# Patient Record
Sex: MALE | Race: OTHER | HISPANIC OR LATINO | URBAN - METROPOLITAN AREA
[De-identification: names, ages, dates, MRNs, and addresses within clinical notes are randomized per-mention and may not be internally consistent; named-entity substitution may affect disease eponyms.]

---

## 2017-01-10 ENCOUNTER — EMERGENCY (EMERGENCY)
Facility: HOSPITAL | Age: 32
LOS: 0 days | Discharge: HOME | End: 2017-01-10

## 2017-01-13 ENCOUNTER — RECORD ABSTRACTING (OUTPATIENT)
Age: 32
End: 2017-01-13

## 2017-05-18 ENCOUNTER — RECORD ABSTRACTING (OUTPATIENT)
Age: 32
End: 2017-05-18

## 2017-06-26 ENCOUNTER — APPOINTMENT (OUTPATIENT)
Dept: OTOLARYNGOLOGY | Facility: CLINIC | Age: 32
End: 2017-06-26

## 2017-06-27 DIAGNOSIS — K21.9 GASTRO-ESOPHAGEAL REFLUX DISEASE WITHOUT ESOPHAGITIS: ICD-10-CM

## 2017-06-27 DIAGNOSIS — J02.9 ACUTE PHARYNGITIS, UNSPECIFIED: ICD-10-CM

## 2017-06-27 DIAGNOSIS — R07.0 PAIN IN THROAT: ICD-10-CM

## 2017-06-29 ENCOUNTER — APPOINTMENT (OUTPATIENT)
Dept: OTOLARYNGOLOGY | Facility: CLINIC | Age: 32
End: 2017-06-29

## 2017-06-29 VITALS — BODY MASS INDEX: 21.69 KG/M2 | WEIGHT: 135 LBS | HEIGHT: 66 IN

## 2017-07-18 ENCOUNTER — APPOINTMENT (OUTPATIENT)
Dept: INTERNAL MEDICINE | Facility: CLINIC | Age: 32
End: 2017-07-18

## 2017-07-20 ENCOUNTER — APPOINTMENT (OUTPATIENT)
Dept: OTOLARYNGOLOGY | Facility: CLINIC | Age: 32
End: 2017-07-20

## 2017-07-20 VITALS — HEIGHT: 66 IN | WEIGHT: 135 LBS | BODY MASS INDEX: 21.69 KG/M2

## 2017-07-29 ENCOUNTER — OUTPATIENT (OUTPATIENT)
Dept: OUTPATIENT SERVICES | Facility: HOSPITAL | Age: 32
LOS: 1 days | Discharge: HOME | End: 2017-07-29

## 2017-07-29 DIAGNOSIS — R22.1 LOCALIZED SWELLING, MASS AND LUMP, NECK: ICD-10-CM

## 2017-08-14 ENCOUNTER — APPOINTMENT (OUTPATIENT)
Dept: OTOLARYNGOLOGY | Facility: CLINIC | Age: 32
End: 2017-08-14
Payer: COMMERCIAL

## 2017-08-14 VITALS — BODY MASS INDEX: 21.69 KG/M2 | HEIGHT: 66 IN | WEIGHT: 135 LBS

## 2017-08-14 PROCEDURE — 99213 OFFICE O/P EST LOW 20 MIN: CPT | Mod: 25

## 2017-08-14 PROCEDURE — 31575 DIAGNOSTIC LARYNGOSCOPY: CPT

## 2018-01-06 ENCOUNTER — INPATIENT (INPATIENT)
Facility: HOSPITAL | Age: 33
LOS: 0 days | Discharge: HOME | End: 2018-01-07
Attending: EMERGENCY MEDICINE

## 2018-01-10 DIAGNOSIS — R07.9 CHEST PAIN, UNSPECIFIED: ICD-10-CM

## 2018-01-10 DIAGNOSIS — M54.2 CERVICALGIA: ICD-10-CM

## 2018-01-10 DIAGNOSIS — R07.89 OTHER CHEST PAIN: ICD-10-CM

## 2018-01-22 ENCOUNTER — APPOINTMENT (OUTPATIENT)
Dept: OTOLARYNGOLOGY | Facility: CLINIC | Age: 33
End: 2018-01-22

## 2019-03-02 ENCOUNTER — EMERGENCY (EMERGENCY)
Facility: HOSPITAL | Age: 34
LOS: 0 days | Discharge: HOME | End: 2019-03-03
Attending: EMERGENCY MEDICINE | Admitting: EMERGENCY MEDICINE

## 2019-03-02 VITALS
DIASTOLIC BLOOD PRESSURE: 70 MMHG | HEART RATE: 75 BPM | RESPIRATION RATE: 18 BRPM | TEMPERATURE: 98 F | OXYGEN SATURATION: 98 % | SYSTOLIC BLOOD PRESSURE: 126 MMHG

## 2019-03-02 DIAGNOSIS — R19.02 LEFT UPPER QUADRANT ABDOMINAL SWELLING, MASS AND LUMP: ICD-10-CM

## 2019-03-02 DIAGNOSIS — R10.9 UNSPECIFIED ABDOMINAL PAIN: ICD-10-CM

## 2019-03-02 DIAGNOSIS — R10.12 LEFT UPPER QUADRANT PAIN: ICD-10-CM

## 2019-03-02 LAB
ALBUMIN SERPL ELPH-MCNC: 4.3 G/DL — SIGNIFICANT CHANGE UP (ref 3.5–5.2)
ALP SERPL-CCNC: 64 U/L — SIGNIFICANT CHANGE UP (ref 30–115)
ALT FLD-CCNC: 43 U/L — HIGH (ref 0–41)
ANION GAP SERPL CALC-SCNC: 9 MMOL/L — SIGNIFICANT CHANGE UP (ref 7–14)
AST SERPL-CCNC: 47 U/L — HIGH (ref 0–41)
BASOPHILS # BLD AUTO: 0.04 K/UL — SIGNIFICANT CHANGE UP (ref 0–0.2)
BASOPHILS NFR BLD AUTO: 0.5 % — SIGNIFICANT CHANGE UP (ref 0–1)
BILIRUB SERPL-MCNC: 0.6 MG/DL — SIGNIFICANT CHANGE UP (ref 0.2–1.2)
BUN SERPL-MCNC: 16 MG/DL — SIGNIFICANT CHANGE UP (ref 10–20)
CALCIUM SERPL-MCNC: 9 MG/DL — SIGNIFICANT CHANGE UP (ref 8.5–10.1)
CHLORIDE SERPL-SCNC: 105 MMOL/L — SIGNIFICANT CHANGE UP (ref 98–110)
CO2 SERPL-SCNC: 25 MMOL/L — SIGNIFICANT CHANGE UP (ref 17–32)
CREAT SERPL-MCNC: 0.8 MG/DL — SIGNIFICANT CHANGE UP (ref 0.7–1.5)
EOSINOPHIL # BLD AUTO: 0.08 K/UL — SIGNIFICANT CHANGE UP (ref 0–0.7)
EOSINOPHIL NFR BLD AUTO: 1 % — SIGNIFICANT CHANGE UP (ref 0–8)
GLUCOSE SERPL-MCNC: 90 MG/DL — SIGNIFICANT CHANGE UP (ref 70–99)
HCT VFR BLD CALC: 42.9 % — SIGNIFICANT CHANGE UP (ref 42–52)
HGB BLD-MCNC: 14.6 G/DL — SIGNIFICANT CHANGE UP (ref 14–18)
IMM GRANULOCYTES NFR BLD AUTO: 0.4 % — HIGH (ref 0.1–0.3)
LACTATE SERPL-SCNC: 0.8 MMOL/L — SIGNIFICANT CHANGE UP (ref 0.5–2.2)
LIDOCAIN IGE QN: 26 U/L — SIGNIFICANT CHANGE UP (ref 7–60)
LYMPHOCYTES # BLD AUTO: 1.9 K/UL — SIGNIFICANT CHANGE UP (ref 1.2–3.4)
LYMPHOCYTES # BLD AUTO: 23.1 % — SIGNIFICANT CHANGE UP (ref 20.5–51.1)
MCHC RBC-ENTMCNC: 28.6 PG — SIGNIFICANT CHANGE UP (ref 27–31)
MCHC RBC-ENTMCNC: 34 G/DL — SIGNIFICANT CHANGE UP (ref 32–37)
MCV RBC AUTO: 84.1 FL — SIGNIFICANT CHANGE UP (ref 80–94)
MONOCYTES # BLD AUTO: 0.48 K/UL — SIGNIFICANT CHANGE UP (ref 0.1–0.6)
MONOCYTES NFR BLD AUTO: 5.8 % — SIGNIFICANT CHANGE UP (ref 1.7–9.3)
NEUTROPHILS # BLD AUTO: 5.69 K/UL — SIGNIFICANT CHANGE UP (ref 1.4–6.5)
NEUTROPHILS NFR BLD AUTO: 69.2 % — SIGNIFICANT CHANGE UP (ref 42.2–75.2)
NRBC # BLD: 0 /100 WBCS — SIGNIFICANT CHANGE UP (ref 0–0)
PLATELET # BLD AUTO: 222 K/UL — SIGNIFICANT CHANGE UP (ref 130–400)
POTASSIUM SERPL-MCNC: 4.2 MMOL/L — SIGNIFICANT CHANGE UP (ref 3.5–5)
POTASSIUM SERPL-SCNC: 4.2 MMOL/L — SIGNIFICANT CHANGE UP (ref 3.5–5)
PROT SERPL-MCNC: 6.8 G/DL — SIGNIFICANT CHANGE UP (ref 6–8)
RBC # BLD: 5.1 M/UL — SIGNIFICANT CHANGE UP (ref 4.7–6.1)
RBC # FLD: 13.2 % — SIGNIFICANT CHANGE UP (ref 11.5–14.5)
SODIUM SERPL-SCNC: 139 MMOL/L — SIGNIFICANT CHANGE UP (ref 135–146)
WBC # BLD: 8.22 K/UL — SIGNIFICANT CHANGE UP (ref 4.8–10.8)
WBC # FLD AUTO: 8.22 K/UL — SIGNIFICANT CHANGE UP (ref 4.8–10.8)

## 2019-03-03 VITALS
HEART RATE: 69 BPM | SYSTOLIC BLOOD PRESSURE: 111 MMHG | RESPIRATION RATE: 18 BRPM | OXYGEN SATURATION: 97 % | DIASTOLIC BLOOD PRESSURE: 61 MMHG

## 2019-03-03 NOTE — ED PROVIDER NOTE - NSFOLLOWUPCLINICS_GEN_ALL_ED_FT
[facility]  Medicine  [address], [place]   Phone: [phone]  Fax:   Follow Up Time: 1-3 Days

## 2019-03-03 NOTE — ED PROVIDER NOTE - NS ED ROS FT
Review of Systems:  	•	CONSTITUTIONAL - no fever, no diaphoresis, no chills  	•	SKIN - no rash  	•	HEMATOLOGIC - no bleeding, no bruising  	•	EYES - no eye pain, no blurry vision  	•	ENT - no congestion  	•	RESPIRATORY - no shortness of breath, no cough  	•	CARDIAC - no chest pain, no palpitations  	•	GI - +abd pain, no nausea, no vomiting, no diarrhea, no constipation  	•	GENITO-URINARY - no dysuria; no hematuria, no increased urinary frequency  	•	MUSCULOSKELETAL - no joint paint, no swelling, no redness  	•	NEUROLOGIC - no weakness, no headache, no paresthesias, no LOC  	•	PSYCH - no anxiety, no depression  	All other ROS are negative except as documented in HPI.

## 2019-03-03 NOTE — ED PROVIDER NOTE - CLINICAL SUMMARY MEDICAL DECISION MAKING FREE TEXT BOX
patient remained stable in ED, labs/CT results noted, patient improved well, discussed with patient in detail about his clinical condition, and the need for close outpatient follow up, patient verbalized understanding and agreed.

## 2019-03-03 NOTE — ED ADULT NURSE NOTE - NSIMPLEMENTINTERV_GEN_ALL_ED
Implemented All Universal Safety Interventions:  [place] to call system. Call bell, personal items and telephone within reach. Instruct patient to call for assistance. Room bathroom lighting operational. Non-slip footwear when patient is off stretcher. Physically safe environment: no spills, clutter or unnecessary equipment. Stretcher in lowest position, wheels locked, appropriate side rails in place.

## 2019-03-03 NOTE — ED PROVIDER NOTE - PHYSICAL EXAMINATION
VITAL SIGNS: I have reviewed nursing notes and confirm.  CONSTITUTIONAL: Well-developed; well-nourished; in no acute distress.  SKIN: Skin exam is warm and dry, no acute rash.  HEAD: Normocephalic; atraumatic.  EYES: PERRL, EOM intact; conjunctiva and sclera clear.  ENT: No nasal discharge; airway clear.   NECK: Supple; non tender.  CARD: S1, S2 normal; no murmurs, gallops, or rubs. Regular rate and rhythm.  RESP: Clear to auscultation bilaterally. No wheezes, rales or rhonchi.  ABD: Normal bowel sounds; soft; non-distended; +LUQ tenderness. No rebound tenderness or guarding.   EXT: Normal ROM. No edema.  LYMPH: No acute cervical adenopathy.  NEURO: Alert, oriented. Grossly unremarkable. No focal deficits.  PSYCH: Cooperative, appropriate.

## 2019-03-03 NOTE — ED PROVIDER NOTE - ATTENDING CONTRIBUTION TO CARE
patient is c/o abdominal pain, LUQ area, intermittent episodes, worse after eating certain food, denies cp/sob/syncope/trauma. pain is described as gas/crampy, denies particular relieving factors, no rash, no  symptoms.   vitals noted  lungs: CTA, no wheezing  abd: +BS, +LUQ tenderness, ND, soft  A/P: Abdominal pain  labs, CT, symptomatic treatment  reevaluation

## 2019-03-03 NOTE — ED PROVIDER NOTE - OBJECTIVE STATEMENT
34 yo M with no pmhx presenting with constant, achy left upper quadrant pain and swelling x 3 days. States pain initially was intermittent 2 weeks ago but has now become constant. Symptoms are moderate. No alleviating or aggravating factors. No cp, sob, fever, chills, nausea, vomiting, diarrhea, back pain, urinary symptoms, headache, dizziness, paresthesias, or weakness.

## 2019-04-19 ENCOUNTER — APPOINTMENT (OUTPATIENT)
Dept: GASTROENTEROLOGY | Facility: CLINIC | Age: 34
End: 2019-04-19

## 2019-04-19 ENCOUNTER — OUTPATIENT (OUTPATIENT)
Dept: OUTPATIENT SERVICES | Facility: HOSPITAL | Age: 34
LOS: 1 days | Discharge: HOME | End: 2019-04-19

## 2019-04-19 VITALS — HEART RATE: 67 BPM | DIASTOLIC BLOOD PRESSURE: 79 MMHG | HEIGHT: 66 IN | SYSTOLIC BLOOD PRESSURE: 114 MMHG

## 2019-04-19 VITALS — WEIGHT: 158 LBS | HEIGHT: 66 IN | BODY MASS INDEX: 25.39 KG/M2

## 2019-04-19 NOTE — PHYSICAL EXAM
[General Appearance - Well Nourished] : well nourished [General Appearance - In No Acute Distress] : in no acute distress [General Appearance - Alert] : alert [Sclera] : the sclera and conjunctiva were normal [Neck Appearance] : the appearance of the neck was normal [Outer Ear] : the ears and nose were normal in appearance [Extraocular Movements] : extraocular movements were intact [] : no respiratory distress [Exaggerated Use Of Accessory Muscles For Inspiration] : no accessory muscle use [Neck Cervical Mass (___cm)] : no neck mass was observed [Bowel Sounds] : normal bowel sounds [Abdomen Tenderness] : non-tender [Abdomen Soft] : soft [Abnormal Walk] : normal gait

## 2019-04-19 NOTE — END OF VISIT
[FreeTextEntry3] : Pt seen and examined with Dr Von Licea.  Pt has mild lft abnormalities, will send CLD work up.  Pt has LUQ pain responsive to vinegar and he has pyrosis.  We will do egd to rule out acid peptic disease.

## 2019-04-19 NOTE — REASON FOR VISIT
[Post Hospitalization] : a post hospitalization visit [FreeTextEntry1] : Abdominal pain that resolved

## 2019-04-19 NOTE — ASSESSMENT
[FreeTextEntry1] : A 33 y old man previously heathy presented for abdominal pain after hospitalization. Patient was recently hospitalized in march for LUQ pain, intermittent then became constant, crampy like and then resolved. He also complaining of new left sided skin bulging. Also pt reports reflux been going for a while after easting red sauce \par \par No Diarrhea, no vomiting, no blood in the stools, no dysphagia, no other sx\par \par # Reflux/ Heartburn, no alarm sx/ LUQ distention \par CT scan negative except for hepatic steatosis  \par Antireflux diet \par Will schedule EGD\par ASA 1 \par No AC \par \par # Hepatic steatosis/ Mild hepatocellular transaminitis most likely NAFLD \par CLD workup \par Diet and exercise \par Avoid alcohol \par

## 2019-04-19 NOTE — HISTORY OF PRESENT ILLNESS
[de-identified] : A 33 y old man previously heathy presented for abdominal pain after hospitalization. Patient was recently hospitalized in march for LUQ pain, intermittent then became constant, crampy like and then resolved. He also complaining of new left sided skin bulging. Also pt reports reflux been going for a while after easting red sauce \par \par No Diarrhea, no vomiting, no blood in the stools, no dysphagia, no other sx [FreeTextEntry1] : \par

## 2019-04-29 DIAGNOSIS — R10.12 LEFT UPPER QUADRANT PAIN: ICD-10-CM

## 2019-05-03 LAB
ANA SER IF-ACNC: NEGATIVE
CERULOPLASMIN SERPL-MCNC: 19 MG/DL
FERRITIN SERPL-MCNC: 57 NG/ML
HAV IGM SER QL: NONREACTIVE
HBV SURFACE AB SER QL: NONREACTIVE
HBV SURFACE AG SER QL: NONREACTIVE
HCV AB SER QL: NONREACTIVE
HCV S/CO RATIO: 0.06 S/CO
HEPATITIS A IGG ANTIBODY: REACTIVE
IRON SATN MFR SERPL: 41 %
IRON SERPL-MCNC: 130 UG/DL
MITOCHONDRIA AB SER IF-ACNC: NORMAL
SMOOTH MUSCLE AB SER QL IF: NORMAL
TIBC SERPL-MCNC: 317 UG/DL
UIBC SERPL-MCNC: 187 UG/DL

## 2019-05-13 ENCOUNTER — OUTPATIENT (OUTPATIENT)
Dept: OUTPATIENT SERVICES | Facility: HOSPITAL | Age: 34
LOS: 1 days | Discharge: HOME | End: 2019-05-13
Payer: SUBSIDIZED

## 2019-05-13 ENCOUNTER — RESULT REVIEW (OUTPATIENT)
Age: 34
End: 2019-05-13

## 2019-05-13 VITALS
DIASTOLIC BLOOD PRESSURE: 72 MMHG | HEART RATE: 74 BPM | RESPIRATION RATE: 18 BRPM | WEIGHT: 158.07 LBS | SYSTOLIC BLOOD PRESSURE: 113 MMHG | HEIGHT: 66 IN | TEMPERATURE: 98 F

## 2019-05-13 VITALS
SYSTOLIC BLOOD PRESSURE: 112 MMHG | DIASTOLIC BLOOD PRESSURE: 69 MMHG | HEART RATE: 70 BPM | OXYGEN SATURATION: 98 % | RESPIRATION RATE: 18 BRPM

## 2019-05-13 PROCEDURE — 88305 TISSUE EXAM BY PATHOLOGIST: CPT | Mod: 26

## 2019-05-13 PROCEDURE — 88312 SPECIAL STAINS GROUP 1: CPT | Mod: 26

## 2019-05-13 NOTE — H&P PST ADULT - ASSESSMENT
34 yo M with PMH mentioned here for EGD for GERD and Dyspepsia .   Risks and benefits discussed with the patient.   Will proceed with the scheduled case.

## 2019-05-13 NOTE — ASU DISCHARGE PLAN (ADULT/PEDIATRIC) - CALL YOUR DOCTOR IF YOU HAVE ANY OF THE FOLLOWING:
Fever greater than (need to indicate Fahrenheit or Celsius)/Nausea and vomiting that does not stop/Bleeding that does not stop/Pain not relieved by Medications/Inability to tolerate liquids or foods

## 2019-05-15 LAB — SURGICAL PATHOLOGY STUDY: SIGNIFICANT CHANGE UP

## 2019-05-21 DIAGNOSIS — K29.50 UNSPECIFIED CHRONIC GASTRITIS WITHOUT BLEEDING: ICD-10-CM

## 2019-05-21 DIAGNOSIS — B96.81 HELICOBACTER PYLORI [H. PYLORI] AS THE CAUSE OF DISEASES CLASSIFIED ELSEWHERE: ICD-10-CM

## 2019-05-21 DIAGNOSIS — R10.13 EPIGASTRIC PAIN: ICD-10-CM

## 2019-06-04 ENCOUNTER — OUTPATIENT (OUTPATIENT)
Dept: OUTPATIENT SERVICES | Facility: HOSPITAL | Age: 34
LOS: 1 days | Discharge: HOME | End: 2019-06-04

## 2019-06-04 ENCOUNTER — APPOINTMENT (OUTPATIENT)
Dept: INTERNAL MEDICINE | Facility: CLINIC | Age: 34
End: 2019-06-04

## 2019-06-04 VITALS
TEMPERATURE: 96.56 F | HEIGHT: 66 IN | HEART RATE: 77 BPM | DIASTOLIC BLOOD PRESSURE: 81 MMHG | BODY MASS INDEX: 25.88 KG/M2 | SYSTOLIC BLOOD PRESSURE: 122 MMHG | WEIGHT: 161 LBS

## 2019-06-04 DIAGNOSIS — L80 VITILIGO: ICD-10-CM

## 2019-06-04 DIAGNOSIS — B37.89 OTHER SITES OF CANDIDIASIS: ICD-10-CM

## 2019-06-04 DIAGNOSIS — Z87.898 PERSONAL HISTORY OF OTHER SPECIFIED CONDITIONS: ICD-10-CM

## 2019-06-04 DIAGNOSIS — K76.0 FATTY (CHANGE OF) LIVER, NOT ELSEWHERE CLASSIFIED: ICD-10-CM

## 2019-06-04 DIAGNOSIS — J37.0 CHRONIC LARYNGITIS: ICD-10-CM

## 2019-06-04 DIAGNOSIS — A04.8 OTHER SPECIFIED BACTERIAL INTESTINAL INFECTIONS: ICD-10-CM

## 2019-06-04 DIAGNOSIS — D22.9 MELANOCYTIC NEVI, UNSPECIFIED: ICD-10-CM

## 2019-06-04 RX ORDER — AMOXICILLIN AND CLAVULANATE POTASSIUM 875; 125 MG/1; MG/1
875-125 TABLET, COATED ORAL
Qty: 20 | Refills: 0 | Status: DISCONTINUED | COMMUNITY
Start: 2017-06-29 | End: 2019-06-04

## 2019-06-04 RX ORDER — OMEPRAZOLE 40 MG/1
40 CAPSULE, DELAYED RELEASE ORAL
Refills: 0 | Status: DISCONTINUED | COMMUNITY
End: 2019-06-04

## 2019-06-04 RX ORDER — OMEPRAZOLE 40 MG/1
40 CAPSULE, DELAYED RELEASE ORAL
Qty: 30 | Refills: 3 | Status: DISCONTINUED | COMMUNITY
Start: 2017-08-14 | End: 2019-06-04

## 2019-06-04 RX ORDER — LORATADINE 10 MG
10 TABLET,DISINTEGRATING ORAL DAILY
Qty: 30 | Refills: 3 | Status: DISCONTINUED | COMMUNITY
Start: 2017-06-29 | End: 2019-06-04

## 2019-06-04 NOTE — REVIEW OF SYSTEMS
[Redness] : redness [Sore Throat] : sore throat [Abdominal Pain] : abdominal pain [Negative] : Constitutional [Pain] : no pain [Chest Pain] : no chest pain [Hearing Loss] : no hearing loss [Shortness Of Breath] : no shortness of breath [Wheezing] : no wheezing [Cough] : no cough [Nausea] : no nausea [Vomiting] : no vomiting [Constipation] : no constipation [Melena] : no melena [Dysuria] : no dysuria [Headache] : no headache [Back Pain] : no back pain [Suicidal] : not suicidal [Insomnia] : no insomnia [Anxiety] : no anxiety [Depression] : no depression

## 2019-06-04 NOTE — PHYSICAL EXAM
[EOMI] : extraocular movements intact [No Acute Distress] : no acute distress [Normal Outer Ear/Nose] : the outer ears and nose were normal in appearance [Normal Oropharynx] : the oropharynx was normal [No JVD] : no jugular venous distention [Supple] : supple [Clear to Auscultation] : lungs were clear to auscultation bilaterally [Regular Rhythm] : with a regular rhythm [Normal Rate] : normal rate  [No Accessory Muscle Use] : no accessory muscle use [No Edema] : there was no peripheral edema [Normal S1, S2] : normal S1 and S2 [Non Tender] : non-tender [No HSM] : no HSM [Soft] : abdomen soft [No CVA Tenderness] : no CVA  tenderness [No Spinal Tenderness] : no spinal tenderness [Normal Gait] : normal gait [de-identified] : left hand hypopigmented irregular macular lesions, acanthosis nigricans on the right hip, multilpe nevi on the trunk and back [de-identified] : red eyes

## 2019-06-04 NOTE — HISTORY OF PRESENT ILLNESS
[de-identified] : a 34 yo man with GERD and hx of chronic laryngitis off treatment, is here to establish care\par \par he complains of sore throat and foreign body sensation in his throat. he followed with ENT, s/p scope which showed inflammation. referred to GI, s/p EGD 5/13/19: patchy granularity + erythema of the mucosa was noted in the stomach. H pylori positive\par \par he also complains of eruption of multiple nevi on his trunk and back that all appeared over the last 2 years, he also has left hand depigmented macular lesions, sometimes itching.

## 2019-06-04 NOTE — ASSESSMENT
[FreeTextEntry1] : a 34 yo man with GERD and hx of chronic laryngitis off treatment, is here to establish care\par \par *H.pylori gastritis\par -triple therapy\par -stool ag after 2 weeks off PPI\par -GI follow up\par \par *hepatic steatosis\par CLD workup negative\par LFTs mildly elevated\par repeat LFTs\par check hba1c and lipid profile\par dietary referral\par diet and exercise\par \par *Vitiligo and multiple Nevi eruption\par derm referral\par \par *HCM\par refused dtap\par hep B vaccine given 1st dose 6/4/2019\par \par

## 2019-06-06 LAB
25(OH)D3 SERPL-MCNC: 15 NG/ML
ALBUMIN SERPL ELPH-MCNC: 4.8 G/DL
ALP BLD-CCNC: 75 U/L
ALT SERPL-CCNC: 37 U/L
ANION GAP SERPL CALC-SCNC: 11 MMOL/L
AST SERPL-CCNC: 23 U/L
BILIRUB DIRECT SERPL-MCNC: <0.2 MG/DL
BILIRUB INDIRECT SERPL-MCNC: >0.5 MG/DL
BILIRUB SERPL-MCNC: 0.7 MG/DL
BUN SERPL-MCNC: 14 MG/DL
CALCIUM SERPL-MCNC: 9.3 MG/DL
CHLORIDE SERPL-SCNC: 102 MMOL/L
CHOLEST SERPL-MCNC: 216 MG/DL
CHOLEST/HDLC SERPL: 4.6 RATIO
CO2 SERPL-SCNC: 29 MMOL/L
CREAT SERPL-MCNC: 0.9 MG/DL
ESTIMATED AVERAGE GLUCOSE: 103 MG/DL
GLUCOSE SERPL-MCNC: 96 MG/DL
HBA1C MFR BLD HPLC: 5.2 %
HDLC SERPL-MCNC: 47 MG/DL
LDLC SERPL CALC-MCNC: 172 MG/DL
POTASSIUM SERPL-SCNC: 5 MMOL/L
PROT SERPL-MCNC: 7.4 G/DL
SODIUM SERPL-SCNC: 142 MMOL/L
TRIGL SERPL-MCNC: 108 MG/DL

## 2019-06-07 LAB — TSH SERPL-ACNC: 1.65 UIU/ML

## 2019-06-10 LAB — H PYLORI AG STL QL: DETECTED

## 2019-07-09 ENCOUNTER — APPOINTMENT (OUTPATIENT)
Dept: INTERNAL MEDICINE | Facility: CLINIC | Age: 34
End: 2019-07-09

## 2019-07-09 ENCOUNTER — OUTPATIENT (OUTPATIENT)
Dept: OUTPATIENT SERVICES | Facility: HOSPITAL | Age: 34
LOS: 1 days | Discharge: HOME | End: 2019-07-09

## 2019-07-09 VITALS
TEMPERATURE: 97.2 F | WEIGHT: 153 LBS | DIASTOLIC BLOOD PRESSURE: 78 MMHG | HEIGHT: 66 IN | HEART RATE: 65 BPM | SYSTOLIC BLOOD PRESSURE: 118 MMHG | BODY MASS INDEX: 24.59 KG/M2

## 2019-07-09 DIAGNOSIS — Z23 ENCOUNTER FOR IMMUNIZATION: ICD-10-CM

## 2019-07-09 NOTE — ASSESSMENT
[FreeTextEntry1] : a 33 yo man with GERD and hx of chronic laryngitis off treatment, is here to establish care\par \par *H.pylori gastritis\par -triple therapy finished 2 weeks ago\par -stool ag to be done\par -GI follow up next week\par -c/w PPI post stool test\par \par *hepatic steatosis\par CLD workup negative\par LFTs normal June 2019\par LDL high but ASCVD score 10 year risk is only 5 --> diet and exercise, dietician follow up scheduled in 2 weeks\par diet and exercise\par \par *Vitiligo and multiple Nevi eruption\par derm referral scheduled in oct\par \par *Vitamin D def\par -start replacement\par \par *HCM\par refused dtap\par hep B vaccine given 1st dose 6/4/2019, second dose 7/9/2019.\par

## 2019-07-09 NOTE — HISTORY OF PRESENT ILLNESS
[FreeTextEntry1] : follow up and hep B vaccine [de-identified] : a 33 yo man with GERD and hx of chronic laryngitis off treatment,  H.pylori and gastritis is here for follow up\par he finished triple therapy 2 weeks ago (amoxicillin, clarithromycin and PPI), now off PPI\par he is still complaining of epigastric pain

## 2019-07-09 NOTE — REVIEW OF SYSTEMS
[Abdominal Pain] : abdominal pain [Heartburn] : heartburn [Negative] : Respiratory [Nausea] : no nausea [Constipation] : no constipation [Vomiting] : no vomiting [Dysuria] : no dysuria [Joint Pain] : no joint pain [de-identified] : vitiligo

## 2019-07-12 LAB — H PYLORI AG STL QL: NOT DETECTED

## 2019-07-19 ENCOUNTER — OUTPATIENT (OUTPATIENT)
Dept: OUTPATIENT SERVICES | Facility: HOSPITAL | Age: 34
LOS: 1 days | Discharge: HOME | End: 2019-07-19

## 2019-07-19 ENCOUNTER — APPOINTMENT (OUTPATIENT)
Dept: GASTROENTEROLOGY | Facility: CLINIC | Age: 34
End: 2019-07-19
Payer: COMMERCIAL

## 2019-07-19 PROCEDURE — 99214 OFFICE O/P EST MOD 30 MIN: CPT

## 2019-07-19 NOTE — ASSESSMENT
[FreeTextEntry1] : 35 yo M with H pylori gastritis SP eradication\par 1)H pylori- treated\par 2)Average risk CRC\par \par rec:\par Continue protonix for GERD\par Follow up with General medicine\par Return at 49 year of age for CRC screening colonoscopy

## 2019-07-19 NOTE — HISTORY OF PRESENT ILLNESS
[de-identified] : A 33 y old man previously heathy presented for abdominal pain after hospitalization. Patient was recently hospitalized in march for LUQ pain, intermittent then became constant, crampy like and then resolved. He also complaining of new left sided skin bulging. Also pt reports reflux been going for a while after easting red sauce \par \par No Diarrhea, no vomiting, no blood in the stools, no dysphagia, no other sx. \par \par \par Interval Hx:\par H pylori treated and stool Ag was negative afterwards.\par Still cpmplaining of a painful buldge on the LUQ.\par No other symptoms

## 2019-07-19 NOTE — PHYSICAL EXAM
[General Appearance - Alert] : alert [General Appearance - In No Acute Distress] : in no acute distress [Sclera] : the sclera and conjunctiva were normal [PERRL With Normal Accommodation] : pupils were equal in size, round, and reactive to light [Extraocular Movements] : extraocular movements were intact [Outer Ear] : the ears and nose were normal in appearance [Oropharynx] : the oropharynx was normal [Neck Appearance] : the appearance of the neck was normal [] : no respiratory distress [Abdominal Aorta] : the abdominal aorta was normal [No CVA Tenderness] : no ~M costovertebral angle tenderness [Abnormal Walk] : normal gait

## 2019-07-23 DIAGNOSIS — K76.0 FATTY (CHANGE OF) LIVER, NOT ELSEWHERE CLASSIFIED: ICD-10-CM

## 2019-07-23 DIAGNOSIS — A04.8 OTHER SPECIFIED BACTERIAL INTESTINAL INFECTIONS: ICD-10-CM

## 2019-07-23 DIAGNOSIS — K21.9 GASTRO-ESOPHAGEAL REFLUX DISEASE WITHOUT ESOPHAGITIS: ICD-10-CM

## 2019-08-05 ENCOUNTER — OTHER (OUTPATIENT)
Age: 34
End: 2019-08-05

## 2019-08-05 ENCOUNTER — APPOINTMENT (OUTPATIENT)
Dept: NUTRITION | Facility: CLINIC | Age: 34
End: 2019-08-05

## 2019-10-07 ENCOUNTER — EMERGENCY (EMERGENCY)
Facility: HOSPITAL | Age: 34
LOS: 0 days | Discharge: HOME | End: 2019-10-07
Attending: EMERGENCY MEDICINE | Admitting: EMERGENCY MEDICINE
Payer: SUBSIDIZED

## 2019-10-07 VITALS
RESPIRATION RATE: 18 BRPM | SYSTOLIC BLOOD PRESSURE: 112 MMHG | HEART RATE: 71 BPM | OXYGEN SATURATION: 100 % | DIASTOLIC BLOOD PRESSURE: 71 MMHG

## 2019-10-07 VITALS
RESPIRATION RATE: 18 BRPM | HEART RATE: 60 BPM | DIASTOLIC BLOOD PRESSURE: 68 MMHG | TEMPERATURE: 98 F | SYSTOLIC BLOOD PRESSURE: 109 MMHG | OXYGEN SATURATION: 100 %

## 2019-10-07 DIAGNOSIS — R10.9 UNSPECIFIED ABDOMINAL PAIN: ICD-10-CM

## 2019-10-07 DIAGNOSIS — R22.2 LOCALIZED SWELLING, MASS AND LUMP, TRUNK: ICD-10-CM

## 2019-10-07 PROCEDURE — 99283 EMERGENCY DEPT VISIT LOW MDM: CPT

## 2019-10-07 NOTE — ED PROVIDER NOTE - NSFOLLOWUPINSTRUCTIONS_ED_ALL_ED_FT
PLEASE FOLLOW UP WITH [facility] ON     Abdominal Pain    Many things can cause abdominal pain. Many times, abdominal pain is not caused by a disease and will improve without treatment. Your health care provider will do a physical exam to determine if there is a dangerous cause of your pain; blood tests and imaging may help determine the cause of your pain. However, in many cases, no cause may be found and you may need further testing as an outpatient. Monitor your abdominal pain for any changes.     SEEK IMMEDIATE MEDICAL CARE IF YOU HAVE ANY OF THE FOLLOWING SYMPTOMS: worsening abdominal pain, uncontrollable vomiting, profuse diarrhea, inability to have bowel movements or pass gas, black or bloody stools, fever accompanying chest pain or back pain, or fainting. These symptoms may represent a serious problem that is an emergency. Do not wait to see if the symptoms will go away. Get medical help right away. Call 911 and do not drive yourself to the hospital.

## 2019-10-07 NOTE — ED PROVIDER NOTE - PATIENT PORTAL LINK FT
You can access the FollowMyHealth Patient Portal offered by [facility] by registering at the following website: [email or web address]. By joining [facility]’s FollowMyHealth portal, you will also be able to view your health information using other applications (apps) compatible with our system.

## 2019-10-07 NOTE — ED PROVIDER NOTE - PROGRESS NOTE DETAILS
Discussed with GI Fellow. Recommends outpatient follow up. Reached GI clinic. Attempted to schedule earlier appt for pt. Clinic will call back once earlier availability is present.

## 2019-10-07 NOTE — ED PROVIDER NOTE - PHYSICAL EXAMINATION
CONSTITUTIONAL: Well-developed; well-nourished; in no acute distress.   SKIN: warm, dry  HEAD: Normocephalic; atraumatic.  EYES: PERRL, EOMI, no conjunctival erythema  ENT: No nasal discharge; airway clear.  NECK: Supple; non tender.  CARD: S1, S2 normal; no murmurs, gallops, or rubs. Regular rate and rhythm.   RESP: No wheezes, rales or rhonchi.  ABD: soft ntnd. mild bulge visualized when standing.  EXT: Normal ROM.  No clubbing, cyanosis or edema.   LYMPH: No acute cervical adenopathy.  NEURO: Alert, oriented, grossly unremarkable  PSYCH: Cooperative, appropriate.

## 2019-10-07 NOTE — ED PROVIDER NOTE - ATTENDING CONTRIBUTION TO CARE
35 y/o male h/o PUD now presents with abdominal pain and abdominal wall swelling x approx 7 months. Pt states he began to have upper abdominal pain in march 2019, pain located in upper abdomen, denies radiation, denies modifying factors, also reports swelling of abdominal wall, betterlying down, worse upright, was seen at onset of sx at [facility], had normal labs, CT, followed up by GI, found to have H Pylorio and treated with triple therapy, pt states pain has not completely resolved and swelling persists, denies fever, n/v/d, anorexia, cough, respiratory sx, change in bowel habits or urinary sx, penile d/c or other associated complaints at present.     Old chart reviewed.  I have reviewed and agree with the nursing note, except as documented in my note.    VSS, awake, alert, non-toxic appearing, lying comfortably in stretcher, in NAD, ears clear, no scleral icterus, oropharynx clear, mmm, no JVD or bruit, no jaundice, skin rash or lesions, chest CTAB, non-labored breathing, no w/r/r, +S1/S2, RRR, no m/r/g, abdomen soft, NT, ND, +BS, no scars, hernias or distention, no pulsatile masses or bruits appreciated, no CVA tenderness, no peripheral edema or deformities, alert, clear speech and steady gait.    IMP: pt with normal exam, subjective findings of abdominal wall swelling, no evidence of pathology on prior CT, d/w pt, rec outpt f/u pt amenable to plan, GI appt scheduled for this week for pt. The patient was given detailed return precautions and advised to return to the emergency department if any new symptoms developed, symptoms worsened or for any concerns. The patient was offered the opportunity to ask questions and verbalized that they understand the diagnosis and discharge instructions. 33 y/o male h/o PUD now presents with abdominal pain and abdominal wall swelling x approx 7 months. Pt states he began to have upper abdominal pain in march 2019, pain located in upper abdomen, denies radiation, denies modifying factors, also reports swelling of abdominal wall, better lying down, worse upright, was seen at onset of sx at [facility], had normal labs, CT, followed up by GI, found to have H Pylori and treated with triple therapy, pt states pain has not completely resolved and swelling persists, denies fever, n/v/d, anorexia, cough, respiratory sx, change in bowel habits or urinary sx, penile d/c or other associated complaints at present.     Old chart reviewed.  I have reviewed and agree with the nursing note, except as documented in my note.    VSS, awake, alert, non-toxic appearing, lying comfortably in stretcher, in NAD, ears clear, no scleral icterus, oropharynx clear, mmm, no JVD or bruit, no jaundice, skin rash or lesions, chest CTAB, non-labored breathing, no w/r/r, +S1/S2, RRR, no m/r/g, abdomen soft, NT, ND, +BS, no scars, hernias or distention, no pulsatile masses or bruits appreciated, no CVA tenderness, no peripheral edema or deformities, alert, clear speech and steady gait.    IMP: pt with normal exam, subjective findings of abdominal wall swelling, no evidence of pathology on prior CT, d/w pt, rec outpt f/u pt amenable to plan, GI appt scheduled for this week for pt. The patient was given detailed return precautions and advised to return to the emergency department if any new symptoms developed, symptoms worsened or for any concerns. The patient was offered the opportunity to ask questions and verbalized that they understand the diagnosis and discharge instructions.

## 2019-10-07 NOTE — ED ADULT NURSE NOTE - NSIMPLEMENTINTERV_GEN_ALL_ED
Implemented All Universal Safety Interventions:  [place] to call system. Call bell, personal items and telephone within reach. Instruct patient to call for assistance. Room bathroom lighting operational. Non-slip footwear when patient is off stretcher. Physically safe environment: no spills, clutter or unnecessary equipment. Stretcher in lowest position, wheels locked, appropriate side rails in place.

## 2019-10-07 NOTE — ED PROVIDER NOTE - OBJECTIVE STATEMENT
34y M w/ no sig PMH presents with a swelling on his JOSE abd. States was recently treated for H. pylori infection 3 months ago with confirmation of infection resolution. Was given EGD and abx. States since the test noticed a bulge in his JOSE abd that is worse with standing and better when laying down. Does not have any other symptoms. Tolerating PO without n/v and passing flatus, BM. Denies fever, chills, abd pain, diarrhea, or numbness/tingling.

## 2019-10-07 NOTE — ED PROVIDER NOTE - CARE PROVIDER_API CALL
Joni Lora)  Gastroenterology; Internal Medicine  [address] [place] [address]  Phone: [phone]  Fax: [phone]  Follow Up Time: 7-10 Days

## 2019-10-11 ENCOUNTER — LABORATORY RESULT (OUTPATIENT)
Age: 34
End: 2019-10-11

## 2019-10-11 ENCOUNTER — OUTPATIENT (OUTPATIENT)
Dept: OUTPATIENT SERVICES | Facility: HOSPITAL | Age: 34
LOS: 1 days | Discharge: HOME | End: 2019-10-11

## 2019-10-11 ENCOUNTER — APPOINTMENT (OUTPATIENT)
Dept: GASTROENTEROLOGY | Facility: CLINIC | Age: 34
End: 2019-10-11
Payer: COMMERCIAL

## 2019-10-11 PROCEDURE — 99214 OFFICE O/P EST MOD 30 MIN: CPT

## 2019-10-11 RX ORDER — AMOXICILLIN 500 MG/1
500 CAPSULE ORAL TWICE DAILY
Qty: 56 | Refills: 0 | Status: COMPLETED | COMMUNITY
Start: 2019-06-04 | End: 2019-10-11

## 2019-10-11 RX ORDER — CLARITHROMYCIN 500 MG/1
500 TABLET, FILM COATED ORAL
Qty: 28 | Refills: 0 | Status: COMPLETED | COMMUNITY
Start: 2019-06-04 | End: 2019-10-11

## 2019-10-11 NOTE — HISTORY OF PRESENT ILLNESS
[de-identified] : \par  [FreeTextEntry1] : A 33 y old man previously heathy seen on 7/2019 for abd pain s/p egd showing gastritis + HP s/p eradication and documentation of cure. He comes again for recurrence of abd pain. The pain is epigastric, moderate, continuous,  not at night, since 7/2019, not radiating, not related to food or bms, worse when lying on his belly. \par \par \par egd \par no colonoscopy \par FH non significant \par has 1 bm per day, normal, no blood, no wt loss \par

## 2019-10-11 NOTE — ASSESSMENT
[FreeTextEntry1] : A 33 y old man previously heathy seen on 7/2019 for abd pain s/p egd showing gastritis + HP s/p eradication and documentation of cure. He comes again for recurrence of abd pain. The pain is epigastric, moderate, continuous,  not at night, since 7/2019, not radiating, not related to food or bms, worse when lying on his belly. \par \par 1- recurrent abd pain :Most likely musculoskeletal\par The patient points to the left side of his ribs as the location of his pain and it is worse with changing position and sleeping on that area\par egd 5/2019 dr chin gastritis + HP\par s/p triple therapy, HP stool ag negative afterwards \par off PPI \par \par heating pad q12h x 7 days \par if not better with heating pad, patient will try  omeprazole daily\par \par 2- CRc screening \par at 50yo unless indicated otherwise \par \par 3- Hx of hepatic steatosis \par high ldl \par Negative: yue, ama, asma, hbs ag, hbs ab, hcv, ceruloplasmin, iron studies negative\par check hbc ab, if negative needs hbv vaccine\par healthy diet, weight loss \par check RAY fibrosure \par

## 2019-10-11 NOTE — PHYSICAL EXAM
[General Appearance - Alert] : alert [General Appearance - In No Acute Distress] : in no acute distress [Sclera] : the sclera and conjunctiva were normal [PERRL With Normal Accommodation] : pupils were equal in size, round, and reactive to light [Extraocular Movements] : extraocular movements were intact [Outer Ear] : the ears and nose were normal in appearance [Neck Appearance] : the appearance of the neck was normal [] : no respiratory distress [Abdominal Aorta] : the abdominal aorta was normal [Bowel Sounds] : normal bowel sounds [Abdomen Soft] : soft [Abdomen Tenderness] : non-tender [Cervical Lymph Nodes Enlarged Posterior Bilaterally] : posterior cervical [No CVA Tenderness] : no ~M costovertebral angle tenderness [Abnormal Walk] : normal gait [Cranial Nerves] : cranial nerves 2-12 were intact [Oriented To Time, Place, And Person] : oriented to person, place, and time

## 2019-10-11 NOTE — REVIEW OF SYSTEMS
[As Noted in HPI] : as noted in HPI [Abdominal Pain] : abdominal pain [Fever] : no fever [Eye Pain] : no eye pain [Earache] : no earache [Chest Pain] : no chest pain [Shortness Of Breath] : no shortness of breath [Vomiting] : no vomiting [Constipation] : no constipation [Dysuria] : no dysuria [Arthralgias] : no arthralgias [Skin Lesions] : no skin lesions [Confused] : no confusion

## 2019-10-14 LAB — HBV CORE IGG+IGM SER QL: NONREACTIVE

## 2019-10-22 ENCOUNTER — LABORATORY RESULT (OUTPATIENT)
Age: 34
End: 2019-10-22

## 2019-10-22 ENCOUNTER — APPOINTMENT (OUTPATIENT)
Dept: DERMATOLOGY | Facility: CLINIC | Age: 34
End: 2019-10-22
Payer: COMMERCIAL

## 2019-10-22 ENCOUNTER — OUTPATIENT (OUTPATIENT)
Dept: OUTPATIENT SERVICES | Facility: HOSPITAL | Age: 34
LOS: 1 days | Discharge: HOME | End: 2019-10-22

## 2019-10-22 PROCEDURE — 99202 OFFICE O/P NEW SF 15 MIN: CPT

## 2019-10-22 NOTE — ASSESSMENT
[FreeTextEntry1] : Will  get few bloods to r/o atopy and secondary syphillis.\par May need biopsy if persistent.\par Topical treatment for acne

## 2019-10-22 NOTE — HISTORY OF PRESENT ILLNESS
[de-identified] : Rashes, itchy for over a year.\par Lesions come and go, unresponsive to triamcinolone 0.1 cream or clobetasol oint.\par Recently treated for H pylori.\par \par Lesions start small and enlarge.\par Not many now

## 2019-10-22 NOTE — PHYSICAL EXAM
[FreeTextEntry3] : Scattererd 2-4mm scaly slightly erythematous macules, dorsal hands and arms, few on trunk.\par Right abdomen, two larger similar lesions, about 1cm.\par \par Anterior neck area small follicular acneiform papules.

## 2019-10-29 LAB
RPR SER-TITR: NORMAL
TOTAL IGE SMQN RAST: 1181 KU/L

## 2019-11-19 ENCOUNTER — OUTPATIENT (OUTPATIENT)
Dept: OUTPATIENT SERVICES | Facility: HOSPITAL | Age: 34
LOS: 1 days | Discharge: HOME | End: 2019-11-19

## 2019-11-19 ENCOUNTER — APPOINTMENT (OUTPATIENT)
Dept: DERMATOLOGY | Facility: CLINIC | Age: 34
End: 2019-11-19
Payer: COMMERCIAL

## 2019-11-19 ENCOUNTER — LABORATORY RESULT (OUTPATIENT)
Age: 34
End: 2019-11-19

## 2019-11-19 PROCEDURE — 11102 TANGNTL BX SKIN SINGLE LES: CPT

## 2019-11-19 RX ORDER — PANTOPRAZOLE 40 MG/1
40 TABLET, DELAYED RELEASE ORAL TWICE DAILY
Qty: 1 | Refills: 1 | Status: DISCONTINUED | COMMUNITY
Start: 2019-06-04 | End: 2019-11-19

## 2019-11-19 RX ORDER — CLINDAMYCIN PHOSPHATE 1 G/10ML
1 GEL TOPICAL TWICE DAILY
Qty: 1 | Refills: 5 | Status: DISCONTINUED | COMMUNITY
Start: 2019-10-22 | End: 2019-11-19

## 2019-11-19 RX ORDER — CHROMIUM 200 MCG
1000 TABLET ORAL DAILY
Qty: 90 | Refills: 1 | Status: DISCONTINUED | COMMUNITY
Start: 2019-07-09 | End: 2019-11-19

## 2019-11-19 RX ORDER — PANTOPRAZOLE 40 MG/1
40 TABLET, DELAYED RELEASE ORAL DAILY
Qty: 1 | Refills: 0 | Status: DISCONTINUED | COMMUNITY
Start: 2019-07-09 | End: 2019-11-19

## 2019-11-19 RX ORDER — OMEPRAZOLE 40 MG/1
40 CAPSULE, DELAYED RELEASE ORAL DAILY
Qty: 30 | Refills: 2 | Status: DISCONTINUED | COMMUNITY
Start: 2019-10-11 | End: 2019-11-19

## 2019-11-19 NOTE — ASSESSMENT
[FreeTextEntry1] : Patient is 35 yo male with chronic itchy rash, not responsive to topical steroids, and elevated IgE.\par \par #Rash, likely allergic\par -Biopsy today--wound care instructions given--well-tolerated\par -prednisone trial\par -asked patient to bring in the pills or name of the pills that the other doctor gave him\par \par RTC 3 weeks biopsy results

## 2019-11-19 NOTE — PHYSICAL EXAM
[Alert] : alert [Oriented x 3] : ~L oriented x 3 [Well Nourished] : well nourished [FreeTextEntry3] : Diffuse circular patches of xerosis, all over back, mildly on neck, chest, arms, increased on the abdomen under the belt.

## 2019-11-19 NOTE — HISTORY OF PRESENT ILLNESS
[FreeTextEntry1] : Follow up [de-identified] : Patient is a 34 year old Male with vitiligo, chronic itchy rash presenting for f/u. Last seen in this clinic 10/22/2019.\par Reports that the rash has not changed. Wants to know lab results. Syphilis was negative. IgE positive at 1181. \par -In interim, 3 weeks ago, went to another doctor who gave him an injection for the rash--he is not sure what the medication was. ?antibiotic. Says that it did help, lesions became smaller and drier. \par -Also got pills, not sure what kind, that was prescribed TID \par -No new detergents or soaps\par -Wants a biopsy because the other doctor said needs a biopsy. \par

## 2019-11-26 ENCOUNTER — APPOINTMENT (OUTPATIENT)
Dept: INTERNAL MEDICINE | Facility: CLINIC | Age: 34
End: 2019-11-26
Payer: COMMERCIAL

## 2019-11-26 ENCOUNTER — OUTPATIENT (OUTPATIENT)
Dept: OUTPATIENT SERVICES | Facility: HOSPITAL | Age: 34
LOS: 1 days | Discharge: HOME | End: 2019-11-26

## 2019-11-26 VITALS
SYSTOLIC BLOOD PRESSURE: 128 MMHG | TEMPERATURE: 98.1 F | BODY MASS INDEX: 25.88 KG/M2 | WEIGHT: 161 LBS | HEIGHT: 66 IN | DIASTOLIC BLOOD PRESSURE: 79 MMHG | HEART RATE: 73 BPM

## 2019-11-26 DIAGNOSIS — Z86.19 PERSONAL HISTORY OF OTHER INFECTIOUS AND PARASITIC DISEASES: ICD-10-CM

## 2019-11-26 PROCEDURE — 99213 OFFICE O/P EST LOW 20 MIN: CPT | Mod: GC

## 2019-11-26 NOTE — ASSESSMENT
[FreeTextEntry1] : *Epigastric pain\par -CT 2019 negative\par -r/o gastritis\par -PPI trial for 6 weeks\par \par *H pylori eradicated\par \par *Hyperlipidemia\par -repeat LDL, he is following healthy lifestyle, dietician referral done\par \par *Eczema\par -continue with prednisone\par -dermatology follow up\par \par *Hepatic steatosis\par -hep B vaccine next month\par -repeat LFts next month\par \par *HCM\par flu vaccine given\par follow up in 6 weeks.

## 2019-11-26 NOTE — HISTORY OF PRESENT ILLNESS
[FreeTextEntry1] : follow up  [de-identified] : a 35 yo man with GERD, hx of chronic laryngitis, H.pylori, eradicated and gastritis, nummular eczema is here for follow up\par \par he is still complaining of epigastric pain, per GI PPI trial\par his skin lesions are better after he got one IM injection with a chinese doctor and he is currently on prednisone feeling better by 50%

## 2019-11-26 NOTE — REVIEW OF SYSTEMS
[Abdominal Pain] : abdominal pain [Heartburn] : heartburn [Skin Rash] : skin rash [Negative] : Heme/Lymph [Nausea] : no nausea

## 2019-11-26 NOTE — PHYSICAL EXAM
[No Acute Distress] : no acute distress [Well Nourished] : well nourished [Well-Appearing] : well-appearing [Well Developed] : well developed [PERRL] : pupils equal round and reactive to light [Normal Sclera/Conjunctiva] : normal sclera/conjunctiva [EOMI] : extraocular movements intact [Normal Oropharynx] : the oropharynx was normal [Normal Outer Ear/Nose] : the outer ears and nose were normal in appearance [No JVD] : no jugular venous distention [No Lymphadenopathy] : no lymphadenopathy [Thyroid Normal, No Nodules] : the thyroid was normal and there were no nodules present [Supple] : supple [No Respiratory Distress] : no respiratory distress  [No Accessory Muscle Use] : no accessory muscle use [Clear to Auscultation] : lungs were clear to auscultation bilaterally [Normal Rate] : normal rate  [Normal S1, S2] : normal S1 and S2 [No Murmur] : no murmur heard [Regular Rhythm] : with a regular rhythm [No Carotid Bruits] : no carotid bruits [No Abdominal Bruit] : a ~M bruit was not heard ~T in the abdomen [No Varicosities] : no varicosities [No Edema] : there was no peripheral edema [Pedal Pulses Present] : the pedal pulses are present [No Palpable Aorta] : no palpable aorta [Soft] : abdomen soft [No Extremity Clubbing/Cyanosis] : no extremity clubbing/cyanosis [Non-distended] : non-distended [No HSM] : no HSM [No Masses] : no abdominal mass palpated [Normal Bowel Sounds] : normal bowel sounds [No Spinal Tenderness] : no spinal tenderness [No CVA Tenderness] : no CVA  tenderness [No Joint Swelling] : no joint swelling [Grossly Normal Strength/Tone] : grossly normal strength/tone [Coordination Grossly Intact] : coordination grossly intact [No Focal Deficits] : no focal deficits [Normal Gait] : normal gait [Deep Tendon Reflexes (DTR)] : deep tendon reflexes were 2+ and symmetric [Normal Affect] : the affect was normal [Normal Insight/Judgement] : insight and judgment were intact [de-identified] : LUQ tenderness

## 2019-12-10 ENCOUNTER — OUTPATIENT (OUTPATIENT)
Dept: OUTPATIENT SERVICES | Facility: HOSPITAL | Age: 34
LOS: 1 days | Discharge: HOME | End: 2019-12-10

## 2019-12-10 ENCOUNTER — APPOINTMENT (OUTPATIENT)
Dept: DERMATOLOGY | Facility: CLINIC | Age: 34
End: 2019-12-10
Payer: COMMERCIAL

## 2019-12-10 PROCEDURE — 99212 OFFICE O/P EST SF 10 MIN: CPT

## 2019-12-10 NOTE — ASSESSMENT
[FreeTextEntry1] : 35 y/o M with PMH of vitiligo, chronic itchy rash, acne vulgaris last seen in derm clinic Nov 19, 2019 presents for follow up on biopsy results.\par \par #Chronic itchy rash\par - biopsy reveals signs consistent with eczema (parakeratosis, spongiosis, perivascular lymphocytic infiltrate)\par - pt reports overall improvement since last seen although has new lesions on left flank \par - will give referral to allergist for appointment after PO steroids are finished\par - continue PO steroids (has 9 pills left), patient educated to take them as prescribed\par - RTC in 2 months

## 2019-12-10 NOTE — PHYSICAL EXAM
[Abdomen] : Abdomen [Back] : Back [FreeTextEntry3] : Well circumscribed erythematous papules on right flank area, some arranged in a ring like pattern

## 2019-12-10 NOTE — END OF VISIT
[] : Resident [FreeTextEntry3] : patient interviewed and examined.\par Eczema poorly responsive to usual treatment.\par Improved with prednisone.\par Would possibly benefit from allergy testing because of past recurrences after systemic steroids.\par Continue TAC 0.1 cream\par

## 2019-12-10 NOTE — HISTORY OF PRESENT ILLNESS
[de-identified] : 35 y/o M with PMH of vitiligo, chronic itchy rash, acne vulgaris last seen in derm clinic Nov 19, 2019 presents for follow up on biopsy results. He states the previous rash has disappeared but a new lesion has cropped up. Denies any new soaps or lotion. Still taking PO prednisone.

## 2019-12-11 DIAGNOSIS — E55.9 VITAMIN D DEFICIENCY, UNSPECIFIED: ICD-10-CM

## 2019-12-11 DIAGNOSIS — R10.13 EPIGASTRIC PAIN: ICD-10-CM

## 2019-12-11 DIAGNOSIS — E78.5 HYPERLIPIDEMIA, UNSPECIFIED: ICD-10-CM

## 2019-12-11 LAB
25(OH)D3 SERPL-MCNC: 17 NG/ML
CHOLEST SERPL-MCNC: 209 MG/DL
CHOLEST/HDLC SERPL: 4.4 RATIO
HDLC SERPL-MCNC: 47 MG/DL
LDLC SERPL CALC-MCNC: 143 MG/DL
TRIGL SERPL-MCNC: 181 MG/DL

## 2019-12-20 ENCOUNTER — APPOINTMENT (OUTPATIENT)
Dept: GASTROENTEROLOGY | Facility: CLINIC | Age: 34
End: 2019-12-20
Payer: COMMERCIAL

## 2019-12-20 ENCOUNTER — OUTPATIENT (OUTPATIENT)
Dept: OUTPATIENT SERVICES | Facility: HOSPITAL | Age: 34
LOS: 1 days | Discharge: HOME | End: 2019-12-20

## 2019-12-20 PROCEDURE — 99214 OFFICE O/P EST MOD 30 MIN: CPT

## 2019-12-20 NOTE — ASSESSMENT
[FreeTextEntry1] : A 33 y old man previously heathy seen on 7/2019 for abd pain s/p egd showing gastritis + HP s/p eradication and documentation of cure. He comes again for recurrence of abd pain. The pain is epigastric, moderate, continuous,  not at night, since 7/2019, not radiating, not related to food or bms, worse when lying on his belly. \par \par 1- recurrent abd pain :Most likely musculoskeletal\par The patient points to the left side of his ribs as the location of his pain and it is worse with changing position and sleeping on that area\par egd 5/2019 dr chin gastritis + HP\par s/p triple therapy, HP stool ag negative afterwards \par off PPI \par 3/3/2019 CT abd with ci hep steatosis, colon diverticulosis \par \par heating pad q12h x 7 days did not help \par if not better with heating pad, patient can try  omeprazole daily\par \par 2- CRc screening \par at 49yo unless indicated otherwise \par \par 3- Hx of hepatic steatosis \par high ldl \par Negative: yue, ama, asma, hbs ag, hbs ab, hcv, ceruloplasmin, iron studies negative\par hbc ab neg\par needs hbv vaccine\par healthy diet, weight loss \par RAY fibrosure F0 2019\par

## 2019-12-20 NOTE — REVIEW OF SYSTEMS
[As Noted in HPI] : as noted in HPI [Abdominal Pain] : abdominal pain [Fever] : no fever [Earache] : no earache [Eye Pain] : no eye pain [Chest Pain] : no chest pain [Shortness Of Breath] : no shortness of breath [Vomiting] : no vomiting [Skin Lesions] : no skin lesions [Constipation] : no constipation [Arthralgias] : no arthralgias [Dysuria] : no dysuria [Confused] : no confusion

## 2019-12-20 NOTE — HISTORY OF PRESENT ILLNESS
[de-identified] : \par  [FreeTextEntry1] : A 33 y old man previously heathy seen on 7/2019 for abd pain s/p egd showing gastritis + HP s/p eradication and documentation of cure. He comes again for recurrence of abd pain. The pain is epigastric, moderate, continuous,  not at night, since 7/2019, not radiating, not related to food or bms, worse when lying on his belly. \par \par egd 5/2019 gastritis \par no colonoscopy \par FH non significant \par has 1 bm per day, normal, no blood, no wt loss \par

## 2019-12-20 NOTE — PHYSICAL EXAM
[General Appearance - Alert] : alert [General Appearance - In No Acute Distress] : in no acute distress [PERRL With Normal Accommodation] : pupils were equal in size, round, and reactive to light [Sclera] : the sclera and conjunctiva were normal [Extraocular Movements] : extraocular movements were intact [Outer Ear] : the ears and nose were normal in appearance [Neck Appearance] : the appearance of the neck was normal [Abdominal Aorta] : the abdominal aorta was normal [Bowel Sounds] : normal bowel sounds [] : no respiratory distress [Cervical Lymph Nodes Enlarged Posterior Bilaterally] : posterior cervical [Abdomen Soft] : soft [Abdomen Tenderness] : non-tender [Cranial Nerves] : cranial nerves 2-12 were intact [Abnormal Walk] : normal gait [No CVA Tenderness] : no ~M costovertebral angle tenderness [Oriented To Time, Place, And Person] : oriented to person, place, and time

## 2019-12-23 DIAGNOSIS — R10.13 EPIGASTRIC PAIN: ICD-10-CM

## 2019-12-23 DIAGNOSIS — K76.0 FATTY (CHANGE OF) LIVER, NOT ELSEWHERE CLASSIFIED: ICD-10-CM

## 2019-12-23 DIAGNOSIS — K21.9 GASTRO-ESOPHAGEAL REFLUX DISEASE WITHOUT ESOPHAGITIS: ICD-10-CM

## 2020-01-08 ENCOUNTER — OUTPATIENT (OUTPATIENT)
Dept: OUTPATIENT SERVICES | Facility: HOSPITAL | Age: 35
LOS: 1 days | Discharge: HOME | End: 2020-01-08
Payer: SUBSIDIZED

## 2020-01-08 PROCEDURE — 92133 CPTRZD OPH DX IMG PST SGM ON: CPT | Mod: 26

## 2020-01-08 PROCEDURE — 92014 COMPRE OPH EXAM EST PT 1/>: CPT

## 2020-01-08 PROCEDURE — 76514 ECHO EXAM OF EYE THICKNESS: CPT | Mod: 26

## 2020-01-18 DIAGNOSIS — H02.88A MEIBOMIAN GLAND DYSFUNCTION RIGHT EYE, UPPER AND LOWER EYELIDS: ICD-10-CM

## 2020-01-18 DIAGNOSIS — H11.002 UNSPECIFIED PTERYGIUM OF LEFT EYE: ICD-10-CM

## 2020-01-18 DIAGNOSIS — H11.153 PINGUECULA, BILATERAL: ICD-10-CM

## 2020-01-18 DIAGNOSIS — H18.419 ARCUS SENILIS, UNSPECIFIED EYE: ICD-10-CM

## 2020-01-18 DIAGNOSIS — H02.88B MEIBOMIAN GLAND DYSFUNCTION LEFT EYE, UPPER AND LOWER EYELIDS: ICD-10-CM

## 2020-01-21 ENCOUNTER — APPOINTMENT (OUTPATIENT)
Dept: INTERNAL MEDICINE | Facility: CLINIC | Age: 35
End: 2020-01-21
Payer: COMMERCIAL

## 2020-01-21 ENCOUNTER — OUTPATIENT (OUTPATIENT)
Dept: OUTPATIENT SERVICES | Facility: HOSPITAL | Age: 35
LOS: 1 days | Discharge: HOME | End: 2020-01-21

## 2020-01-21 VITALS
WEIGHT: 162 LBS | HEIGHT: 66 IN | SYSTOLIC BLOOD PRESSURE: 110 MMHG | DIASTOLIC BLOOD PRESSURE: 73 MMHG | HEART RATE: 70 BPM | BODY MASS INDEX: 26.03 KG/M2

## 2020-01-21 DIAGNOSIS — E78.5 HYPERLIPIDEMIA, UNSPECIFIED: ICD-10-CM

## 2020-01-21 DIAGNOSIS — Z00.00 ENCOUNTER FOR GENERAL ADULT MEDICAL EXAMINATION WITHOUT ABNORMAL FINDINGS: ICD-10-CM

## 2020-01-21 DIAGNOSIS — K21.9 GASTRO-ESOPHAGEAL REFLUX DISEASE WITHOUT ESOPHAGITIS: ICD-10-CM

## 2020-01-21 PROCEDURE — 99213 OFFICE O/P EST LOW 20 MIN: CPT | Mod: GC

## 2020-02-05 ENCOUNTER — EMERGENCY (EMERGENCY)
Facility: HOSPITAL | Age: 35
LOS: 0 days | Discharge: HOME | End: 2020-02-05
Attending: EMERGENCY MEDICINE | Admitting: EMERGENCY MEDICINE
Payer: SUBSIDIZED

## 2020-02-05 VITALS
OXYGEN SATURATION: 100 % | TEMPERATURE: 98 F | SYSTOLIC BLOOD PRESSURE: 132 MMHG | DIASTOLIC BLOOD PRESSURE: 84 MMHG | WEIGHT: 164.91 LBS | RESPIRATION RATE: 18 BRPM | HEART RATE: 70 BPM

## 2020-02-05 DIAGNOSIS — R42 DIZZINESS AND GIDDINESS: ICD-10-CM

## 2020-02-05 DIAGNOSIS — R55 SYNCOPE AND COLLAPSE: ICD-10-CM

## 2020-02-05 PROCEDURE — 71046 X-RAY EXAM CHEST 2 VIEWS: CPT | Mod: 26

## 2020-02-05 PROCEDURE — 99284 EMERGENCY DEPT VISIT MOD MDM: CPT

## 2020-02-05 PROCEDURE — 99053 MED SERV 10PM-8AM 24 HR FAC: CPT

## 2020-02-05 PROCEDURE — 93010 ELECTROCARDIOGRAM REPORT: CPT

## 2020-02-05 NOTE — ED PROVIDER NOTE - CLINICAL SUMMARY MEDICAL DECISION MAKING FREE TEXT BOX
EKG, FSG and CXR normal -- patient has had no recurrent symptoms. No murmur, no signs of arrhythmia. Discussed need fo close follow up, good hydration and return for any new, recurrent or worsening sx.

## 2020-02-05 NOTE — ED ADULT NURSE NOTE - NSINTERVENTIONOPT_GEN_ALL_ED
Stretcher Alarms/Enhanced Supervision/Move Toilet (commode/urinal) to patient using "arms reach"/Chair Alarms/Hourly Rounding

## 2020-02-05 NOTE — ED ADULT NURSE NOTE - NSIMPLEMENTINTERV_GEN_ALL_ED
Implemented All Fall with Harm Risk Interventions:  [place] to call system. Call bell, personal items and telephone within reach. Instruct patient to call for assistance. Room bathroom lighting operational. Non-slip footwear when patient is off stretcher. Physically safe environment: no spills, clutter or unnecessary equipment. Stretcher in lowest position, wheels locked, appropriate side rails in place. Provide visual cue, wrist band, yellow gown, etc. Monitor gait and stability. Monitor for mental status changes and reorient to person, place, and time. Review medications for side effects contributing to fall risk. Reinforce activity limits and safety measures with patient and family. Provide visual clues: red socks.

## 2020-02-05 NOTE — ED PROVIDER NOTE - NS ED ROS FT
Review of Systems    Constitutional: (-) fever   Eyes/ENT: (-) vision changes  Cardiovascular: (-) chest pain, (-) syncope (-) palpitations  Respiratory: (-) cough, (-) shortness of breath  Gastrointestinal: (-) vomiting, (-) diarrhea (-)black/bloody stools (-) abdominal pain  Genitourinary:  (-) dysuria   Musculoskeletal: (-) neck pain, (-) back pain, (-) leg pain/swelling  Integumentary: (-) rash, (-) edema  Neurological: (-) headache, (-) confusion  Hematologic: (-) easy bruising   Allergic/Immunologic: (-) pruritus

## 2020-02-05 NOTE — ED PROVIDER NOTE - PHYSICAL EXAMINATION
PHYSICAL EXAM:    GENERAL: Alert, appears stated age, well appearing, non-toxic  SKIN: Warm, pink and dry. MMM.   EYE: Normal lids/conjunctiva, PERRL, EOMI  ENT: Normal hearing, patent oropharynx   NECK: +supple. No meningismus, or JVD  Pulm: Bilateral BS, normal resp effort, no wheezes, stridor, or retractions  CV: RRR, no M/R/G, 2+and = radial pulses  Abd: soft, non-tender, non-distended  Mskel: no erythema, cyanosis, edema. no calf tenderness  Neuro: AAOx3, no sensory/motor deficits, CN 2-12 intact. No speech slurring, pronator drift, facial asymmetry. normal finger-to-nose b/l. 5/5 strength throughout. normal gait. negative romberg.

## 2020-02-05 NOTE — ED ADULT NURSE NOTE - CHPI ED NUR SYMPTOMS NEG
no change in level of consciousness/no loss of consciousness/no blurred vision/no fever/no numbness/no vomiting/no nausea/no weakness/no confusion

## 2020-02-05 NOTE — ED PROVIDER NOTE - OBJECTIVE STATEMENT
33 y/o M with PMH HLD- diet controlled, presents with throat tightness sensation followed by room-spinning sensation/lightheadedness while laying in bed around midnight 3 hrs PTA. no current symptoms. no palliating/provoking factors. no cp/sob/pain anywhere.     no fhx SCD/arrhythmias/CAD. no LOC/head injury/seizure-like activity/black or bloody stools/drug or alcohol use/exertional component.     +hx prior 3 mos ago which he never sought eval for.

## 2020-02-05 NOTE — ED PROVIDER NOTE - NSFOLLOWUPCLINICS_GEN_ALL_ED_FT
A Family Medicine Doctor  Family Medicine  .  [place]   Phone:   Fax:   Follow Up Time: 1-3 Days    [facility]  Neurology  [address] [place] [address], [address]  [place] [address]  Phone: [phone]  Fax:   Follow Up Time: 1-3 Days A Family Medicine Doctor  Family Medicine  .  [place]   Phone:   Fax:   Follow Up Time: 1-3 Days    [facility]  Neurology  [address] [facility], [facility] [address]  [place] [address]  Phone: [phone]  Fax:   Follow Up Time: 1-3 Days    [facility]  Cardiology  [address] [place] [address]  Phone: [phone]  Fax:   Follow Up Time: 1-3 Days    [facility]  Cardiology  [address] [place] [address]  Phone: [phone]  Fax:   Follow Up Time: 1-3 Days

## 2020-02-05 NOTE — ED PROVIDER NOTE - ATTENDING CONTRIBUTION TO CARE
33 yo M, history of HLD, here for assessment of episode of lightheadedness. Patient in USOH, no recent nausea, vomiting, URI, fever, chills. Patient woke up feeling a tight sensation in his throat. Denied CP, dyspnea at this time. Reports he sat up quickly from sleep and felt immediately flushed, then sweaty, clammy and lightheaded. He stood up to go to the bathroom and sx worsened, had near syncope, causing him to sit back down. After sitting sx improved and have not recurred.    No CP, nausea, vomiting, dizziness, weakness, diaphoresis at this time.    VS normal, patient looks well, has clear lungs, RRR, soft, NT, ND abdomen. Awake, alert, oriented. CN II-XII intact, 5/5 strength at upper and lower extremities, no pronator drift, intact finger to nose, steady gait and tandem gait, clear speech    Unclear etiology of throat closing sensation, has no trismus, drooling, stridor, wheezing, evidence of angioedema or pharyngitis.     Likely had reactive vasovagal episode due to abrupt standing. Will get EKG, FSG, CXR and reassess

## 2020-02-05 NOTE — ED PROVIDER NOTE - PATIENT PORTAL LINK FT
You can access the FollowMyHealth Patient Portal offered by [facility] by registering at the following website: [email or web address]. By joining [facility]’s FollowMyHealth portal, you will also be able to view your health information using other applications (apps) compatible with our system.

## 2020-02-05 NOTE — ED PROVIDER NOTE - NS ED MD DISPO DISCHARGE
Patient:   JELENA MCINTOSH            MRN: [id number]            FIN: [id number]              Age:   32 years     Sex:  MALE     :  86   Associated Diagnoses:   None   Author:   MILDRED HARMON     Subjective    Date Of Service:   more alert   occasional agitation   had a pos leak test test   mild secretions   on sedatives         Health Status   Allergies:    Allergies (1) Active Reaction  NKA None Documented    Current medications: Medications (20) Active  Scheduled: (15)  ceFAZolin  2,000 mg 15 mL, Slow IV Push, Q8H  Chlorhexidine gluconate 0.12% ORAL RINSE 15 mL repack  15 mL, Oral Mucosa, Q12H  DexaMETHasone 4 mg tab  8 mg 2 tab, Oral, Q8H  diazePAM  10 mg, Oral, Q12H  DiazePAM 5 mg tab  20 mg 4 tab, Oral, Q6H  DiazePAM 5 mg tab  10 mg 2 tab, Oral, Q6H  Famotidine 8 mg/mL oral susp syringe  40 mg 5 mL, Oral, Q Bedtime  Heparin 5,000 unit/1 mL inj  5,000 unit 1 mL, Subcutaneous, Q8H  methaDONE  10 mg, Oral, Once (scheduled)  methaDONE  15 mg, Oral, Once (scheduled)  methaDONE  10 mg, Oral, Once (scheduled)  MethaDONE 10 mg tab  20 mg 2 tab, Oral, Q12H  OLANZapine 10 mg tab  10 mg 1 tab, Oral, Q12H  Polyvinyl-povidone tears 1.4%-0.6% tears PF ophth soln 0.4 mL  2 drop, Each Eye, Q4H  Senna-docusate sodium 8.6-50 mg tab  2 tab, Oral, BID  Continuous: (3)  fentaNYL 2,500 mcg [25 mcg/hr] + premixed in Sodium Chloride 0.9% 250 mL  250 mL, IV, 2.5 mL/hr  midazolam 100 mg [1 mg/hr] + premixed in Sodium Chloride 0.9% 100 mL  100 mL, IV, 1 mL/hr  propofol 1,000 mg [1 mcg/kg/min] + premixed Solution 100 mL  100 mL, IV, 0.58 mL/hr  PRN: (2)  FentaNYL 100 mcg/2 mL inj SDV  25 mcg 0.5 mL, IV Push, Q15 Minutes  Polyvinyl-povidone tears 1.4%-0.6% tears PF ophth soln 0.4 mL  2 drop, Each Eye, Q2H       Objective   Intake and Output   I & O between:  2020 07:28 TO 2020 07:28  Med Dosing Weight:  96.7  kg   2020  24 Hour Intake:   706.02  ( 7.30 mL/kg )  24 Hour Output:   2450.00            24 Hour Urine/Stool Output:   0.0  24 Hour Balance:   -1743.98           24 Hour Urine Output:   2450.00  ( 1.06 mL/kg/hr )     VS/Measurements   Vitals between:   03-FEB-2020 07:28:17   TO   04-FEB-2020 07:28:17                   LAST RESULT MINIMUM MAXIMUM  Temperature 38 38 38.6  Heart Rate 85 52 139  Respiratory Rate 19 10 28  NISBP           131 129 189  NIDBP           83 76 107  NIMBP           97 94 127  SpO2                    100 97 100  FiO2                    0.4 0.4 0.4      Lines and Tubes:   Lines, Tubes, and Drains   PIVs   Wrist Left inserted 1 day ago.  Hand Left inserted 1 day ago.  Gastric Tubes   Nasogastric Left Nare inserted 2 days ago.   .    General:  No acute distress, Not alert and oriented.    Eye:  Pupils are equal, round and reactive to light.    Neck:  No carotid bruit, No jugular venous distention.    Respiratory:  Lungs are clear to auscultation, Respirations are non-labored, Breath sounds are equal.   Cardiovascular:  Normal rate, Regular rhythm, Good pulses equal in all extremities.   Gastrointestinal:  Soft, Non-tender, Normal bowel sounds.    Integumentary:  Warm, Dry.    Neurologic:  Alert, Not oriented.    follows simple commands      Results Review   General results   Interpretation:   Result title:  XR CHEST 1V  Result status:  Final  Verified by:  FRANNIE PEDRAZA on 02/03/2020 1:37  IMPRESSION:Satisfactory aeration, improved findings, lines in position.  Follow-up.             Labs between:  03-FEB-2020 07:28 to 04-FEB-2020 07:28  CBC:                 WBC  HgB  Hct  Plt  MCV  RDW   04-FEB-2020 9.0  (L) 11.5  (L) 34.2  Platelets clumped but appear adequate on smear.  92.9  12.6  DIFF:                 Seg  Neutroph//ABS  Lymph//ABS  Mono//ABS  EOS/ABS  04-FEB-2020 NOT APPLICABLE  75 // 6.7 16 // 1.4 8 // 0.8 0 // (L) 0.0  BMP:                 Na  Cl  BUN  Glu   04-FEB-2020 137  104  13  (H) 115                              K  CO2  Cr  Ca                              4.1  24   (L) 0.61  8.6   POC GLU:                 Latest Result  Latest Date  Minimum  Min Date  Maximum  Max Date                             86 04-FEB-2020 86 04-FEB-2020 (H) 125  03-FEB-2020  Blood Gas:            Ph  PCO2  PO2  BiCarb  BaseExcess   Arterial:  04-FEB-2020 7.38  48  (H) 148  28  2                  (Inserted Image. Unable to display)       Impression and Plan   Impression:   Acute hypoxemic resp. failure. Intubated/sedated. /18/40% with P+5.  Self extubated, complicated by subglottic edema with resultant striodr/upper airway obstruction  MSSA pneumonia, lastes CXR clear. No fever. WBC < 12K   Substance abuse with high tolerance to sedation   Mild elevated of TG on propofol, latest < 300   Mild protein-didi malnutrition witj Albumin < 3   Mild elevation in LFTs but remains < 100   Recommendations:   Vent support with cpap for now.   Extubate potentially today   Nebs   Decadron to IV post extubation at 6 mg q8 hrs (from 8 q8 hrs)  Cefazolin   Pepcid and Heparin   Fentanyl and Versed drips ongoing - moderate to heavy sedation until upper airway edema subside  Vitamins. TF   IV hydration   Labs/CXR reviewed   Full code   ACP d/w the MICU team   Remains critically ill   CC30   Home

## 2020-02-05 NOTE — ED ADULT NURSE NOTE - OBJECTIVE STATEMENT
The patient is a 34y Male complaining of dizziness. pt came in to the ED accompanied by wife ambulatory with complains of dizines and near syncopal episode, he denies alcohol/illicit drug use, no chest pain, no sob, denies nausea or vomiting, no drh, denies any med/surg hx. pt is aaox4 and ambulatory

## 2020-02-06 NOTE — HISTORY OF PRESENT ILLNESS
[FreeTextEntry1] : scheduled f/u [de-identified] : Pt is a 33 yo M with Hx of GERD, H. pylori eradication, nummular eczema and hepatic steatosis presenting for a scheduled appointment. Pt is asymptomatic besides his chronic intermittent eczema managed by his dermatologist (last visit Dec 2019), which he has a f/u appointment in end of this month. He denies any of symptoms of GERD and his GI doctor said told him at the last visit (Dec 2019) no more f/u are necessary unless symptoms relapse. Pt states he was first told about his elevated lipid level in June 2019. Pt states he started to do more exercise and cut down fatty food since then. His latest lab (dec 2019) was Triglyceride 181, Total cholesterol 209 and . Pt was due for his last HepB vaccine and administered today. He is up to date with his flu shot (Nov 19 2019).

## 2020-02-06 NOTE — PHYSICAL EXAM
[Normal] : soft, non-tender, non-distended, no masses palpated, no HSM and normal bowel sounds [de-identified] : rash on his neck  [de-identified] : rash on his neck and jaw

## 2020-02-06 NOTE — REVIEW OF SYSTEMS
[Fever] : no fever [Discharge] : no discharge [Night Sweats] : no night sweats [Vision Problems] : no vision problems [Nasal Discharge] : no nasal discharge [Earache] : no earache [Orthopena] : no orthopnea [Dysuria] : no dysuria [Chest Pain] : no chest pain [Hematuria] : no hematuria [Headache] : no headache [Memory Loss] : no memory loss [Mole Changes] : no mole changes [Easy Bleeding] : no easy bleeding [Suicidal] : not suicidal

## 2020-02-06 NOTE — ASSESSMENT
[FreeTextEntry1] : Pt is a 35 yo M with Hx of GERD, H. pylori eradication, nummular eczema and hepatic steatosis presenting for a scheduled appointment.\par \par *Hyperlipidemia\par -latest lab (Dec 2019) showed improved LDL but worsened triglyceride levels\par -Pt educated on continued exercise and dietary modification especially decrease intake of high starch food\par \par *GERD / H pylori \par -asymptomatic \par -finished final GI f/u\par -Pantoprazole DCed by GI \par \par *Eczema\par -latest f/u was Dec 2019\par -biopsy was inconclusive per Pt. Next f/u and referral to allergy clinic has been scheduled for this month.\par -Prednisone has been D/C ed by dermatologist per Pt\par \par *Hepatic steatosis\par -last hep B vaccine administered today \par -LFts ordered \par \par *HCM\par -blood work: CBC, CMP (including LFT) lipid panel, A1C\par -follow up in 6 month \par

## 2020-02-11 ENCOUNTER — OUTPATIENT (OUTPATIENT)
Dept: OUTPATIENT SERVICES | Facility: HOSPITAL | Age: 35
LOS: 1 days | Discharge: HOME | End: 2020-02-11

## 2020-02-11 ENCOUNTER — APPOINTMENT (OUTPATIENT)
Dept: DERMATOLOGY | Facility: CLINIC | Age: 35
End: 2020-02-11
Payer: COMMERCIAL

## 2020-02-11 DIAGNOSIS — L80 VITILIGO: ICD-10-CM

## 2020-02-11 DIAGNOSIS — L30.0 NUMMULAR DERMATITIS: ICD-10-CM

## 2020-02-11 PROCEDURE — 99212 OFFICE O/P EST SF 10 MIN: CPT | Mod: GC

## 2020-02-11 RX ORDER — PREDNISONE 10 MG/1
10 TABLET ORAL
Qty: 21 | Refills: 0 | Status: DISCONTINUED | COMMUNITY
Start: 2019-11-19 | End: 2020-02-11

## 2020-02-11 NOTE — ASSESSMENT
[FreeTextEntry1] : 33 y/o M with PMH of vitiligo, chronic itchy rash, acne vulgaris last seen in derm clinic Nov 19, 2019 presents for follow up on biopsy results. \par \par #nummular eczema \par - biopsy reveals signs consistent with eczema (parakeratosis, spongiosis, perivascular lymphocytic infiltrate)\par - pt reports continued pruritus with new lesions \par - f/u with allergist (2 visits pending for remaining allergy testing)  \par - can try sarna for relief since cannot use any PO medications while allergy testing \par - completed course of PO steroids\par - RTC after allergy testing completed \par

## 2020-02-11 NOTE — PHYSICAL EXAM
[Alert] : alert [Oriented x 3] : ~L oriented x 3 [Well Nourished] : well nourished [Conjunctiva Non-injected] : conjunctiva non-injected [No Edema] : no edema [Neck] : Neck [Chest] : Chest [Abdomen] : Abdomen [FreeTextEntry3] : +nummular eczema

## 2020-02-11 NOTE — END OF VISIT
[] : Resident [FreeTextEntry3] : \par Unable to prescribe antihistamines, needs to complete allergy testing

## 2020-02-11 NOTE — HISTORY OF PRESENT ILLNESS
[de-identified] : 33 y/o M with PMH of vitiligo, chronic itchy rash, acne vulgaris last seen in derm clinic 12/10/19 presents for follow up for nummular eczema. \par \par Completed course of PO prednisone back in December. Patient was referred for allergy testing last visit and said he went to 1/3 visits so far - next visit is this week, so far negative. Says eczema is still itchy - new rashes around waist and bilateral neck by his beard.

## 2020-05-08 ENCOUNTER — APPOINTMENT (OUTPATIENT)
Dept: GASTROENTEROLOGY | Facility: CLINIC | Age: 35
End: 2020-05-08

## 2020-05-08 ENCOUNTER — EMERGENCY (EMERGENCY)
Facility: HOSPITAL | Age: 35
LOS: 0 days | Discharge: HOME | End: 2020-05-08
Attending: EMERGENCY MEDICINE | Admitting: EMERGENCY MEDICINE
Payer: SUBSIDIZED

## 2020-05-08 VITALS
OXYGEN SATURATION: 98 % | TEMPERATURE: 98 F | HEART RATE: 79 BPM | DIASTOLIC BLOOD PRESSURE: 67 MMHG | RESPIRATION RATE: 18 BRPM | SYSTOLIC BLOOD PRESSURE: 113 MMHG

## 2020-05-08 VITALS
DIASTOLIC BLOOD PRESSURE: 67 MMHG | TEMPERATURE: 99 F | HEART RATE: 76 BPM | SYSTOLIC BLOOD PRESSURE: 123 MMHG | OXYGEN SATURATION: 98 % | RESPIRATION RATE: 18 BRPM

## 2020-05-08 DIAGNOSIS — R10.13 EPIGASTRIC PAIN: ICD-10-CM

## 2020-05-08 DIAGNOSIS — R10.9 UNSPECIFIED ABDOMINAL PAIN: ICD-10-CM

## 2020-05-08 LAB
ALBUMIN SERPL ELPH-MCNC: 4.6 G/DL — SIGNIFICANT CHANGE UP (ref 3.5–5.2)
ALP SERPL-CCNC: 72 U/L — SIGNIFICANT CHANGE UP (ref 30–115)
ALT FLD-CCNC: 22 U/L — SIGNIFICANT CHANGE UP (ref 0–41)
ANION GAP SERPL CALC-SCNC: 13 MMOL/L — SIGNIFICANT CHANGE UP (ref 7–14)
APPEARANCE UR: CLEAR — SIGNIFICANT CHANGE UP
AST SERPL-CCNC: 20 U/L — SIGNIFICANT CHANGE UP (ref 0–41)
BASOPHILS # BLD AUTO: 0.03 K/UL — SIGNIFICANT CHANGE UP (ref 0–0.2)
BASOPHILS NFR BLD AUTO: 0.5 % — SIGNIFICANT CHANGE UP (ref 0–1)
BILIRUB SERPL-MCNC: 0.5 MG/DL — SIGNIFICANT CHANGE UP (ref 0.2–1.2)
BILIRUB UR-MCNC: NEGATIVE — SIGNIFICANT CHANGE UP
BUN SERPL-MCNC: 11 MG/DL — SIGNIFICANT CHANGE UP (ref 10–20)
CALCIUM SERPL-MCNC: 8.9 MG/DL — SIGNIFICANT CHANGE UP (ref 8.5–10.1)
CHLORIDE SERPL-SCNC: 103 MMOL/L — SIGNIFICANT CHANGE UP (ref 98–110)
CO2 SERPL-SCNC: 24 MMOL/L — SIGNIFICANT CHANGE UP (ref 17–32)
COLOR SPEC: YELLOW — SIGNIFICANT CHANGE UP
CREAT SERPL-MCNC: 0.8 MG/DL — SIGNIFICANT CHANGE UP (ref 0.7–1.5)
DIFF PNL FLD: NEGATIVE — SIGNIFICANT CHANGE UP
EOSINOPHIL # BLD AUTO: 0.05 K/UL — SIGNIFICANT CHANGE UP (ref 0–0.7)
EOSINOPHIL NFR BLD AUTO: 0.8 % — SIGNIFICANT CHANGE UP (ref 0–8)
GLUCOSE SERPL-MCNC: 97 MG/DL — SIGNIFICANT CHANGE UP (ref 70–99)
GLUCOSE UR QL: NEGATIVE — SIGNIFICANT CHANGE UP
HCT VFR BLD CALC: 45.7 % — SIGNIFICANT CHANGE UP (ref 42–52)
HGB BLD-MCNC: 15.8 G/DL — SIGNIFICANT CHANGE UP (ref 14–18)
IMM GRANULOCYTES NFR BLD AUTO: 0.2 % — SIGNIFICANT CHANGE UP (ref 0.1–0.3)
KETONES UR-MCNC: NEGATIVE — SIGNIFICANT CHANGE UP
LEUKOCYTE ESTERASE UR-ACNC: NEGATIVE — SIGNIFICANT CHANGE UP
LIDOCAIN IGE QN: 33 U/L — SIGNIFICANT CHANGE UP (ref 7–60)
LYMPHOCYTES # BLD AUTO: 1.78 K/UL — SIGNIFICANT CHANGE UP (ref 1.2–3.4)
LYMPHOCYTES # BLD AUTO: 27.1 % — SIGNIFICANT CHANGE UP (ref 20.5–51.1)
MCHC RBC-ENTMCNC: 29.8 PG — SIGNIFICANT CHANGE UP (ref 27–31)
MCHC RBC-ENTMCNC: 34.6 G/DL — SIGNIFICANT CHANGE UP (ref 32–37)
MCV RBC AUTO: 86.1 FL — SIGNIFICANT CHANGE UP (ref 80–94)
MONOCYTES # BLD AUTO: 0.41 K/UL — SIGNIFICANT CHANGE UP (ref 0.1–0.6)
MONOCYTES NFR BLD AUTO: 6.2 % — SIGNIFICANT CHANGE UP (ref 1.7–9.3)
NEUTROPHILS # BLD AUTO: 4.3 K/UL — SIGNIFICANT CHANGE UP (ref 1.4–6.5)
NEUTROPHILS NFR BLD AUTO: 65.2 % — SIGNIFICANT CHANGE UP (ref 42.2–75.2)
NITRITE UR-MCNC: NEGATIVE — SIGNIFICANT CHANGE UP
NRBC # BLD: 0 /100 WBCS — SIGNIFICANT CHANGE UP (ref 0–0)
PH UR: 6 — SIGNIFICANT CHANGE UP (ref 5–8)
PLATELET # BLD AUTO: 219 K/UL — SIGNIFICANT CHANGE UP (ref 130–400)
POTASSIUM SERPL-MCNC: 4.5 MMOL/L — SIGNIFICANT CHANGE UP (ref 3.5–5)
POTASSIUM SERPL-SCNC: 4.5 MMOL/L — SIGNIFICANT CHANGE UP (ref 3.5–5)
PROT SERPL-MCNC: 7.4 G/DL — SIGNIFICANT CHANGE UP (ref 6–8)
PROT UR-MCNC: NEGATIVE — SIGNIFICANT CHANGE UP
RBC # BLD: 5.31 M/UL — SIGNIFICANT CHANGE UP (ref 4.7–6.1)
RBC # FLD: 13.2 % — SIGNIFICANT CHANGE UP (ref 11.5–14.5)
SODIUM SERPL-SCNC: 140 MMOL/L — SIGNIFICANT CHANGE UP (ref 135–146)
SP GR SPEC: 1.03 — SIGNIFICANT CHANGE UP (ref 1.01–1.02)
UROBILINOGEN FLD QL: SIGNIFICANT CHANGE UP
WBC # BLD: 6.58 K/UL — SIGNIFICANT CHANGE UP (ref 4.8–10.8)
WBC # FLD AUTO: 6.58 K/UL — SIGNIFICANT CHANGE UP (ref 4.8–10.8)

## 2020-05-08 PROCEDURE — 74177 CT ABD & PELVIS W/CONTRAST: CPT | Mod: 26

## 2020-05-08 PROCEDURE — 99285 EMERGENCY DEPT VISIT HI MDM: CPT

## 2020-05-08 RX ORDER — SODIUM CHLORIDE 9 MG/ML
1000 INJECTION INTRAMUSCULAR; INTRAVENOUS; SUBCUTANEOUS ONCE
Refills: 0 | Status: COMPLETED | OUTPATIENT
Start: 2020-05-08 | End: 2020-05-08

## 2020-05-08 RX ORDER — FAMOTIDINE 10 MG/ML
20 INJECTION INTRAVENOUS ONCE
Refills: 0 | Status: COMPLETED | OUTPATIENT
Start: 2020-05-08 | End: 2020-05-08

## 2020-05-08 RX ADMIN — SODIUM CHLORIDE 1000 MILLILITER(S): 9 INJECTION INTRAMUSCULAR; INTRAVENOUS; SUBCUTANEOUS at 14:45

## 2020-05-08 RX ADMIN — FAMOTIDINE 104 MILLIGRAM(S): 10 INJECTION INTRAVENOUS at 14:45

## 2020-05-08 NOTE — ED PROVIDER NOTE - PHYSICAL EXAMINATION
CONST: NAD  EYES: Sclera and conjunctiva clear.   ENT: No nasal discharge. Oropharynx normal appearing, no erythema or exudates. No abscess or swelling. Uvula midline.   NECK: Non-tender, no meningeal signs. normal ROM. supple   CARD: S1 S2; No jvd  RESP: Equal BS B/L, No wheezes, rhonchi or rales. No distress  GI: Epigastric tenderness. no cva tenderness. normal BS  MS: Normal ROM in all extremities. pulses 2 +. no calf tenderness or swelling  SKIN: Warm, dry, no acute rashes. Good turgor  NEURO: A&Ox4, No focal deficits. Strength 5/5 with no sensory deficits.

## 2020-05-08 NOTE — ED PROVIDER NOTE - NS ED ROS FT
Constitutional: (-) fever  Eyes/ENT: (-) blurry vision, (-) epistaxis  Cardiovascular: (-) chest pain, (-) syncope  Respiratory: (-) cough, (-) shortness of breath  Gastrointestinal: (+) abd pain, (-) vomiting, (-) diarrhea  : (-) dysuria, (-) hematuria  Musculoskeletal: (-) neck pain, (-) back pain, (-) joint pain  Integumentary: (-) rash, (-) edema  Neurological: (-) headache, (-) altered mental status  Allergic/Immunologic: (-) pruritus

## 2020-05-08 NOTE — ED PROVIDER NOTE - PATIENT PORTAL LINK FT
You can access the FollowMyHealth Patient Portal offered by [facility] by registering at the following website: [email or web address]. By joining [facility]’s FollowMyHealth portal, you will also be able to view your health information using other applications (apps) compatible with our system.

## 2020-05-08 NOTE — ED PROVIDER NOTE - OBJECTIVE STATEMENT
34y M no ppmh presents for eval of abd pain. Pt has 3mo of intermittent cramping abd pain, no aggravating or relieving factors. Associated decreased appitites. Denies fever, ha, cp, sob, weakness, numbness

## 2020-06-08 ENCOUNTER — APPOINTMENT (OUTPATIENT)
Dept: CARDIOLOGY | Facility: CLINIC | Age: 35
End: 2020-06-08

## 2020-06-23 ENCOUNTER — APPOINTMENT (OUTPATIENT)
Dept: INTERNAL MEDICINE | Facility: CLINIC | Age: 35
End: 2020-06-23
Payer: COMMERCIAL

## 2020-06-23 ENCOUNTER — OUTPATIENT (OUTPATIENT)
Dept: OUTPATIENT SERVICES | Facility: HOSPITAL | Age: 35
LOS: 1 days | Discharge: HOME | End: 2020-06-23

## 2020-06-23 VITALS
SYSTOLIC BLOOD PRESSURE: 121 MMHG | DIASTOLIC BLOOD PRESSURE: 84 MMHG | BODY MASS INDEX: 26.2 KG/M2 | HEIGHT: 66 IN | TEMPERATURE: 97.8 F | HEART RATE: 72 BPM | WEIGHT: 163 LBS

## 2020-06-23 PROCEDURE — 99213 OFFICE O/P EST LOW 20 MIN: CPT | Mod: GC

## 2020-06-30 DIAGNOSIS — K21.9 GASTRO-ESOPHAGEAL REFLUX DISEASE WITHOUT ESOPHAGITIS: ICD-10-CM

## 2020-07-19 NOTE — REVIEW OF SYSTEMS
[Abdominal Pain] : abdominal pain [Heartburn] : heartburn [Negative] : Heme/Lymph [Night Sweats] : no night sweats [Chest Pain] : no chest pain [Fever] : no fever [Palpitations] : no palpitations [Shortness Of Breath] : no shortness of breath [Nausea] : no nausea [FreeTextEntry7] : Patient complains of epigastric pain and left mid-umbilical "swelling"  [Vomiting] : no vomiting

## 2020-07-19 NOTE — HISTORY OF PRESENT ILLNESS
[FreeTextEntry1] : Pt is a 33 yo M with Hx of GERD, H. pylori eradication, nummular eczema and hepatic steatosis presenting for a 6 month follow up.  [de-identified] : Pt is a 33 yo M with Hx of GERD, H. pylori eradication, nummular eczema and hepatic steatosis presenting for a scheduled appointment. Patient endorses a "swelling sensation" in his mid-left umbilicus which is exacerbated by sleeping on the left side as well as eating,  w/o alleviating factors.  Patient does endorse epigastric pain that is associated with PO intake but endorses no weight loss.  Patient has followed up with allergy in regards to his eczema and will follow up with dermatology.

## 2020-07-19 NOTE — PHYSICAL EXAM
[No Acute Distress] : no acute distress [Well Nourished] : well nourished [Well Developed] : well developed [No Respiratory Distress] : no respiratory distress  [No Accessory Muscle Use] : no accessory muscle use [Clear to Auscultation] : lungs were clear to auscultation bilaterally [Normal Rate] : normal rate  [Regular Rhythm] : with a regular rhythm [Normal S1, S2] : normal S1 and S2 [Non Tender] : non-tender [Soft] : abdomen soft [Non-distended] : non-distended [No HSM] : no HSM [Normal Bowel Sounds] : normal bowel sounds [Coordination Grossly Intact] : coordination grossly intact [No Focal Deficits] : no focal deficits [de-identified] : Hyperkeratosis on the knuckles of hands b/l, neck and chest.

## 2020-07-19 NOTE — ASSESSMENT
[FreeTextEntry1] : Pt is a 33 yo M with Hx of GERD, H. pylori eradication, nummular eczema and hepatic steatosis presenting for a scheduled appointment.\par \par #GERD recurrence\par Patient to be placed on Protonix 40mg PO daily\par Simethicone 80mg PO qid added to regimen \par f/u in 2 months\par if no resolution of symptoms-->Referral for GI. \par \par \par *Eczema\par -latest f/u was Dec 2019\par -biopsy conssitent with eczema as per dermatology 5/2020\par Patient has undergone allergy w/u and will follow up with dermatology. \par \par *Hepatic steatosis\par -last hep B vaccine administered last visit\par -Patient to undergo blood work to eval for LFTs. \par \par *HCM\par -blood work: CBC, CMP (including LFT) lipid panel, A1C\par -follow up in 6 month

## 2020-07-24 ENCOUNTER — OUTPATIENT (OUTPATIENT)
Dept: OUTPATIENT SERVICES | Facility: HOSPITAL | Age: 35
LOS: 1 days | Discharge: HOME | End: 2020-07-24

## 2020-07-24 ENCOUNTER — APPOINTMENT (OUTPATIENT)
Dept: GASTROENTEROLOGY | Facility: CLINIC | Age: 35
End: 2020-07-24
Payer: COMMERCIAL

## 2020-07-24 VITALS
SYSTOLIC BLOOD PRESSURE: 118 MMHG | WEIGHT: 160 LBS | HEART RATE: 52 BPM | DIASTOLIC BLOOD PRESSURE: 79 MMHG | BODY MASS INDEX: 25.71 KG/M2 | HEIGHT: 66 IN | TEMPERATURE: 96.1 F

## 2020-07-24 DIAGNOSIS — Z12.11 ENCOUNTER FOR SCREENING FOR MALIGNANT NEOPLASM OF COLON: ICD-10-CM

## 2020-07-24 PROCEDURE — 99214 OFFICE O/P EST MOD 30 MIN: CPT

## 2020-07-24 RX ORDER — PANTOPRAZOLE 40 MG/1
40 TABLET, DELAYED RELEASE ORAL DAILY
Qty: 60 | Refills: 0 | Status: DISCONTINUED | COMMUNITY
Start: 2019-11-26 | End: 2020-07-24

## 2020-07-24 RX ORDER — SIMETHICONE 80 MG/1
80 TABLET, CHEWABLE ORAL
Qty: 120 | Refills: 1 | Status: DISCONTINUED | COMMUNITY
Start: 2020-06-23 | End: 2020-07-24

## 2020-07-24 NOTE — PHYSICAL EXAM
[General Appearance - Alert] : alert [General Appearance - Well Developed] : well developed [General Appearance - Well Nourished] : well nourished [General Appearance - In No Acute Distress] : in no acute distress [General Appearance - Well-Appearing] : healthy appearing [Outer Ear] : the ears and nose were normal in appearance [Neck Appearance] : the appearance of the neck was normal [Respiration, Rhythm And Depth] : normal respiratory rhythm and effort [Exaggerated Use Of Accessory Muscles For Inspiration] : no accessory muscle use [Heart Sounds] : normal S1 and S2 [Heart Rate And Rhythm] : heart rate was normal and rhythm regular [Murmurs] : no murmurs [Auscultation Breath Sounds / Voice Sounds] : lungs were clear to auscultation bilaterally [Abdomen Soft] : soft [Heart Sounds Pericardial Friction Rub] : no pericardial rub [Abdomen Tenderness] : non-tender [] : no hepato-splenomegaly [Abdomen Mass (___ Cm)] : no abdominal mass palpated

## 2020-07-24 NOTE — REVIEW OF SYSTEMS
[As Noted in HPI] : as noted in HPI [Chills] : no chills [Fever] : no fever [Cough] : no cough [Shortness Of Breath] : no shortness of breath [Wheezing] : no wheezing [Constipation] : no constipation [Vomiting] : no vomiting [Diarrhea] : no diarrhea

## 2020-07-24 NOTE — HISTORY OF PRESENT ILLNESS
[Nausea] : denies nausea [Constipation] : denies constipation [Diarrhea] : denies diarrhea [Abdominal Pain] : abdominal pain worsened [Abdominal Swelling] : abdominal swelling worsened [de-identified] : The patient is a 35 year-old male with a PMH of GERD (on pantoprazole), s/p EGD last year for left upper quadrant abdominal pain w/ gastritis and H.pylori s/p triple therapy w/ eradication, presenting with unresolved left upper quadrant abdominal pain. He reports the pain has been crampy, ranging from 4/10 to 6/10 in severity, nonradiating, worse on exertion (lifting ~25 lb objects) and after eating, when he sometimes feels like his "intestines are pushing out." He denies any change in stool, including constipation, diarrhea, or black or bloody stools. He denies other complaints at this time.

## 2020-07-31 ENCOUNTER — OUTPATIENT (OUTPATIENT)
Dept: OUTPATIENT SERVICES | Facility: HOSPITAL | Age: 35
LOS: 1 days | Discharge: HOME | End: 2020-07-31

## 2020-07-31 ENCOUNTER — LABORATORY RESULT (OUTPATIENT)
Age: 35
End: 2020-07-31

## 2020-07-31 DIAGNOSIS — Z11.59 ENCOUNTER FOR SCREENING FOR OTHER VIRAL DISEASES: ICD-10-CM

## 2020-08-03 ENCOUNTER — RESULT REVIEW (OUTPATIENT)
Age: 35
End: 2020-08-03

## 2020-08-03 ENCOUNTER — OUTPATIENT (OUTPATIENT)
Dept: OUTPATIENT SERVICES | Facility: HOSPITAL | Age: 35
LOS: 1 days | Discharge: HOME | End: 2020-08-03
Payer: SUBSIDIZED

## 2020-08-03 ENCOUNTER — TRANSCRIPTION ENCOUNTER (OUTPATIENT)
Age: 35
End: 2020-08-03

## 2020-08-03 VITALS
RESPIRATION RATE: 18 BRPM | OXYGEN SATURATION: 98 % | SYSTOLIC BLOOD PRESSURE: 120 MMHG | DIASTOLIC BLOOD PRESSURE: 74 MMHG | HEART RATE: 87 BPM

## 2020-08-03 VITALS
SYSTOLIC BLOOD PRESSURE: 114 MMHG | HEIGHT: 66 IN | DIASTOLIC BLOOD PRESSURE: 66 MMHG | TEMPERATURE: 98 F | HEART RATE: 85 BPM | WEIGHT: 160.06 LBS | RESPIRATION RATE: 17 BRPM

## 2020-08-03 PROCEDURE — 88305 TISSUE EXAM BY PATHOLOGIST: CPT | Mod: 26

## 2020-08-03 PROCEDURE — 45380 COLONOSCOPY AND BIOPSY: CPT

## 2020-08-03 RX ORDER — SODIUM CHLORIDE 9 MG/ML
1000 INJECTION, SOLUTION INTRAVENOUS
Refills: 0 | Status: DISCONTINUED | OUTPATIENT
Start: 2020-08-03 | End: 2020-08-18

## 2020-08-03 NOTE — CHART NOTE - NSCHARTNOTEFT_GEN_A_CORE
PACU ANESTHESIA ADMISSION NOTE      Procedure:   Post op diagnosis:      ____  Intubated  TV:______       Rate: ______      FiO2: ______    __x__  Patent Airway    ____  Full return of protective reflexes    ____  Full recovery from anesthesia / back to baseline     Vitals:   T:  98         R:   12               BP:      93/54             Sat:    100%               P:  78      Mental Status:  __x__ Awake   _____ Alert   _____ Drowsy   _____ Sedated    Nausea/Vomiting:  __x__ NO  ______Yes,   See Post - Op Orders          Pain Scale (0-10):  _____    Treatment: ____ None    ___x_ See Post - Op/PCA Orders    Post - Operative Fluids:   ____ Oral   ___x_ See Post - Op Orders    Plan: Discharge:   __x__Home       _____Floor     _____Critical Care    _____  Other:_________________    Comments: Uneventful intraoperative course. No anesthesia issues or complications noted.  Patient stable upon arrival to PACU. Report given to RN. Discharge when criteria met.

## 2020-08-03 NOTE — ASU DISCHARGE PLAN (ADULT/PEDIATRIC) - CALL YOUR DOCTOR IF YOU HAVE ANY OF THE FOLLOWING:
Bleeding that does not stop/Pain not relieved by Medications/Nausea and vomiting that does not stop/Inability to tolerate liquids or foods/Fever greater than (need to indicate Fahrenheit or Celsius)

## 2020-08-05 LAB — SURGICAL PATHOLOGY STUDY: SIGNIFICANT CHANGE UP

## 2020-08-06 DIAGNOSIS — D12.4 BENIGN NEOPLASM OF DESCENDING COLON: ICD-10-CM

## 2020-08-06 DIAGNOSIS — K57.30 DIVERTICULOSIS OF LARGE INTESTINE WITHOUT PERFORATION OR ABSCESS WITHOUT BLEEDING: ICD-10-CM

## 2020-08-06 DIAGNOSIS — Z12.11 ENCOUNTER FOR SCREENING FOR MALIGNANT NEOPLASM OF COLON: ICD-10-CM

## 2020-08-21 ENCOUNTER — APPOINTMENT (OUTPATIENT)
Dept: GASTROENTEROLOGY | Facility: CLINIC | Age: 35
End: 2020-08-21
Payer: COMMERCIAL

## 2020-08-21 ENCOUNTER — OUTPATIENT (OUTPATIENT)
Dept: OUTPATIENT SERVICES | Facility: HOSPITAL | Age: 35
LOS: 1 days | Discharge: HOME | End: 2020-08-21

## 2020-08-21 VITALS
BODY MASS INDEX: 25.71 KG/M2 | HEART RATE: 69 BPM | RESPIRATION RATE: 18 BRPM | WEIGHT: 160 LBS | DIASTOLIC BLOOD PRESSURE: 70 MMHG | HEIGHT: 66 IN | SYSTOLIC BLOOD PRESSURE: 107 MMHG | TEMPERATURE: 98.2 F

## 2020-08-21 DIAGNOSIS — M79.10 MYALGIA, UNSPECIFIED SITE: ICD-10-CM

## 2020-08-21 PROCEDURE — 99214 OFFICE O/P EST MOD 30 MIN: CPT

## 2020-08-21 NOTE — ASSESSMENT
[FreeTextEntry1] : The patient is a 35 year-old male with a PMH of GERD , H. pylori s/p eradication came for follow up visit of abdominal pain and swelling.\par \par #LUQ and epigastric pain- likely musculoskeletal\par - Patient states there is swelling on LUQ but was not seen on physical exam\par - Pain worse with position changes\par - Patient tried heat pack BID with no improvement. \par - Patient had EGD, colonoscopy and CT scan which did not identify an etiology that can be explained the pain related to GI system. \par - Patient States a doctor suggested to have an injection at the site to help with the pain. He can not recall which doctor\par - Pain management referral\par \par #GERD:\par - Improved on life style and dietary modification\par - PPI qd\par - H. pylori eradicated

## 2020-08-21 NOTE — PHYSICAL EXAM
[General Appearance - Alert] : alert [General Appearance - In No Acute Distress] : in no acute distress [Neck Appearance] : the appearance of the neck was normal [Jugular Venous Distention Increased] : there was no jugular-venous distention [Neck Cervical Mass (___cm)] : no neck mass was observed [Thyroid Diffuse Enlargement] : the thyroid was not enlarged [Thyroid Nodule] : there were no palpable thyroid nodules [Auscultation Breath Sounds / Voice Sounds] : lungs were clear to auscultation bilaterally [Heart Sounds Gallop] : no gallops [Heart Rate And Rhythm] : heart rate was normal and rhythm regular [Heart Sounds] : normal S1 and S2 [Heart Sounds Pericardial Friction Rub] : no pericardial rub [Murmurs] : no murmurs [Bowel Sounds] : normal bowel sounds [] : no hepato-splenomegaly [Abdomen Tenderness] : non-tender [Abdomen Soft] : soft [Abdomen Mass (___ Cm)] : no abdominal mass palpated [Nail Clubbing] : no clubbing  or cyanosis of the fingernails [Musculoskeletal - Swelling] : no joint swelling seen [Abnormal Walk] : normal gait [Motor Tone] : muscle strength and tone were normal

## 2020-08-21 NOTE — HISTORY OF PRESENT ILLNESS
[de-identified] : The patient is a 35 year-old male with a PMH of GERD , H. pylori s/p eradication came for follow up visit. Patient was seen on July 2020 for LUQ abdominal pain and underwent colonoscopy which showed Right side diverticula and a polyp.  Patient still complains of LUQ and epigastric swelling and abdominal pain, severity 4/5, worsening with bending forward.  He also complains of fatigue. He also states occasional heart burn related to spicy food, improved after having heathy food as per patient. Responded to PPI.\par \par EGD: 5/13/19: Gastritis , h. pylori +  eradication confirmed on  July 2019\par Colonoscopy 8/3/20: Scattered right side diverticulosis,  5mm descending colon tubular adenoma\par \par CT scan: 5/2020: hepatic steatosis otherwise unremarkable

## 2020-08-25 ENCOUNTER — APPOINTMENT (OUTPATIENT)
Dept: INTERNAL MEDICINE | Facility: CLINIC | Age: 35
End: 2020-08-25
Payer: COMMERCIAL

## 2020-08-25 ENCOUNTER — OUTPATIENT (OUTPATIENT)
Dept: OUTPATIENT SERVICES | Facility: HOSPITAL | Age: 35
LOS: 1 days | Discharge: HOME | End: 2020-08-25

## 2020-08-25 VITALS
WEIGHT: 158 LBS | BODY MASS INDEX: 25.39 KG/M2 | TEMPERATURE: 98 F | DIASTOLIC BLOOD PRESSURE: 83 MMHG | HEART RATE: 61 BPM | HEIGHT: 66 IN | SYSTOLIC BLOOD PRESSURE: 117 MMHG

## 2020-08-25 PROCEDURE — 99213 OFFICE O/P EST LOW 20 MIN: CPT | Mod: GC

## 2020-08-25 RX ORDER — MULTIVIT-MIN/FOLIC/VIT K/LYCOP 400-300MCG
25 MCG TABLET ORAL DAILY
Qty: 30 | Refills: 6 | Status: DISCONTINUED | COMMUNITY
Start: 2019-11-26 | End: 2020-08-25

## 2020-08-25 RX ORDER — PANTOPRAZOLE 40 MG/1
40 TABLET, DELAYED RELEASE ORAL DAILY
Qty: 30 | Refills: 1 | Status: DISCONTINUED | COMMUNITY
Start: 2020-08-21 | End: 2020-08-25

## 2020-08-25 RX ORDER — POLYETHYLENE GLYCOL 3350 AND ELECTROLYTES WITH LEMON FLAVOR 236; 22.74; 6.74; 5.86; 2.97 G/4L; G/4L; G/4L; G/4L; G/4L
236 POWDER, FOR SOLUTION ORAL
Qty: 1 | Refills: 0 | Status: DISCONTINUED | COMMUNITY
Start: 2020-07-24 | End: 2020-08-25

## 2020-08-25 RX ORDER — CAMPHOR AND MENTHOL 5; 5 MG/ML; MG/ML
0.5-0.5 LOTION TOPICAL TWICE DAILY
Qty: 1 | Refills: 0 | Status: DISCONTINUED | COMMUNITY
Start: 2020-02-11 | End: 2020-08-25

## 2020-08-26 DIAGNOSIS — R10.13 EPIGASTRIC PAIN: ICD-10-CM

## 2020-08-26 DIAGNOSIS — Z00.00 ENCOUNTER FOR GENERAL ADULT MEDICAL EXAMINATION WITHOUT ABNORMAL FINDINGS: ICD-10-CM

## 2020-08-26 DIAGNOSIS — K21.9 GASTRO-ESOPHAGEAL REFLUX DISEASE WITHOUT ESOPHAGITIS: ICD-10-CM

## 2020-08-26 DIAGNOSIS — E78.5 HYPERLIPIDEMIA, UNSPECIFIED: ICD-10-CM

## 2020-09-01 ENCOUNTER — OUTPATIENT (OUTPATIENT)
Dept: OUTPATIENT SERVICES | Facility: HOSPITAL | Age: 35
LOS: 1 days | Discharge: HOME | End: 2020-09-01

## 2020-09-01 ENCOUNTER — APPOINTMENT (OUTPATIENT)
Dept: DERMATOLOGY | Facility: CLINIC | Age: 35
End: 2020-09-01
Payer: COMMERCIAL

## 2020-09-01 VITALS
HEART RATE: 80 BPM | HEIGHT: 66 IN | BODY MASS INDEX: 26.2 KG/M2 | DIASTOLIC BLOOD PRESSURE: 81 MMHG | SYSTOLIC BLOOD PRESSURE: 129 MMHG | WEIGHT: 163 LBS

## 2020-09-01 LAB
ALBUMIN SERPL ELPH-MCNC: 4.7 G/DL
ALP BLD-CCNC: 66 U/L
ALT SERPL-CCNC: 39 U/L
ANION GAP SERPL CALC-SCNC: 13 MMOL/L
AST SERPL-CCNC: 26 U/L
BILIRUB SERPL-MCNC: 0.5 MG/DL
BUN SERPL-MCNC: 11 MG/DL
CALCIUM SERPL-MCNC: 9.7 MG/DL
CHLORIDE SERPL-SCNC: 102 MMOL/L
CO2 SERPL-SCNC: 26 MMOL/L
CREAT SERPL-MCNC: 0.9 MG/DL
GLUCOSE SERPL-MCNC: 83 MG/DL
POTASSIUM SERPL-SCNC: 5 MMOL/L
PROT SERPL-MCNC: 7.1 G/DL
SODIUM SERPL-SCNC: 141 MMOL/L

## 2020-09-01 PROCEDURE — 99212 OFFICE O/P EST SF 10 MIN: CPT | Mod: GC

## 2020-09-01 NOTE — ASSESSMENT
[FreeTextEntry1] : 33 y/o M with PMH of vitiligo, chronic itchy rash, acne vulgaris last seen in derm clinic Nov 19, 2019 presents for follow up on biopsy results. \par \par #nummular eczema \par - biopsy reveals signs consistent with eczema (parakeratosis, spongiosis, perivascular lymphocytic infiltrate)\par - pt reports continued pruritus with new lesions \par - f/u with allergist completed and reports negative results\par - completed course of PO steroids\par - Start on benzoyl peroxide + erythromycin cream\par - RTC in 3 months\par \par #Tinea unguium/pedis\par - + toe nail discoloration and thickening\par - start on ketoconazole topical \par - will start on oral antifingal once CMP results are back\par - RTC in 3 months/prn

## 2020-09-01 NOTE — REVIEW OF SYSTEMS
[Itching] : itching [Nail Changes] : nail changes [Skin Rash] : skin rash [Negative] : Musculoskeletal [Fever] : no fever [Chills] : no chills [Night Sweats] : no night sweats [Hair Changes] : no hair changes [Fatigue] : no fatigue [de-identified] : +pruritis, +nummular eczema

## 2020-09-01 NOTE — END OF VISIT
[FreeTextEntry3] : Topical treatment for tinea pedis.\par If LFT are normal, will prescribe oral terbinafine for nails.\par \par Topical treatment for acne.\par \par His itching is undiagnosed as to etiology, but his rashes are 95% improved from last year, no additional treatment at present

## 2020-09-01 NOTE — PHYSICAL EXAM
[Alert] : alert [Oriented x 3] : ~L oriented x 3 [Well Nourished] : well nourished [Conjunctiva Non-injected] : conjunctiva non-injected [No Edema] : no edema [Neck] : Neck [Chest] : Chest [Abdomen] : Abdomen [FreeTextEntry3] : +nummular eczema \par + Tinea unguium \par + pustular acne vulgaris

## 2020-09-01 NOTE — HISTORY OF PRESENT ILLNESS
[FreeTextEntry1] : Here for a follow up visit  [de-identified] : 33 y/o M with PMH of vitiligo, chronic itchy rash, acne vulgaris last seen in derm clinic 02/11/2020 presents for follow up for nummular eczema and acne vulgaris\par \par  Patient was referred for allergy testing which has been completed and reports negative findings. Says eczema is still itchy - new rashes around waist and bilateral neck by his beard. Also reports of yellow discoloration and thickening of toe nails. Reports trying multiple medications including antihistamines which resulted in minimal improvement

## 2020-09-08 DIAGNOSIS — L70.0 ACNE VULGARIS: ICD-10-CM

## 2020-09-08 DIAGNOSIS — L30.0 NUMMULAR DERMATITIS: ICD-10-CM

## 2020-09-08 DIAGNOSIS — B35.1 TINEA UNGUIUM: ICD-10-CM

## 2020-09-15 NOTE — ASSESSMENT
[FreeTextEntry1] : 35 year old M PMH  GERD, H. pylori eradication, nummular eczema, tubular adenoma and hepatic steatosis presenting for a followup appt\par \par #HCM\par -routine labs with LFT's\par -A1c\par -not due for cancer screening this visit\par \par #Eczema\par -eczema testing, following with derm \par -Patient has undergone allergy w/u \par - hydrocortisone 0.1% BID PRN\par \par #Hepatic steatosis\par -following with GI\par -repeat routine labs\par \par #GERD recurrence + chronic abd pain\par -s/p h/pylori eradication\par -not on protonix any longer\par -f/u pain mgmt (pt already has appt)\par -trial of duloxetine 30mg QHS\par \par RTC: follow up in 6 month. \par

## 2020-09-15 NOTE — REVIEW OF SYSTEMS
[Itching] : itching [Hoarseness] : hoarseness [Abdominal Pain] : abdominal pain [Heartburn] : heartburn [Skin Rash] : skin rash [Headache] : headache [Fever] : no fever [Chills] : no chills [Chest Pain] : no chest pain [Palpitations] : no palpitations [Shortness Of Breath] : no shortness of breath [Wheezing] : no wheezing [Dysuria] : no dysuria [Melena] : no melena [Insomnia] : no insomnia [Joint Pain] : no joint pain

## 2020-09-15 NOTE — PHYSICAL EXAM
[No Acute Distress] : no acute distress [EOMI] : extraocular movements intact [Normal TMs] : both tympanic membranes were normal [No JVD] : no jugular venous distention [Supple] : supple [No Respiratory Distress] : no respiratory distress  [No Accessory Muscle Use] : no accessory muscle use [Regular Rhythm] : with a regular rhythm [Normal S1, S2] : normal S1 and S2 [No Edema] : there was no peripheral edema [Soft] : abdomen soft [Non Tender] : non-tender [Non-distended] : non-distended [No HSM] : no HSM [No Joint Swelling] : no joint swelling [Normal Gait] : normal gait [Normal Affect] : the affect was normal [de-identified] : waxy dry lesion on face, near chin
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
## 2020-10-21 ENCOUNTER — APPOINTMENT (OUTPATIENT)
Dept: PAIN MANAGEMENT | Facility: CLINIC | Age: 35
End: 2020-10-21

## 2021-02-16 ENCOUNTER — APPOINTMENT (OUTPATIENT)
Dept: DERMATOLOGY | Facility: CLINIC | Age: 36
End: 2021-02-16
Payer: COMMERCIAL

## 2021-02-16 ENCOUNTER — OUTPATIENT (OUTPATIENT)
Dept: OUTPATIENT SERVICES | Facility: HOSPITAL | Age: 36
LOS: 1 days | Discharge: HOME | End: 2021-02-16

## 2021-02-16 DIAGNOSIS — L80 VITILIGO: ICD-10-CM

## 2021-02-16 DIAGNOSIS — L30.0 NUMMULAR DERMATITIS: ICD-10-CM

## 2021-02-16 DIAGNOSIS — D22.9 MELANOCYTIC NEVI, UNSPECIFIED: ICD-10-CM

## 2021-02-16 DIAGNOSIS — L70.0 ACNE VULGARIS: ICD-10-CM

## 2021-02-16 DIAGNOSIS — B35.1 TINEA UNGUIUM: ICD-10-CM

## 2021-02-16 PROCEDURE — 99214 OFFICE O/P EST MOD 30 MIN: CPT | Mod: GC

## 2021-02-16 NOTE — HISTORY OF PRESENT ILLNESS
[FreeTextEntry1] : follow up  [de-identified] : 36 y/o M with PMH of vitiligo, chronic itchy rash, acne vulgaris last seen in derm clinic 02/11/2020 presents for follow up for nummular eczema and acne vulgaris\par \par Patient was referred for allergy testing which has been completed and reports negative findings. Says eczema is still itchy -\par he reports same rash that comes and goes w/o any trigger. still itchy\par is using triamcinolone cream all over his body with minimal improvement\par no other new skin lesions\par

## 2021-02-16 NOTE — ASSESSMENT
[FreeTextEntry1] : 36 y/o M with PMH of chronic itchy rash, nummular eczema last seen in derm clinic sep 2020 presents for follow up\par \par #nummular eczema \par - biopsy reveals signs consistent with eczema (parakeratosis, spongiosis, perivascular lymphocytic infiltrate)\par - pt reports continued pruritus with some new skin lesions on his back \par - f/u with allergist completed and reports negative results\par - using triamcinolone cream on his skin lesions \par - will prescribe loratidine for 3 months and will assess the response after that\par - will renew his benzoyl peroxide / erythromycin gel \par - RTC in 3 months\par \par

## 2021-02-16 NOTE — END OF VISIT
[] : Resident [FreeTextEntry3] : Occasional acne lesion, patient more concerned with post inflammatory hyperpigmentation, which is minor.\par Episodes of itch and scattered areas of eczema.\par Worst at waist line.\par continue topical steroid,and try antihistamine.\par May need patch tests

## 2021-03-19 ENCOUNTER — APPOINTMENT (OUTPATIENT)
Dept: GASTROENTEROLOGY | Facility: CLINIC | Age: 36
End: 2021-03-19
Payer: COMMERCIAL

## 2021-03-19 ENCOUNTER — OUTPATIENT (OUTPATIENT)
Dept: OUTPATIENT SERVICES | Facility: HOSPITAL | Age: 36
LOS: 1 days | Discharge: HOME | End: 2021-03-19

## 2021-03-19 VITALS
BODY MASS INDEX: 26.52 KG/M2 | HEIGHT: 66 IN | TEMPERATURE: 98.4 F | DIASTOLIC BLOOD PRESSURE: 69 MMHG | WEIGHT: 165 LBS | SYSTOLIC BLOOD PRESSURE: 102 MMHG | OXYGEN SATURATION: 98 % | HEART RATE: 61 BPM

## 2021-03-19 PROCEDURE — 99214 OFFICE O/P EST MOD 30 MIN: CPT | Mod: GC

## 2021-03-19 NOTE — PHYSICAL EXAM
[Soft] : abdomen soft [Non Tender] : non-tender [Non-distended] : non-distended [No Masses] : no abdominal mass palpated [No HSM] : no HSM [Normal Bowel Sounds] : normal bowel sounds [No Hernias] : no hernias [Normal] : soft, non-tender, non-distended, no masses palpated, no HSM and normal bowel sounds [General Appearance - Alert] : alert [Bowel Sounds] : normal bowel sounds [Abdomen Soft] : soft [Abdomen Tenderness] : non-tender [] : no hepato-splenomegaly [Abdomen Mass (___ Cm)] : no abdominal mass palpated [Abdomen Hernia] : no hernia was discovered [FreeTextEntry1] : Anal skin tag

## 2021-03-19 NOTE — END OF VISIT
[] : Resident [FreeTextEntry3] : pt with reported LUQ bulging that he feels is worsening - EGD, colon, CT all unrevealing. will refer to surgery for review of CT and exam. pt also reports anal bulge, will refer to Dr. Sprague for anoscopy and eval

## 2021-03-19 NOTE — HISTORY OF PRESENT ILLNESS
[Heartburn] : denies heartburn [Nausea] : denies nausea [Vomiting] : denies vomiting [Diarrhea] : denies diarrhea [Constipation] : denies constipation [Abdominal Pain] : stable abdominal pain [Abdominal Swelling] : abdominal swelling stable [GERD] : gastroesophageal reflux disease [de-identified] : The patient is a 35 year-old male with a PMH of GERD , H. pylori s/p eradication came for follow up visit. Patient was seen on July 2020 for LUQ abdominal pain and underwent colonoscopy which showed Right side diverticula and a polyp. Patient still complains of LUQ and epigastric swelling and abdominal pain worsening with bending forward. He also complains of fatigue. He also states occasional heart burn related to spicy food, improved after having heathy food as per patient. Responded to PPI.\par \par EGD: 5/13/19: Gastritis , h. pylori + eradication confirmed on July 2019\par Colonoscopy 8/3/20: Scattered right side diverticulosis, 5mm descending colon tubular adenoma\par CT scan: 5/2020: hepatic steatosis otherwise unremarkable. \par CT scan 2019, diverticulosis [de-identified] : Bulging sensation at the anus

## 2021-03-19 NOTE — ASSESSMENT
[FreeTextEntry1] : The patient is a 35 year-old male with a PMH of GERD , H. pylori s/p eradication came for follow up visit of abdominal pain and swelling.\par \par #LUQ and epigastric pain- likely musculoskeletal\par - Patient states there is swelling on LUQ but was not seen on physical exam\par - Pain worse with position changes\par - Patient had EGD, colonoscopy and CT scan x2 which did not identify an etiology that can be explained the pain related to GI system. \par - Assurance, Refer to general surgery Dr samaniego\par \par #Feeling of a bulging mass in anus while defecation\par -No evidence of anemia\par -No alarming symptoms, like weight loss, melena, BRBPR\par -Likely skin tag / hemorrhoids\par -refer to colorectal surgery Dr Sprague for anoscopy\par \par #GERD\par - Improved on life style and dietary modification\par \par \par . \par

## 2021-03-19 NOTE — HISTORY OF PRESENT ILLNESS
[Heartburn] : denies heartburn [Nausea] : denies nausea [Vomiting] : denies vomiting [Diarrhea] : denies diarrhea [Constipation] : denies constipation [Abdominal Pain] : stable abdominal pain [Abdominal Swelling] : abdominal swelling stable [GERD] : gastroesophageal reflux disease [de-identified] : The patient is a 35 year-old male with a PMH of GERD , H. pylori s/p eradication came for follow up visit. Patient was seen on July 2020 for LUQ abdominal pain and underwent colonoscopy which showed Right side diverticula and a polyp. Patient still complains of LUQ and epigastric swelling and abdominal pain worsening with bending forward. He also complains of fatigue. He also states occasional heart burn related to spicy food, improved after having heathy food as per patient. Responded to PPI.\par \par EGD: 5/13/19: Gastritis , h. pylori + eradication confirmed on July 2019\par Colonoscopy 8/3/20: Scattered right side diverticulosis, 5mm descending colon tubular adenoma\par CT scan: 5/2020: hepatic steatosis otherwise unremarkable. \par CT scan 2019, diverticulosis [de-identified] : Bulging sensation at the anus

## 2021-04-09 ENCOUNTER — APPOINTMENT (OUTPATIENT)
Dept: SURGERY | Facility: CLINIC | Age: 36
End: 2021-04-09
Payer: SUBSIDIZED

## 2021-04-09 VITALS
DIASTOLIC BLOOD PRESSURE: 78 MMHG | HEART RATE: 68 BPM | WEIGHT: 167 LBS | HEIGHT: 66 IN | SYSTOLIC BLOOD PRESSURE: 104 MMHG | BODY MASS INDEX: 26.84 KG/M2 | TEMPERATURE: 97.2 F

## 2021-04-09 PROCEDURE — 99203 OFFICE O/P NEW LOW 30 MIN: CPT

## 2021-04-09 PROCEDURE — 99072 ADDL SUPL MATRL&STAF TM PHE: CPT

## 2021-04-13 NOTE — ASSESSMENT
[FreeTextEntry1] : Odell has been suffering for over a 1-2 years. \par He has left upper quadrant pain . Related to movement \par not related to food.  more than 1-2 year. \par He has egd / colonoscopy \par 2 ct scan . \par He does not need pain medications. \par He has seen gi and not going to colorectal surgery\par \par impression : abodminal pain \par cause?\par \par Will get some labs including hpylor , crp , amylase\par nashfibro\par \par We explained in great detail the pathophysiology of the disease process. We celio diagrams and discussed the workup for diagnosis and management.\par The various options were explained to the patient. The Risk , benefit and alternatives were discussed. We discussed recovery and possible complications.\par \par We counselled and reassured him about pain and its management. \par \par We discussed the importance of close follow up. \par We informed that he needs to follow up in after investigations .\par We also informed that he can call us if anything changes or has any questions.

## 2021-04-13 NOTE — HISTORY OF PRESENT ILLNESS
[de-identified] : Odell has been suffering for over a 1-2 years. \par He has left upper quadrant pain . Related to movement \par not related to food.  more than 1-2 year. \par He has egd / colonoscopy \par 2 ct scan . \par He does not need pain medications. \par He has seen gi and not going to colorectal surgery

## 2021-04-13 NOTE — PHYSICAL EXAM
[JVD] : no jugular venous distention  [Respiratory Effort] : normal respiratory effort [Purpura] : no purpura  [Alert] : alert [Calm] : calm [de-identified] : Normal  [de-identified] : Normal  [de-identified] : Normal  [de-identified] : Normal

## 2021-04-27 ENCOUNTER — APPOINTMENT (OUTPATIENT)
Dept: DERMATOLOGY | Facility: CLINIC | Age: 36
End: 2021-04-27

## 2021-04-30 LAB
ALBUMIN SERPL ELPH-MCNC: 5 G/DL
ALP BLD-CCNC: 76 U/L
ALT SERPL-CCNC: 70 U/L
AMYLASE/CREAT SERPL: 92 U/L
AST SERPL W P-5'-P-CCNC: 39 IU/L
AST SERPL-CCNC: 35 U/L
BILIRUB DIRECT SERPL-MCNC: <0.2 MG/DL
BILIRUB INDIRECT SERPL-MCNC: >0.4 MG/DL
BILIRUB SERPL-MCNC: 0.6 MG/DL
CHOLEST SERPL-MCNC: 192 MG/DL
COMMENT:: NORMAL
CRP SERPL-MCNC: <3 MG/L
FIBROSIS STAGE SERPL QL: NORMAL
FIBROSURE ALPHA 2 MACROGLOBULINS: 114 MG/DL
FIBROSURE ALT (SGPT): 70 IU/L
FIBROSURE APOLIPOPROTEIN A1: 122 MG/DL
FIBROSURE GGT: 37 IU/L
FIBROSURE HAPTOGLOBIN: 136 MG/DL
FIBROSURE SCORING: NORMAL
FIBROSURE TOTAL BILIRUBIN: 0.5 MG/DL
GLUCOSE SERPL-MCNC: 85 MG/DL
H PYLORI AG STL QL: NOT DETECTED
INTERPRETATIONS:: NORMAL
LIVER FIBR SCORE SERPL CALC.FIBROSURE: 0.09
NASH SCORING: NORMAL
NECROINFLAMMATORY ACT GRADE SERPL QL: NORMAL
NECROINFLAMMATORY ACT SCORE SERPL: 0.5 (ref 0.25–?)
PROT SERPL-MCNC: 8.1 G/DL
SERVICE CMNT-IMP: NORMAL
STEATOSIS GRADE: NORMAL
STEATOSIS GRADING: NORMAL
STEATOSIS SCORE: 0.61
TRIGL SERPL-MCNC: 132 MG/DL

## 2021-05-03 ENCOUNTER — APPOINTMENT (OUTPATIENT)
Dept: SURGERY | Facility: CLINIC | Age: 36
End: 2021-05-03
Payer: SUBSIDIZED

## 2021-05-03 VITALS
TEMPERATURE: 97.2 F | WEIGHT: 164 LBS | HEART RATE: 71 BPM | BODY MASS INDEX: 26.36 KG/M2 | SYSTOLIC BLOOD PRESSURE: 110 MMHG | DIASTOLIC BLOOD PRESSURE: 75 MMHG | HEIGHT: 66 IN

## 2021-05-03 DIAGNOSIS — K64.4 RESIDUAL HEMORRHOIDAL SKIN TAGS: ICD-10-CM

## 2021-05-03 PROCEDURE — 46600 DIAGNOSTIC ANOSCOPY SPX: CPT

## 2021-05-03 PROCEDURE — 99213 OFFICE O/P EST LOW 20 MIN: CPT | Mod: 25

## 2021-05-03 PROCEDURE — 99072 ADDL SUPL MATRL&STAF TM PHE: CPT

## 2021-05-03 NOTE — PHYSICAL EXAM
[Respiratory Effort] : normal respiratory effort [Alert] : alert [Calm] : calm [JVD] : no jugular venous distention  [Purpura] : no purpura  [de-identified] : Normal  [de-identified] : Normal  [de-identified] : Normal  [de-identified] : Normal

## 2021-05-03 NOTE — ASSESSMENT
[FreeTextEntry1] : Odell has been suffering for over a 1-2 years. \par He has left upper quadrant pain . Related to movement \par not related to food.  more than 1-2 year. \par He has egd / colonoscopy \par 2 ct scan . \par He does not need pain medications. \par He has seen gi and not going to colorectal surgery\par 5/3: his blood work is normal , His RAY score is 0.5. ALT is 70s\par He still has pain . \par He wants another CT scan. \par \par impression : abodminal pain \par cause?\par \par will get ct \par and he needs to see his gi \par \par We explained in great detail the pathophysiology of the disease process. We celio diagrams and discussed the workup for diagnosis and management.\par The various options were explained to the patient. The Risk , benefit and alternatives were discussed. We discussed recovery and possible complications.\par \par We counselled and reassured him about pain and its management. \par \par We discussed the importance of close follow up. \par We informed that he needs to follow up in after investigations .\par We also informed that he can call us if anything changes or has any questions.

## 2021-05-03 NOTE — HISTORY OF PRESENT ILLNESS
[de-identified] : Odell has been suffering for over a 1-2 years. \par He has left upper quadrant pain . Related to movement \par not related to food.  more than 1-2 year. \par He has egd / colonoscopy \par 2 ct scan . \par He does not need pain medications. \par He has seen gi and not going to colorectal surgery\par 5/3: his blood work is normal , His RAY score is 0.5. ALT is 70s\par He still has pain . \par He wants another CT scan. \par

## 2021-05-06 PROBLEM — K64.4 RESIDUAL HEMORRHOIDAL SKIN TAGS: Status: ACTIVE | Noted: 2021-05-06

## 2021-05-06 NOTE — PROCEDURE
[FreeTextEntry1] : LINUS and anoscopy show normal sphincter tone, normal internal hemorrhoids and no masses.\par

## 2021-05-06 NOTE — PHYSICAL EXAM
[Abdomen Masses] : No abdominal masses [Abdomen Tenderness] : ~T No ~M abdominal tenderness [No HSM] : no hepatosplenomegaly [Excoriation] : no perianal excoriation [Fistula] : no fistulas [Wart] : no warts [Ulcer ___ cm] : no ulcers [Pilonidal Cyst] : no pilonidal cysts [Tender, Swollen] : nontender, non-swollen [Thrombosed] : that was not thrombosed [Skin Tags] : residual hemorrhoidal skin tags were noted [Normal] : was normal [None] : there was no rectal mass  [Respiratory Effort] : normal respiratory effort [Normal Rate and Rhythm] : normal rate and rhythm [de-identified] : external examination shows a small skin tag in the anterior location [de-identified] : awake, alert and in no acute distress

## 2021-05-06 NOTE — HISTORY OF PRESENT ILLNESS
[FreeTextEntry1] : Patient is a 35M who presents for evaluation of hemorrhoids.  The patient states he had perianal pain and swelling in 12/2020.  This resolved but now he feels a lump in the area. He has no other symptoms. Patient denies fevers, chills, nausea, vomiting, abdominal pain, constipation, diarrhea, blood in his stool or unexpected weight loss.  Patient denies a family history of colon cancer rectal cancer or inflammatory bowel disease. His last colonoscopy was in 8/2020 that showed a TA.

## 2021-05-18 NOTE — ED PROVIDER NOTE - CLINICAL SUMMARY MEDICAL DECISION MAKING FREE TEXT BOX
I have fully discussed the medical management and delivery of care with the patient. I have discussed any available labs, imaging and treatment options with the patient. Patient confirms understanding and has been given detailed return precautions. Patient instructed to return to the ED should symptoms persist or worsen. Patient has demonstrated capacity and has verbalized understanding. Patient is well appearing upon discharge.
Presenting Symptoms

## 2021-05-19 ENCOUNTER — APPOINTMENT (OUTPATIENT)
Dept: INTERNAL MEDICINE | Facility: CLINIC | Age: 36
End: 2021-05-19
Payer: COMMERCIAL

## 2021-05-19 ENCOUNTER — OUTPATIENT (OUTPATIENT)
Dept: OUTPATIENT SERVICES | Facility: HOSPITAL | Age: 36
LOS: 1 days | Discharge: HOME | End: 2021-05-19

## 2021-05-19 VITALS
OXYGEN SATURATION: 100 % | HEIGHT: 66 IN | TEMPERATURE: 97.5 F | WEIGHT: 164 LBS | BODY MASS INDEX: 26.36 KG/M2 | DIASTOLIC BLOOD PRESSURE: 71 MMHG | SYSTOLIC BLOOD PRESSURE: 107 MMHG | HEART RATE: 60 BPM

## 2021-05-19 DIAGNOSIS — Z86.16 PERSONAL HISTORY OF COVID-19: ICD-10-CM

## 2021-05-19 DIAGNOSIS — K76.0 FATTY (CHANGE OF) LIVER, NOT ELSEWHERE CLASSIFIED: ICD-10-CM

## 2021-05-19 DIAGNOSIS — Z00.00 ENCOUNTER FOR GENERAL ADULT MEDICAL EXAMINATION WITHOUT ABNORMAL FINDINGS: ICD-10-CM

## 2021-05-19 DIAGNOSIS — K21.9 GASTRO-ESOPHAGEAL REFLUX DISEASE WITHOUT ESOPHAGITIS: ICD-10-CM

## 2021-05-19 DIAGNOSIS — L30.0 NUMMULAR DERMATITIS: ICD-10-CM

## 2021-05-19 DIAGNOSIS — R10.9 UNSPECIFIED ABDOMINAL PAIN: ICD-10-CM

## 2021-05-19 PROCEDURE — 99214 OFFICE O/P EST MOD 30 MIN: CPT | Mod: GC

## 2021-05-19 RX ORDER — TERBINAFINE HYDROCHLORIDE 250 MG/1
250 TABLET ORAL DAILY
Qty: 30 | Refills: 2 | Status: COMPLETED | COMMUNITY
Start: 2020-09-01 | End: 2021-05-19

## 2021-05-19 RX ORDER — LORATADINE 10 MG/1
10 TABLET ORAL
Qty: 30 | Refills: 1 | Status: COMPLETED | COMMUNITY
Start: 2021-02-16 | End: 2021-05-19

## 2021-05-19 RX ORDER — HYDROCORTISONE BUTYRATE 1 MG/G
0.1 CREAM TOPICAL TWICE DAILY
Qty: 1 | Refills: 0 | Status: COMPLETED | COMMUNITY
Start: 2020-08-25 | End: 2021-05-19

## 2021-05-19 RX ORDER — DULOXETINE HYDROCHLORIDE 30 MG/1
30 CAPSULE, DELAYED RELEASE PELLETS ORAL
Qty: 30 | Refills: 0 | Status: COMPLETED | COMMUNITY
Start: 2020-08-25 | End: 2021-05-19

## 2021-05-19 RX ORDER — KETOCONAZOLE 20 MG/G
2 CREAM TOPICAL TWICE DAILY
Qty: 1 | Refills: 0 | Status: COMPLETED | COMMUNITY
Start: 2020-09-01 | End: 2021-05-19

## 2021-05-26 ENCOUNTER — RESULT REVIEW (OUTPATIENT)
Age: 36
End: 2021-05-26

## 2021-05-26 ENCOUNTER — OUTPATIENT (OUTPATIENT)
Dept: OUTPATIENT SERVICES | Facility: HOSPITAL | Age: 36
LOS: 1 days | Discharge: HOME | End: 2021-05-26
Payer: SUBSIDIZED

## 2021-05-26 DIAGNOSIS — R10.9 UNSPECIFIED ABDOMINAL PAIN: ICD-10-CM

## 2021-05-26 PROCEDURE — 74177 CT ABD & PELVIS W/CONTRAST: CPT | Mod: 26

## 2021-05-31 NOTE — HISTORY OF PRESENT ILLNESS
[FreeTextEntry1] : Follow up on abdominal pain [de-identified] : 35 year-old male with PMHx of nummular eczema, GERD, H. pylori gastritis s/p eradication, chronic LUQ pain, epigastric swelling, asymptomatic COVID-19 infection, presents for follow up visit.\par \par He still complains of constant chronic LUQ pressure-liked pain with occasional burning/electric sensation, 4/10 at this moment but can become 8/10, worse after eating or movement, associated with self-reported swelling. The swelling has been getting worse. He sometimes tried taking ibuprofen and it helped. Patient was seen by Dr. Mazariegos for evaluation of chronic LUQ pain, scheduled to have another CT with PO and IV contrast on 5/29/21. \par \par Patient had asymptomatic COVID-19 infection in April, he did not need hospitalization. He self-isolated himself and recovered. \par \par Denies fever/chills, cough, chest pain, shortness of breath, n/v/d/c, or dysuria. \par \par

## 2021-05-31 NOTE — REVIEW OF SYSTEMS
[Abdominal Pain] : abdominal pain [Heartburn] : heartburn [Muscle Pain] : muscle pain [Fever] : no fever [Chills] : no chills [Fatigue] : no fatigue [Pain] : no pain [Sore Throat] : no sore throat [Hoarseness] : no hoarseness [Chest Pain] : no chest pain [Palpitations] : no palpitations [Lower Ext Edema] : no lower extremity edema [Orthopena] : no orthopnea [Shortness Of Breath] : no shortness of breath [Wheezing] : no wheezing [Cough] : no cough [Dyspnea on Exertion] : not dyspnea on exertion [Nausea] : no nausea [Constipation] : no constipation [Diarrhea] : no diarrhea [Vomiting] : no vomiting [Melena] : no melena [Dysuria] : no dysuria [Hematuria] : no hematuria [Frequency] : no frequency [Joint Pain] : no joint pain [Joint Stiffness] : no joint stiffness [Muscle Weakness] : no muscle weakness [Back Pain] : no back pain [Joint Swelling] : no joint swelling [Itching] : no itching [Skin Rash] : no skin rash [Headache] : no headache [Dizziness] : no dizziness

## 2021-05-31 NOTE — ASSESSMENT
[FreeTextEntry1] : 35 year-old male PMH of nummular eczema, GERD, H. pylori gastritis s/p eradication, chronic LUQ pain, epigastric swelling, asymptomatic COVID-19 infection, presents for follow up visit.\par \par # Chronic LUQ pain\par - Unclear etiology, but most likely musculoskeletal or neurological, will rule out gastritis first\par - CT scan in 2019 showed diverticulosis; CT in 2020 showed hepatic steatosis\par - EGD 5/13/19: H pylori gastritis; eradicated confirmed in July 2019\par - Colonoscopy 8/3/2020: scattered right sided diverticulosis, 5mm descending colon tubular adenoma\par - Tried duloxetine and it did not help\par - Can dry gabapentin 100mg q8hrs\par - Follow up repeat CT scan result\par - If trial of protonix/simethacome does not help, will refer to pain management for possible regional pain syndrome\par \par # Asymptomatic COVID-19 infection\par - Self-resolved; did not receive monoclonal antibodies\par - No shortness of breath, fatigue\par \par # Eczema - much improved\par - Follow up by Derm\par - Currently not using any topical cream\par \par # Hepatic steatosis\par - RAY fiborsure 4/30/21: + RAY; -fibrosis\par - Following with GI\par - Repeat CBC, CMP, TSH, lipid panel, A1C, and vitamin D\par \par # GERD recurrence + chronic abd pain\par - S/p h/pylori eradication; repeat HP stool antigen negative 4/30/21; \par - Try Protonix 40mg q24hrs and simethacome 80mg q6hrs\par \par # Hemorrhoid\par - Evaluated by colorectal surgeon; s/p anoscopy, found to have skin tag\par - Follow up with GI\par \par \par HCM:\par - Encourage patient getting the COVID vaccine\par - RTC: follow up in 3 months or PRN

## 2021-05-31 NOTE — PHYSICAL EXAM
[No Acute Distress] : no acute distress [Well Nourished] : well nourished [Well Developed] : well developed [Well-Appearing] : well-appearing [Normal Sclera/Conjunctiva] : normal sclera/conjunctiva [Normal Outer Ear/Nose] : the outer ears and nose were normal in appearance [No JVD] : no jugular venous distention [No Lymphadenopathy] : no lymphadenopathy [Supple] : supple [Thyroid Normal, No Nodules] : the thyroid was normal and there were no nodules present [No Respiratory Distress] : no respiratory distress  [No Accessory Muscle Use] : no accessory muscle use [Clear to Auscultation] : lungs were clear to auscultation bilaterally [Normal Rate] : normal rate  [Regular Rhythm] : with a regular rhythm [Normal S1, S2] : normal S1 and S2 [No Murmur] : no murmur heard [No Edema] : there was no peripheral edema [Soft] : abdomen soft [Non-distended] : non-distended [No Masses] : no abdominal mass palpated [No HSM] : no HSM [Normal Bowel Sounds] : normal bowel sounds [No CVA Tenderness] : no CVA  tenderness [No Spinal Tenderness] : no spinal tenderness [No Joint Swelling] : no joint swelling [Grossly Normal Strength/Tone] : grossly normal strength/tone [No Rash] : no rash [No Focal Deficits] : no focal deficits [Normal Gait] : normal gait [Normal Affect] : the affect was normal [Alert and Oriented x3] : oriented to person, place, and time [Normal Mood] : the mood was normal [Normal Insight/Judgement] : insight and judgment were intact [de-identified] : Tenderness on palpation at LUQ near costochondral area

## 2021-06-11 ENCOUNTER — EMERGENCY (EMERGENCY)
Facility: HOSPITAL | Age: 36
LOS: 0 days | Discharge: HOME | End: 2021-06-12
Attending: EMERGENCY MEDICINE | Admitting: EMERGENCY MEDICINE
Payer: SUBSIDIZED

## 2021-06-11 VITALS
HEIGHT: 66 IN | DIASTOLIC BLOOD PRESSURE: 83 MMHG | TEMPERATURE: 99 F | OXYGEN SATURATION: 100 % | WEIGHT: 164.02 LBS | HEART RATE: 69 BPM | SYSTOLIC BLOOD PRESSURE: 123 MMHG | RESPIRATION RATE: 18 BRPM

## 2021-06-11 DIAGNOSIS — J02.9 ACUTE PHARYNGITIS, UNSPECIFIED: ICD-10-CM

## 2021-06-11 PROCEDURE — 99053 MED SERV 10PM-8AM 24 HR FAC: CPT

## 2021-06-11 PROCEDURE — 99285 EMERGENCY DEPT VISIT HI MDM: CPT

## 2021-06-12 VITALS
RESPIRATION RATE: 18 BRPM | OXYGEN SATURATION: 100 % | DIASTOLIC BLOOD PRESSURE: 81 MMHG | HEART RATE: 98 BPM | SYSTOLIC BLOOD PRESSURE: 128 MMHG

## 2021-06-12 LAB
ALBUMIN SERPL ELPH-MCNC: 4.5 G/DL — SIGNIFICANT CHANGE UP (ref 3.5–5.2)
ALP SERPL-CCNC: 78 U/L — SIGNIFICANT CHANGE UP (ref 30–115)
ALT FLD-CCNC: 46 U/L — HIGH (ref 0–41)
ANION GAP SERPL CALC-SCNC: 9 MMOL/L — SIGNIFICANT CHANGE UP (ref 7–14)
AST SERPL-CCNC: 26 U/L — SIGNIFICANT CHANGE UP (ref 0–41)
BASOPHILS # BLD AUTO: 0.04 K/UL — SIGNIFICANT CHANGE UP (ref 0–0.2)
BASOPHILS NFR BLD AUTO: 0.6 % — SIGNIFICANT CHANGE UP (ref 0–1)
BILIRUB SERPL-MCNC: 0.4 MG/DL — SIGNIFICANT CHANGE UP (ref 0.2–1.2)
BUN SERPL-MCNC: 16 MG/DL — SIGNIFICANT CHANGE UP (ref 10–20)
CALCIUM SERPL-MCNC: 9.4 MG/DL — SIGNIFICANT CHANGE UP (ref 8.5–10.1)
CHLORIDE SERPL-SCNC: 103 MMOL/L — SIGNIFICANT CHANGE UP (ref 98–110)
CO2 SERPL-SCNC: 26 MMOL/L — SIGNIFICANT CHANGE UP (ref 17–32)
CREAT SERPL-MCNC: 1 MG/DL — SIGNIFICANT CHANGE UP (ref 0.7–1.5)
EOSINOPHIL # BLD AUTO: 0.18 K/UL — SIGNIFICANT CHANGE UP (ref 0–0.7)
EOSINOPHIL NFR BLD AUTO: 2.6 % — SIGNIFICANT CHANGE UP (ref 0–8)
GLUCOSE SERPL-MCNC: 96 MG/DL — SIGNIFICANT CHANGE UP (ref 70–99)
HCT VFR BLD CALC: 43.7 % — SIGNIFICANT CHANGE UP (ref 42–52)
HGB BLD-MCNC: 14.4 G/DL — SIGNIFICANT CHANGE UP (ref 14–18)
IMM GRANULOCYTES NFR BLD AUTO: 0.3 % — SIGNIFICANT CHANGE UP (ref 0.1–0.3)
LYMPHOCYTES # BLD AUTO: 2.57 K/UL — SIGNIFICANT CHANGE UP (ref 1.2–3.4)
LYMPHOCYTES # BLD AUTO: 36.8 % — SIGNIFICANT CHANGE UP (ref 20.5–51.1)
MCHC RBC-ENTMCNC: 28.3 PG — SIGNIFICANT CHANGE UP (ref 27–31)
MCHC RBC-ENTMCNC: 33 G/DL — SIGNIFICANT CHANGE UP (ref 32–37)
MCV RBC AUTO: 85.9 FL — SIGNIFICANT CHANGE UP (ref 80–94)
MONOCYTES # BLD AUTO: 0.49 K/UL — SIGNIFICANT CHANGE UP (ref 0.1–0.6)
MONOCYTES NFR BLD AUTO: 7 % — SIGNIFICANT CHANGE UP (ref 1.7–9.3)
NEUTROPHILS # BLD AUTO: 3.68 K/UL — SIGNIFICANT CHANGE UP (ref 1.4–6.5)
NEUTROPHILS NFR BLD AUTO: 52.7 % — SIGNIFICANT CHANGE UP (ref 42.2–75.2)
NRBC # BLD: 0 /100 WBCS — SIGNIFICANT CHANGE UP (ref 0–0)
PLATELET # BLD AUTO: 216 K/UL — SIGNIFICANT CHANGE UP (ref 130–400)
POTASSIUM SERPL-MCNC: 4.2 MMOL/L — SIGNIFICANT CHANGE UP (ref 3.5–5)
POTASSIUM SERPL-SCNC: 4.2 MMOL/L — SIGNIFICANT CHANGE UP (ref 3.5–5)
PROT SERPL-MCNC: 6.9 G/DL — SIGNIFICANT CHANGE UP (ref 6–8)
RBC # BLD: 5.09 M/UL — SIGNIFICANT CHANGE UP (ref 4.7–6.1)
RBC # FLD: 13.2 % — SIGNIFICANT CHANGE UP (ref 11.5–14.5)
SODIUM SERPL-SCNC: 138 MMOL/L — SIGNIFICANT CHANGE UP (ref 135–146)
WBC # BLD: 6.98 K/UL — SIGNIFICANT CHANGE UP (ref 4.8–10.8)
WBC # FLD AUTO: 6.98 K/UL — SIGNIFICANT CHANGE UP (ref 4.8–10.8)

## 2021-06-12 PROCEDURE — 70360 X-RAY EXAM OF NECK: CPT | Mod: 26

## 2021-06-12 PROCEDURE — 70491 CT SOFT TISSUE NECK W/DYE: CPT | Mod: 26,MA

## 2021-06-12 RX ORDER — SODIUM CHLORIDE 9 MG/ML
1000 INJECTION, SOLUTION INTRAVENOUS ONCE
Refills: 0 | Status: COMPLETED | OUTPATIENT
Start: 2021-06-12 | End: 2021-06-12

## 2021-06-12 RX ORDER — DIPHENHYDRAMINE HYDROCHLORIDE AND LIDOCAINE HYDROCHLORIDE AND ALUMINUM HYDROXIDE AND MAGNESIUM HYDRO
30 KIT ONCE
Refills: 0 | Status: COMPLETED | OUTPATIENT
Start: 2021-06-12 | End: 2021-06-12

## 2021-06-12 RX ORDER — DIPHENHYDRAMINE HCL 50 MG
50 CAPSULE ORAL ONCE
Refills: 0 | Status: COMPLETED | OUTPATIENT
Start: 2021-06-12 | End: 2021-06-12

## 2021-06-12 RX ORDER — DEXAMETHASONE 0.5 MG/5ML
10 ELIXIR ORAL ONCE
Refills: 0 | Status: COMPLETED | OUTPATIENT
Start: 2021-06-12 | End: 2021-06-12

## 2021-06-12 RX ADMIN — SODIUM CHLORIDE 1000 MILLILITER(S): 9 INJECTION, SOLUTION INTRAVENOUS at 01:45

## 2021-06-12 RX ADMIN — Medication 10 MILLIGRAM(S): at 01:22

## 2021-06-12 RX ADMIN — DIPHENHYDRAMINE HYDROCHLORIDE AND LIDOCAINE HYDROCHLORIDE AND ALUMINUM HYDROXIDE AND MAGNESIUM HYDRO 30 MILLILITER(S): KIT at 01:35

## 2021-06-12 RX ADMIN — Medication 50 MILLIGRAM(S): at 02:25

## 2021-06-12 NOTE — ED PROVIDER NOTE - PATIENT PORTAL LINK FT
You can access the FollowMyHealth Patient Portal offered by [facility] by registering at the following website: [email or web address]. By joining [facility]’s FollowMyHealth portal, you will also be able to view your health information using other applications (apps) compatible with our system.

## 2021-06-12 NOTE — ED PROVIDER NOTE - PROGRESS NOTE DETAILS
ED Attending LOIS Davis  ent aware of pt and results, as discussed with pt, will come evaluate. ED Attending LOIS Davis  pt in no resp distress, speaking full sentences, saturation 100 on ra, no drooling/ secretions, ent aware of pt and results, as discussed with pt, will come evaluate. ED Attending LOIS Davis  Pt endorsed to Rhonda, please follow up ent, reassess. ED Attending LOIS Davis  Pt endorsed to Eloy , please follow up ent, reassess. AN: sign out received from Dr. Davis  Pt evaluated by me. Presented with FB sensation in throat. labs done, ct ordered and ENT consulted. Pending results and will dispo accordingly.

## 2021-06-12 NOTE — ED PROVIDER NOTE - CLINICAL SUMMARY MEDICAL DECISION MAKING FREE TEXT BOX
Pt presented with FB sensation in throat. REquired labs, meds, imaging and ent consult. No acute findings. Will dispo with outpt f/up.

## 2021-06-12 NOTE — ED PROVIDER NOTE - NS ED ROS FT
Eyes:  No visual changes, eye pain or discharge.  ENMT: +throat pain  Cardiac:  No chest pain, SOB or edema. No chest pain with exertion.  Respiratory:  No cough or respiratory distress. No hemoptysis. No history of asthma or RAD.  GI:  No nausea, vomiting, diarrhea or abdominal pain.  :  No dysuria, frequency or burning.  MS:  No myalgia, muscle weakness, joint pain or back pain.  Neuro:  No headache or weakness.  No LOC.  Skin:  No skin rash.   Endocrine: No history of thyroid disease or diabetes.

## 2021-06-12 NOTE — ED PROVIDER NOTE - ATTENDING CONTRIBUTION TO CARE
34 y/o m w/ no sig pmhx presents with sensation of "extra skin in his throat that makes it feel tight" x ~ 3 days, reports able to drink and eat solids but feels tightness in the middle of his throat, has not chocked on food ro gotten food stuck. worsened today where he swallows and feels the tightness in throat so came to ed. no food impaction, no fish bones. no sore throat. denies fever, chills, n/v, cp, sob, pleuritic chest pain, dental pain, palpitations, diaphoresis, cough, drooling/secretions, trismus, neck swelling, neck stiffness, muffled/change in voice, dysphagia, odonoyphagia, drainage, trauma, weakness, numbness/tingling, sick contacts, recent travel or rash.      on exam: non -toxic appearing pt sitting on stretcher speaking full sentences, no sniffing position, no muffled or hot potato voice, no rash, no facial swelling, mmm, no dental pain, with no surrounding fluctuance/induration, no halitosis, no trismus, no drooling/secretions, no pain to floor of mouth, no elevation to floor of mouth, no oropharygeal edema, uvula midline, no PTA, MMM, no erythema, exudates or petechiae. Uvula midline. No drooling/secretions, no strawberry tongue,  No PTA. No trismus. No malocclusion. No TMJ pain no neck swelling- supple, non-tender, no anterior neck pain, RRR, radial pulses 2/4 b/l, ctabl w/ breath sounds present b/l, no wheezing or crackles, good air exchange, good respiratory effort, no accessory muscle use, no tachypnea, no stridor,  AAOx3. CN II-XII intact, no facial droop or slurring of speech. No focal deficits.

## 2021-06-12 NOTE — ED PROVIDER NOTE - PHYSICAL EXAMINATION
CONSTITUTIONAL: Well-developed; well-nourished; in no acute distress.   SKIN: warm, dry  HEAD: Normocephalic; atraumatic.  EYES: PERRL, EOMI, no conjunctival erythema  ENT: No throat erythema/pus  NECK: Supple; non tender.  CARD: S1, S2 normal; no murmurs, gallops, or rubs. Regular rate and rhythm.   RESP: No wheezes, rales or rhonchi.  ABD: soft ntnd  EXT: Normal ROM.  No clubbing, cyanosis or edema.   LYMPH: No acute cervical adenopathy.  NEURO: Alert, oriented, grossly unremarkable  PSYCH: Cooperative, appropriate.

## 2021-06-12 NOTE — ED PROVIDER NOTE - OBJECTIVE STATEMENT
35 y male, no pmh, pw w foreign body sensation in his throat for three days. Endorse feeling as if there is a skin tag. Symptoms getting worse today about three hours before he came. Denies sob, dysphagia, swelling of tongue/lip. Denies f/c, cp, n/v/d, abd pain dysuria.

## 2021-06-12 NOTE — ED ADULT NURSE NOTE - SUICIDE SCREENING QUESTION 3
No
82yo male domiciled with spouse with PMHX CVA with residual RUE weakness, HTN, HLD, PPM, GERD, unknown psychiatric history, who presents to ED with abdominal pain, recently diagnosed with UTI, on Cipro at home, underwent CT in ED significant stool burden s/p disimpaction in ED, noted to have facial droop and LUE weakness, stroke code called. Patient received Haldol and Ativan in ED d/t agitation.    Staff report patient intermittently engages in some conversation and other times appears to purposefully ignore staff. No behavioral issues expressed by staff.    Patient seen, lying in bed with eyes closed, arousable to verbal stimuli, keeps eyes closed, speaks in barely audible tone, mostly mumbles, alert to self, reports in "streets," states, "I don't want to" went asked other questions, interview limited, able to follow command to give thumbs up, denies si, denies ah/vh. No cogwheeling, rigidity and or stiffness noted right upper extremity.    PLAN:  -DEFER obs status to primary team, if any changes in behavior adjust accordingly  -Delirium Workup: B12, Folate, TSH  -Agitation: Haldol 0.5mg po/iv/im q6h prn, if ineffective s/p 30 minutes may give additional 0.5mg-hold if qtc >500  -Monitor EKG qtc interval

## 2021-06-12 NOTE — ED PROVIDER NOTE - CARE PLAN
Assessment and plan of treatment:	Plan: Labs, ivf, decadron, soft tissue neck x-ray, ct, ent consult, reassess.   Principal Discharge DX:	Sore throat  Assessment and plan of treatment:	Plan: Labs, ivf, decadron, soft tissue neck x-ray, ct, ent consult, reassess.

## 2021-06-25 ENCOUNTER — APPOINTMENT (OUTPATIENT)
Dept: SURGERY | Facility: CLINIC | Age: 36
End: 2021-06-25

## 2021-07-01 ENCOUNTER — APPOINTMENT (OUTPATIENT)
Dept: OTOLARYNGOLOGY | Facility: CLINIC | Age: 36
End: 2021-07-01
Payer: COMMERCIAL

## 2021-07-01 DIAGNOSIS — K21.9 GASTRO-ESOPHAGEAL REFLUX DISEASE W/OUT ESOPHAGITIS: ICD-10-CM

## 2021-07-01 PROCEDURE — 99203 OFFICE O/P NEW LOW 30 MIN: CPT | Mod: 25

## 2021-07-01 PROCEDURE — 99072 ADDL SUPL MATRL&STAF TM PHE: CPT

## 2021-07-01 PROCEDURE — 31575 DIAGNOSTIC LARYNGOSCOPY: CPT

## 2021-07-01 NOTE — HISTORY OF PRESENT ILLNESS
[FreeTextEntry1] : Patient presents today with c/o throat discomfort. Patient feels some throat tightness and closing at times. Dysphagia with liquids at times. He feels when drinking water it gets worse. HE admits h/o this sensation about 5 years ago. Pt is a .

## 2021-07-01 NOTE — PROCEDURE
[Dysphagia] : dysphagia not clearly evaluated by indirect laryngoscopy [Complicated Symptoms] : complicated symptoms requiring more thorough examination than provided by mirror [None] : none [Flexible Endoscope] : examined with the flexible endoscope [True Vocal Cords Erythematous] : bilateral true vocal cord edema [Glottis Arytenoid Cartilages Erythema] : bilateral arytenoid ~M erythema [Arytenoid Erythema ___ /4] : arytenoid erythema [unfilled]U/4 [Normal] : posterior cricoid area had healthy pink mucosa in the interarytenoid area and the esophageal inlet [Interarytenoid Edema] : interarytenoid area edematous

## 2021-07-28 ENCOUNTER — APPOINTMENT (OUTPATIENT)
Dept: SURGERY | Facility: CLINIC | Age: 36
End: 2021-07-28
Payer: SUBSIDIZED

## 2021-07-28 VITALS
BODY MASS INDEX: 26.2 KG/M2 | DIASTOLIC BLOOD PRESSURE: 76 MMHG | HEART RATE: 74 BPM | HEIGHT: 66 IN | SYSTOLIC BLOOD PRESSURE: 100 MMHG | WEIGHT: 163 LBS | TEMPERATURE: 98 F

## 2021-07-28 PROCEDURE — 99214 OFFICE O/P EST MOD 30 MIN: CPT

## 2021-07-28 PROCEDURE — 99072 ADDL SUPL MATRL&STAF TM PHE: CPT

## 2021-07-28 NOTE — ASSESSMENT
[FreeTextEntry1] : Odell has been suffering for over a 1-2 years. \par He has left upper quadrant pain . Related to movement \par not related to food.  more than 1-2 year. \par He has egd / colonoscopy \par 2 ct scan . \par He does not need pain medications. \par He has seen gi and not going to colorectal surgery\par 5/3: his blood work is normal , His RAY score is 0.5. ALT is 70s\par He still has pain . \par He wants another CT scan. \par \par 7/28: He is stillhaving the same symtpoms . He had some throat issue - squeesing - went to ed ct and xray are normal . went to see blaise anand - LPR. \par \par \par impression : abodminal pain \par cause?\par motility issues \par reflux?\par \par will get ugi with small bowel series\par and he needs to see his gi \par \par We explained in great detail the pathophysiology of the disease process. We celio diagrams and discussed the workup for diagnosis and management.\par The various options were explained to the patient. The Risk , benefit and alternatives were discussed. We discussed recovery and possible complications.\par \par We counselled and reassured him about pain and its management. \par \par We discussed the importance of close follow up. \par We informed that he needs to follow up in after investigations .\par We also informed that he can call us if anything changes or has any questions.

## 2021-07-28 NOTE — HISTORY OF PRESENT ILLNESS
[de-identified] : Odell has been suffering for over a 1-2 years. \par He has left upper quadrant pain . Related to movement \par not related to food.  more than 1-2 year. \par He has egd / colonoscopy \par 2 ct scan . \par He does not need pain medications. \par He has seen gi and not going to colorectal surgery\par 5/3: his blood work is normal , His RAY score is 0.5. ALT is 70s\par He still has pain . \par He wants another CT scan. \par \par 7/28: He is stillhaving the same symtpoms . He had some throat issue - squeesing - went to ed ct and xray are normal . went to see blaise anand - LPR. \par \par

## 2021-07-28 NOTE — PHYSICAL EXAM
[JVD] : no jugular venous distention  [Respiratory Effort] : normal respiratory effort [Purpura] : no purpura  [Alert] : alert [Calm] : calm [de-identified] : Normal  [de-identified] : Normal  [de-identified] : Normal  [de-identified] : Normal

## 2021-08-05 ENCOUNTER — OUTPATIENT (OUTPATIENT)
Dept: OUTPATIENT SERVICES | Facility: HOSPITAL | Age: 36
LOS: 1 days | Discharge: HOME | End: 2021-08-05
Payer: SUBSIDIZED

## 2021-08-05 ENCOUNTER — RESULT REVIEW (OUTPATIENT)
Age: 36
End: 2021-08-05

## 2021-08-05 DIAGNOSIS — K21.9 GASTRO-ESOPHAGEAL REFLUX DISEASE WITHOUT ESOPHAGITIS: ICD-10-CM

## 2021-08-05 PROCEDURE — 74246 X-RAY XM UPR GI TRC 2CNTRST: CPT | Mod: 26

## 2021-08-10 ENCOUNTER — RESULT REVIEW (OUTPATIENT)
Age: 36
End: 2021-08-10

## 2021-08-10 ENCOUNTER — OUTPATIENT (OUTPATIENT)
Dept: OUTPATIENT SERVICES | Facility: HOSPITAL | Age: 36
LOS: 1 days | Discharge: HOME | End: 2021-08-10
Payer: SUBSIDIZED

## 2021-08-10 DIAGNOSIS — K21.9 GASTRO-ESOPHAGEAL REFLUX DISEASE WITHOUT ESOPHAGITIS: ICD-10-CM

## 2021-08-10 PROCEDURE — 74250 X-RAY XM SM INT 1CNTRST STD: CPT | Mod: 26

## 2021-08-25 ENCOUNTER — APPOINTMENT (OUTPATIENT)
Dept: SURGERY | Facility: CLINIC | Age: 36
End: 2021-08-25
Payer: COMMERCIAL

## 2021-08-25 VITALS
HEART RATE: 74 BPM | WEIGHT: 163 LBS | DIASTOLIC BLOOD PRESSURE: 78 MMHG | HEIGHT: 66 IN | TEMPERATURE: 96.9 F | SYSTOLIC BLOOD PRESSURE: 120 MMHG | BODY MASS INDEX: 26.2 KG/M2

## 2021-08-25 PROCEDURE — 99072 ADDL SUPL MATRL&STAF TM PHE: CPT

## 2021-08-25 PROCEDURE — 99214 OFFICE O/P EST MOD 30 MIN: CPT

## 2021-08-25 NOTE — HISTORY OF PRESENT ILLNESS
[de-identified] : Odell has been suffering for over a 1-2 years. \par He has left upper quadrant pain . Related to movement \par not related to food.  more than 1-2 year. \par He has egd / colonoscopy \par 2 ct scan . \par He does not need pain medications. \par He has seen gi and not going to colorectal surgery\par He had hpyloi - was treeated by DR. Anderson and now stool antigen is negative. \par 5/3: his blood work is normal , His RAY score is 0.5. ALT is 70s\par He still has pain . \par He wants another CT scan. \par \par 7/28: He is stillhaving the same symtpoms . He had some throat issue - squeesing - went to ed ct and xray are normal . went to see blaise anand - LPR. \par 8/25. UGI - mild esophageal dysmotility \par and small bowel series are normal. \par His abdominal pain is better, still hs bloating. \par He has more throat problem - that causes pain . its feels like burning . especially in the morning when he wakes up . \par It is worse when he on the phone . \par

## 2021-08-25 NOTE — ASSESSMENT
[FreeTextEntry1] : Odell has been suffering for over a 1-2 years. \par He has left upper quadrant pain . Related to movement \par not related to food.  more than 1-2 year. \par He has egd / colonoscopy \par 2 ct scan . \par He does not need pain medications. \par He has seen gi and not going to colorectal surgery\par He had hpyloi - was treeated by DR. Anderson and now stool antigen is negative. \par 5/3: his blood work is normal , His RAY score is 0.5. ALT is 70s\par He still has pain . \par He wants another CT scan. \par \par 7/28: He is stillhaving the same symtpoms . He had some throat issue - squeesing - went to ed ct and xray are normal . went to see blaise anand - LPR. \par 8/25. UGI - mild esophageal dysmotility \par and small bowel series are normal. \par His abdominal pain is better, still hs bloating. \par He has more throat problem - that causes pain . its feels like burning . especially in the morning when he wakes up . \par It is worse when he on the phone . \par \par \par impression : abodminal pain \par cause?\par motility issues \par reflux?\par \par and he needs to see his gi \par will schedule him for pH testing and Manometry \par \par We explained in great detail the pathophysiology of the disease process. We celio diagrams and discussed the workup for diagnosis and management.\par The various options were explained to the patient. The Risk , benefit and alternatives were discussed. We discussed recovery and possible complications.\par \par We counselled and reassured him about pain and its management. \par \par We discussed the importance of close follow up. \par We informed that he needs to follow up in after investigations .\par We also informed that he can call us if anything changes or has any questions.

## 2021-08-25 NOTE — PHYSICAL EXAM
[Respiratory Effort] : normal respiratory effort [Alert] : alert [Calm] : calm [JVD] : no jugular venous distention  [Purpura] : no purpura  [de-identified] : Normal  [de-identified] : Normal  [de-identified] : Normal  [de-identified] : Normal

## 2021-09-09 ENCOUNTER — APPOINTMENT (OUTPATIENT)
Dept: OTOLARYNGOLOGY | Facility: CLINIC | Age: 36
End: 2021-09-09

## 2021-09-17 ENCOUNTER — OUTPATIENT (OUTPATIENT)
Dept: OUTPATIENT SERVICES | Facility: HOSPITAL | Age: 36
LOS: 1 days | Discharge: HOME | End: 2021-09-17

## 2021-09-17 ENCOUNTER — APPOINTMENT (OUTPATIENT)
Dept: GASTROENTEROLOGY | Facility: CLINIC | Age: 36
End: 2021-09-17
Payer: COMMERCIAL

## 2021-09-17 ENCOUNTER — NON-APPOINTMENT (OUTPATIENT)
Age: 36
End: 2021-09-17

## 2021-09-17 DIAGNOSIS — R10.12 LEFT UPPER QUADRANT PAIN: ICD-10-CM

## 2021-09-17 DIAGNOSIS — K62.9 DISEASE OF ANUS AND RECTUM, UNSPECIFIED: ICD-10-CM

## 2021-09-17 DIAGNOSIS — Z87.898 PERSONAL HISTORY OF OTHER SPECIFIED CONDITIONS: ICD-10-CM

## 2021-09-17 DIAGNOSIS — R13.10 DYSPHAGIA, UNSPECIFIED: ICD-10-CM

## 2021-09-17 PROCEDURE — 99214 OFFICE O/P EST MOD 30 MIN: CPT | Mod: GC

## 2021-09-21 ENCOUNTER — NON-APPOINTMENT (OUTPATIENT)
Age: 36
End: 2021-09-21

## 2021-09-21 ENCOUNTER — APPOINTMENT (OUTPATIENT)
Dept: INTERNAL MEDICINE | Facility: CLINIC | Age: 36
End: 2021-09-21
Payer: COMMERCIAL

## 2021-09-21 ENCOUNTER — OUTPATIENT (OUTPATIENT)
Dept: OUTPATIENT SERVICES | Facility: HOSPITAL | Age: 36
LOS: 1 days | Discharge: HOME | End: 2021-09-21

## 2021-09-21 VITALS
BODY MASS INDEX: 25.55 KG/M2 | OXYGEN SATURATION: 98 % | HEIGHT: 66 IN | TEMPERATURE: 97 F | SYSTOLIC BLOOD PRESSURE: 126 MMHG | WEIGHT: 159 LBS | DIASTOLIC BLOOD PRESSURE: 78 MMHG | HEART RATE: 98 BPM

## 2021-09-21 DIAGNOSIS — G43.109 MIGRAINE WITH AURA, NOT INTRACTABLE, W/OUT STATUS MIGRAINOSUS: ICD-10-CM

## 2021-09-21 LAB
25(OH)D3 SERPL-MCNC: 26 NG/ML
ALBUMIN SERPL ELPH-MCNC: 4.6 G/DL
ALP BLD-CCNC: 76 U/L
ALT SERPL-CCNC: 26 U/L
ANION GAP SERPL CALC-SCNC: 9 MMOL/L
AST SERPL-CCNC: 20 U/L
BASOPHILS # BLD AUTO: 0.05 K/UL
BASOPHILS NFR BLD AUTO: 0.7 %
BILIRUB SERPL-MCNC: 0.6 MG/DL
BUN SERPL-MCNC: 12 MG/DL
CALCIUM SERPL-MCNC: 9.7 MG/DL
CHLORIDE SERPL-SCNC: 105 MMOL/L
CHOLEST SERPL-MCNC: 187 MG/DL
CO2 SERPL-SCNC: 29 MMOL/L
CREAT SERPL-MCNC: 1 MG/DL
EOSINOPHIL # BLD AUTO: 0.07 K/UL
EOSINOPHIL NFR BLD AUTO: 1 %
ESTIMATED AVERAGE GLUCOSE: 100 MG/DL
GLUCOSE SERPL-MCNC: 91 MG/DL
HBA1C MFR BLD HPLC: 5.1 %
HCT VFR BLD CALC: 45.2 %
HDLC SERPL-MCNC: 41 MG/DL
HGB BLD-MCNC: 15 G/DL
IMM GRANULOCYTES NFR BLD AUTO: 0.4 %
LDLC SERPL CALC-MCNC: 119 MG/DL
LYMPHOCYTES # BLD AUTO: 1.83 K/UL
LYMPHOCYTES NFR BLD AUTO: 26.5 %
MAN DIFF?: NORMAL
MCHC RBC-ENTMCNC: 28.7 PG
MCHC RBC-ENTMCNC: 33.2 G/DL
MCV RBC AUTO: 86.4 FL
MONOCYTES # BLD AUTO: 0.46 K/UL
MONOCYTES NFR BLD AUTO: 6.7 %
NEUTROPHILS # BLD AUTO: 4.46 K/UL
NEUTROPHILS NFR BLD AUTO: 64.7 %
NONHDLC SERPL-MCNC: 146 MG/DL
PLATELET # BLD AUTO: 220 K/UL
POTASSIUM SERPL-SCNC: 5 MMOL/L
PROT SERPL-MCNC: 7.3 G/DL
RBC # BLD: 5.23 M/UL
RBC # FLD: 13.1 %
SODIUM SERPL-SCNC: 143 MMOL/L
TRIGL SERPL-MCNC: 157 MG/DL
TSH SERPL-ACNC: 2.34 UIU/ML
WBC # FLD AUTO: 6.9 K/UL

## 2021-09-21 PROCEDURE — 99212 OFFICE O/P EST SF 10 MIN: CPT | Mod: GC

## 2021-09-21 NOTE — PHYSICAL EXAM
[No Acute Distress] : no acute distress [No Respiratory Distress] : no respiratory distress  [Normal Rate] : normal rate  [No Edema] : there was no peripheral edema [Soft] : abdomen soft [No HSM] : no HSM [No CVA Tenderness] : no CVA  tenderness [No Joint Swelling] : no joint swelling [Speech Grossly Normal] : speech grossly normal

## 2021-09-21 NOTE — ASSESSMENT
[FreeTextEntry1] : # aly with visual disturbance\par - naproxen prn\par - repsonds well to nsaids\par - gets it 2-3 days per week\par \par # Chronic LUQ pain\par - extensive w/u done\par - planned for manometry\par - etiology still unclear\par - f/u gi\par \par # Asymptomatic COVID-19 infection\par - resolved\par \par # Eczema \par - resolved, endorses L hand rash a week ago but currently resolved using unknown cream\par - encouraged to see derm\par \par # Hepatic steatosis\par - RAY fiborsure 4/30/21: + RAY; -fibrosis\par - Following with GI\par - AST/ALT normal now, weight loss noted.\par \par \par # Hemorrhoid\par - assymptomatic\par \par \par HCM:\par - Encourage patient getting the COVID vaccine, refused. had covid last year and doesn’t want to take vaccibne\par - RTC: follow up in 6mo or prn\par

## 2021-09-23 ENCOUNTER — NON-APPOINTMENT (OUTPATIENT)
Age: 36
End: 2021-09-23

## 2021-09-23 DIAGNOSIS — G43.109 MIGRAINE WITH AURA, NOT INTRACTABLE, WITHOUT STATUS MIGRAINOSUS: ICD-10-CM

## 2021-09-25 ENCOUNTER — EMERGENCY (EMERGENCY)
Facility: HOSPITAL | Age: 36
LOS: 0 days | Discharge: HOME | End: 2021-09-25
Attending: EMERGENCY MEDICINE | Admitting: EMERGENCY MEDICINE
Payer: SUBSIDIZED

## 2021-09-25 VITALS
RESPIRATION RATE: 18 BRPM | SYSTOLIC BLOOD PRESSURE: 123 MMHG | TEMPERATURE: 98 F | HEIGHT: 66 IN | HEART RATE: 73 BPM | DIASTOLIC BLOOD PRESSURE: 76 MMHG | OXYGEN SATURATION: 100 %

## 2021-09-25 DIAGNOSIS — R42 DIZZINESS AND GIDDINESS: ICD-10-CM

## 2021-09-25 DIAGNOSIS — R00.2 PALPITATIONS: ICD-10-CM

## 2021-09-25 DIAGNOSIS — Z87.19 PERSONAL HISTORY OF OTHER DISEASES OF THE DIGESTIVE SYSTEM: ICD-10-CM

## 2021-09-25 DIAGNOSIS — R22.1 LOCALIZED SWELLING, MASS AND LUMP, NECK: ICD-10-CM

## 2021-09-25 DIAGNOSIS — R51.9 HEADACHE, UNSPECIFIED: ICD-10-CM

## 2021-09-25 DIAGNOSIS — E78.5 HYPERLIPIDEMIA, UNSPECIFIED: ICD-10-CM

## 2021-09-25 LAB
ALBUMIN SERPL ELPH-MCNC: 4.3 G/DL — SIGNIFICANT CHANGE UP (ref 3.5–5.2)
ALP SERPL-CCNC: 75 U/L — SIGNIFICANT CHANGE UP (ref 30–115)
ALT FLD-CCNC: 22 U/L — SIGNIFICANT CHANGE UP (ref 0–41)
ANION GAP SERPL CALC-SCNC: 11 MMOL/L — SIGNIFICANT CHANGE UP (ref 7–14)
AST SERPL-CCNC: 22 U/L — SIGNIFICANT CHANGE UP (ref 0–41)
BASOPHILS # BLD AUTO: 0.04 K/UL — SIGNIFICANT CHANGE UP (ref 0–0.2)
BASOPHILS NFR BLD AUTO: 0.6 % — SIGNIFICANT CHANGE UP (ref 0–1)
BILIRUB SERPL-MCNC: 0.5 MG/DL — SIGNIFICANT CHANGE UP (ref 0.2–1.2)
BUN SERPL-MCNC: 14 MG/DL — SIGNIFICANT CHANGE UP (ref 10–20)
CALCIUM SERPL-MCNC: 8.7 MG/DL — SIGNIFICANT CHANGE UP (ref 8.5–10.1)
CHLORIDE SERPL-SCNC: 106 MMOL/L — SIGNIFICANT CHANGE UP (ref 98–110)
CO2 SERPL-SCNC: 22 MMOL/L — SIGNIFICANT CHANGE UP (ref 17–32)
CREAT SERPL-MCNC: 0.8 MG/DL — SIGNIFICANT CHANGE UP (ref 0.7–1.5)
EOSINOPHIL # BLD AUTO: 0.07 K/UL — SIGNIFICANT CHANGE UP (ref 0–0.7)
EOSINOPHIL NFR BLD AUTO: 1 % — SIGNIFICANT CHANGE UP (ref 0–8)
GLUCOSE SERPL-MCNC: 97 MG/DL — SIGNIFICANT CHANGE UP (ref 70–99)
HCT VFR BLD CALC: 41.5 % — LOW (ref 42–52)
HGB BLD-MCNC: 13.8 G/DL — LOW (ref 14–18)
IMM GRANULOCYTES NFR BLD AUTO: 0.3 % — SIGNIFICANT CHANGE UP (ref 0.1–0.3)
LYMPHOCYTES # BLD AUTO: 1.66 K/UL — SIGNIFICANT CHANGE UP (ref 1.2–3.4)
LYMPHOCYTES # BLD AUTO: 24.2 % — SIGNIFICANT CHANGE UP (ref 20.5–51.1)
MCHC RBC-ENTMCNC: 28.4 PG — SIGNIFICANT CHANGE UP (ref 27–31)
MCHC RBC-ENTMCNC: 33.3 G/DL — SIGNIFICANT CHANGE UP (ref 32–37)
MCV RBC AUTO: 85.4 FL — SIGNIFICANT CHANGE UP (ref 80–94)
MONOCYTES # BLD AUTO: 0.44 K/UL — SIGNIFICANT CHANGE UP (ref 0.1–0.6)
MONOCYTES NFR BLD AUTO: 6.4 % — SIGNIFICANT CHANGE UP (ref 1.7–9.3)
NEUTROPHILS # BLD AUTO: 4.62 K/UL — SIGNIFICANT CHANGE UP (ref 1.4–6.5)
NEUTROPHILS NFR BLD AUTO: 67.5 % — SIGNIFICANT CHANGE UP (ref 42.2–75.2)
NRBC # BLD: 0 /100 WBCS — SIGNIFICANT CHANGE UP (ref 0–0)
PLATELET # BLD AUTO: 211 K/UL — SIGNIFICANT CHANGE UP (ref 130–400)
POTASSIUM SERPL-MCNC: 4.3 MMOL/L — SIGNIFICANT CHANGE UP (ref 3.5–5)
POTASSIUM SERPL-SCNC: 4.3 MMOL/L — SIGNIFICANT CHANGE UP (ref 3.5–5)
PROT SERPL-MCNC: 6.9 G/DL — SIGNIFICANT CHANGE UP (ref 6–8)
RBC # BLD: 4.86 M/UL — SIGNIFICANT CHANGE UP (ref 4.7–6.1)
RBC # FLD: 13 % — SIGNIFICANT CHANGE UP (ref 11.5–14.5)
SODIUM SERPL-SCNC: 139 MMOL/L — SIGNIFICANT CHANGE UP (ref 135–146)
TROPONIN T SERPL-MCNC: <0.01 NG/ML — SIGNIFICANT CHANGE UP
WBC # BLD: 6.85 K/UL — SIGNIFICANT CHANGE UP (ref 4.8–10.8)
WBC # FLD AUTO: 6.85 K/UL — SIGNIFICANT CHANGE UP (ref 4.8–10.8)

## 2021-09-25 PROCEDURE — 93010 ELECTROCARDIOGRAM REPORT: CPT

## 2021-09-25 PROCEDURE — 99285 EMERGENCY DEPT VISIT HI MDM: CPT

## 2021-09-25 PROCEDURE — 99053 MED SERV 10PM-8AM 24 HR FAC: CPT

## 2021-09-25 RX ORDER — KETOROLAC TROMETHAMINE 30 MG/ML
15 SYRINGE (ML) INJECTION ONCE
Refills: 0 | Status: DISCONTINUED | OUTPATIENT
Start: 2021-09-25 | End: 2021-09-25

## 2021-09-25 RX ORDER — SODIUM CHLORIDE 9 MG/ML
1000 INJECTION INTRAMUSCULAR; INTRAVENOUS; SUBCUTANEOUS ONCE
Refills: 0 | Status: COMPLETED | OUTPATIENT
Start: 2021-09-25 | End: 2021-09-25

## 2021-09-25 RX ORDER — METOCLOPRAMIDE HCL 10 MG
10 TABLET ORAL ONCE
Refills: 0 | Status: COMPLETED | OUTPATIENT
Start: 2021-09-25 | End: 2021-09-25

## 2021-09-25 RX ADMIN — Medication 15 MILLIGRAM(S): at 06:13

## 2021-09-25 RX ADMIN — Medication 104 MILLIGRAM(S): at 06:12

## 2021-09-25 RX ADMIN — SODIUM CHLORIDE 1000 MILLILITER(S): 9 INJECTION INTRAMUSCULAR; INTRAVENOUS; SUBCUTANEOUS at 04:58

## 2021-09-25 NOTE — ED ADULT TRIAGE NOTE - CHIEF COMPLAINT QUOTE
Pt presents to ED with stated complaints of "I woke up and felt like I was going to faint." Denies any chest pain or headache.

## 2021-09-25 NOTE — ED PROVIDER NOTE - OBJECTIVE STATEMENT
Pt is a 35 y/o male with PMH of h pylori gastritis and HLD presenting for an episode of lightheadedness. Pt says he was sleeping and stood up from bed at 2am (1hr PTA) when he felt lightheaded and palpitations. No LOC, no fall. Pt laid back in bed and felt better. Associated with a feeling of a lump in his throat like his tongue is heavy and headache. Feels like he has gas from his stomach coming up his throat. Follows with a GI Dr, does not remember the name, who he saw last week and last endoscopy 2 yrs ago showed h pylori. Denies fever, chills, cough, chest pain, SOB, abdominal pain, dysuria, diarrhea, new leg swelling or recent travel/surgeries.

## 2021-09-25 NOTE — ED PROVIDER NOTE - PHYSICAL EXAMINATION
CONST: well appearing for age  HEAD:  normocephalic, atraumatic  EYES: PERRLA, conjunctivae without injection, drainage or discharge  ENMT:  tympanic membranes pearly gray with normal landmarks; nasal mucosa moist; mouth moist without ulcerations or lesions; throat moist without erythema, exudate, ulcerations or lesions  NECK:  supple, no masses, no significant lymphadenopathy  CARDIAC: +2 pulses, regular rate and rhythm, normal S1 and S2, no murmurs, rubs or gallops  RESP:  respiratory rate and effort appear normal for age; lungs are clear to auscultation bilaterally; no rales or wheezes  ABDOMEN:  soft, nontender, nondistended, no masses, no organomegaly  LYMPHATICS:  no significant lymphadenopathy  MUSCULOSKELETAL/NEURO: CN II-XII grossly intact, sensation intact throughout, 5/5 strength in all extremities, no dysmetria, normal gait, normal movement, normal tone  SKIN:  normal skin color for age and race, well-perfused; warm and dry

## 2021-09-25 NOTE — ED PROVIDER NOTE - PATIENT PORTAL LINK FT
You can access the FollowMyHealth Patient Portal offered by [facility] by registering at the following website: [email or web address]. By joining [employer]’s FollowMyHealth portal, you will also be able to view your health information using other applications (apps) compatible with our system.

## 2021-09-25 NOTE — ED PROVIDER NOTE - CARE PROVIDERS DIRECT ADDRESSES
,DirectAddress_Unknown,[email or web address]

## 2021-09-25 NOTE — ED PROVIDER NOTE - CARE PROVIDER_API CALL
Mitch Madrigal  GASTROENTEROLOGY  [address] [place] [id number]  Phone: [phone]  Fax: [phone]  Follow Up Time: 1-3 Days    Renee Holland)  Gastroenterology  [address] [place] [address]  Phone: [phone]  Fax: [phone]  Follow Up Time: 1-3 Days    Rufus Dacosta)  Gastroenterology  [address] [place] [address]  Phone: [phone]  Fax: [phone]  Follow Up Time: 1-3 Days

## 2021-09-25 NOTE — ED PROVIDER NOTE - ATTENDING CONTRIBUTION TO CARE
I personally evaluated the patient. I reviewed the Resident’s or Physician Assistant’s note (as assigned above), and agree with the findings and plan except as documented in my note.  Pt with complaints of lightheadedness and associated palpitations with onset about 1 hr pta when he woke up and stood up from lying. Also complaining of tongue heaviness. No CP, no LOC. VS reviewed, pt non-toxic appearing, NAD. Head ncat, MMM, neck supple, normal ROM, normal s1s2 without any murmurs, Lungs CTAB with normal work of breathing. abd +BS, s/nd/nt, extremities wnl, AAO x 3, CN II to XII wnl, normal motor, sensation, cerebellar exams, gait deferred. No acute skin rashes. Plan is ekg, labs, imaging, monitor and reassess.

## 2021-09-25 NOTE — ED PROVIDER NOTE - CLINICAL SUMMARY MEDICAL DECISION MAKING FREE TEXT BOX
Pt presented with lightheadedness. Required ekg,labs, and mes. Symptoms improved. Will dc with outpt f/up.

## 2021-09-25 NOTE — ED PROVIDER NOTE - PROVIDER TOKENS
PROVIDER:[TOKEN:[[id number]],FOLLOWUP:[1-3 Days]],PROVIDER:[TOKEN:[[id number]],FOLLOWUP:[1-3 Days]],PROVIDER:[TOKEN:[[id number]],FOLLOWUP:[1-3 Days]]

## 2021-09-25 NOTE — ED ADULT NURSE NOTE - NSIMPLEMENTINTERV_GEN_ALL_ED
Implemented All Fall Risk Interventions:  [place] to call system. Call bell, personal items and telephone within reach. Instruct patient to call for assistance. Room bathroom lighting operational. Non-slip footwear when patient is off stretcher. Physically safe environment: no spills, clutter or unnecessary equipment. Stretcher in lowest position, wheels locked, appropriate side rails in place. Provide visual cue, wrist band, yellow gown, etc. Monitor gait and stability. Monitor for mental status changes and reorient to person, place, and time. Review medications for side effects contributing to fall risk. Reinforce activity limits and safety measures with patient and family.

## 2021-09-25 NOTE — ED PROVIDER NOTE - NS ED ROS FT
Review of Systems:  CONSTITUTIONAL: + lightheadedness, No fever, No diaphoresis, No weight change  SKIN: No rash  HEMATOLOGIC: No abnormal bleeding or bruising  EYES: No eye pain, No blurred vision  ENT: + lump in throat, No change in hearing, No sore throat, No neck pain, No rhinorrhea, No ear pain  RESPIRATORY: No shortness of breath, No cough  CARDIAC: + palpitations, No chest pain  GI: No abdominal pain, No nausea, No vomiting, No diarrhea, No constipation, No bright red blood per rectum or melena. No flank pain  : No dysuria, frequency, hematuria.   ENDO: No polydypsia, No polyuria, No heat/cold intolerance  MUSCULOSKELETAL: No joint pain, No swelling, No back pain  NEUROLOGIC: + headache, No numbness, No focal weakness, No dizziness  All other systems negative, unless specified in HPI

## 2021-09-27 DIAGNOSIS — R10.9 UNSPECIFIED ABDOMINAL PAIN: ICD-10-CM

## 2021-09-27 DIAGNOSIS — R13.10 DYSPHAGIA, UNSPECIFIED: ICD-10-CM

## 2021-09-27 DIAGNOSIS — R10.12 LEFT UPPER QUADRANT PAIN: ICD-10-CM

## 2021-09-27 DIAGNOSIS — K62.9 DISEASE OF ANUS AND RECTUM, UNSPECIFIED: ICD-10-CM

## 2021-09-27 DIAGNOSIS — R07.89 OTHER CHEST PAIN: ICD-10-CM

## 2021-09-28 ENCOUNTER — NON-APPOINTMENT (OUTPATIENT)
Age: 36
End: 2021-09-28

## 2021-09-28 ENCOUNTER — APPOINTMENT (OUTPATIENT)
Dept: CARDIOLOGY | Facility: CLINIC | Age: 36
End: 2021-09-28
Payer: COMMERCIAL

## 2021-09-28 ENCOUNTER — OUTPATIENT (OUTPATIENT)
Dept: OUTPATIENT SERVICES | Facility: HOSPITAL | Age: 36
LOS: 1 days | Discharge: HOME | End: 2021-09-28
Payer: SUBSIDIZED

## 2021-09-28 VITALS
BODY MASS INDEX: 25.23 KG/M2 | HEIGHT: 66 IN | DIASTOLIC BLOOD PRESSURE: 72 MMHG | SYSTOLIC BLOOD PRESSURE: 117 MMHG | TEMPERATURE: 97.5 F | HEART RATE: 67 BPM | WEIGHT: 157 LBS | OXYGEN SATURATION: 98 %

## 2021-09-28 DIAGNOSIS — Z78.9 OTHER SPECIFIED HEALTH STATUS: ICD-10-CM

## 2021-09-28 PROCEDURE — 99203 OFFICE O/P NEW LOW 30 MIN: CPT

## 2021-09-28 PROCEDURE — 93010 ELECTROCARDIOGRAM REPORT: CPT

## 2021-09-28 NOTE — ASSESSMENT
[FreeTextEntry1] : 36 year-old male with PMHx of nummular eczema, GERD, H. pylori gastritis s/p eradication, chronic LUQ pain, epigastric swelling, asymptomatic COVID-19 infection present to the cardiology clinic as an initial visit for preop clearance for manometry and 24hr PH monitoring.\par \par \par #Chest pain, atypical\par - no significant cardiovascular risk factors \par  - Patient reports experiencing intermittent pressure like sensation in the left chest,  not related to exertion, non-radiating lasting for approx. 20 minutes and relieves on its own. \par - Patient reports he notices he cannot run in the morning like he use to and walks instead with the fear of experiencing chest pain. \par - reports experiencing similar symptoms in 2018 in which he went to the ED and was referred to have a stress test/ekg performed with resulted no evidence of stress induced ischemia\par - EKG 9/28: NSR; Left axis deviation; no ischemic changes \par - RCR index score: class 1 risk (0 points)\par - pending ECHO and stress test results\par - GI workup (monometry/ ph moniotring) cardiac clearance upon echo/stress test results \par \par I was physically present for the key portions of the evaluation and management (E/M) service provided.  I agree with the above history, physical, and plan which I have reviewed and edited where appropriate.  This was reviewed with the medical resident (Dr. Casillas).\par \par \par Number/complexity of problems -  Mod - 1 undiagnosed new problem with uncertain prognosis\par Amount/complexity of data -history, exam, reviewed old notes by medicine and GI, labs reviewed, ekg reviewed\par Risk of complications - Low\par \par \par \par

## 2021-09-28 NOTE — HISTORY OF PRESENT ILLNESS
[FreeTextEntry1] : 36 year-old male with PMHx of nummular eczema, GERD, H. pylori gastritis s/p eradication, chronic LUQ pain, epigastric swelling, asymptomatic COVID-19 infection present to the cardiology clinic as an initial visit for preop clearance for manometry and 24hr PH monitoring. Patient report no hx of CAD/HTN or has never seen a cardiologist in the past. However patient reports he does experience intermittent chest pain, pressure type, not related to exertion, nonradiating lasting for approx. 20 minutes and relieves on its own. Patient reports he notices he cannot run in the morning like he use to and walks instead with the fear of experiencing chest pain. \par \par Patient reports experiencing similar symptoms in 2018 in which he went to the ED and was referred to have a stress test/ekg performed with resulted no evidence of stress induced ischemia.

## 2021-09-28 NOTE — CARDIOLOGY SUMMARY
[de-identified] : NSR: 09/28/2021 [de-identified] :  Baseline EKG: NSR Per technician comments, patient had 4/10 chest pain prior to, and throughout, stress test that was non-limiting. No diagnostic EKG changes. No EKG evidence of stress induced ischemia. Clinically correlate

## 2021-09-28 NOTE — PHYSICAL EXAM
[Well Developed] : well developed [Well Nourished] : well nourished [Normal Conjunctiva] : normal conjunctiva [Normal S1, S2] : normal S1, S2 [No Murmur] : no murmur [Clear Lung Fields] : clear lung fields [Good Air Entry] : good air entry [No Respiratory Distress] : no respiratory distress  [Soft] : abdomen soft [Non Tender] : non-tender [No Edema] : no edema [No Cyanosis] : no cyanosis

## 2021-09-30 ENCOUNTER — EMERGENCY (EMERGENCY)
Facility: HOSPITAL | Age: 36
LOS: 0 days | Discharge: HOME | End: 2021-09-30
Attending: EMERGENCY MEDICINE | Admitting: EMERGENCY MEDICINE
Payer: MEDICAID

## 2021-09-30 VITALS
WEIGHT: 156.97 LBS | OXYGEN SATURATION: 98 % | HEIGHT: 66 IN | RESPIRATION RATE: 20 BRPM | SYSTOLIC BLOOD PRESSURE: 110 MMHG | DIASTOLIC BLOOD PRESSURE: 72 MMHG | HEART RATE: 77 BPM | TEMPERATURE: 98 F

## 2021-09-30 DIAGNOSIS — R93.0 ABNORMAL FINDINGS ON DIAGNOSTIC IMAGING OF SKULL AND HEAD, NOT ELSEWHERE CLASSIFIED: ICD-10-CM

## 2021-09-30 DIAGNOSIS — G44.229 CHRONIC TENSION-TYPE HEADACHE, NOT INTRACTABLE: ICD-10-CM

## 2021-09-30 DIAGNOSIS — R51.9 HEADACHE, UNSPECIFIED: ICD-10-CM

## 2021-09-30 DIAGNOSIS — E78.5 HYPERLIPIDEMIA, UNSPECIFIED: ICD-10-CM

## 2021-09-30 PROCEDURE — 70450 CT HEAD/BRAIN W/O DYE: CPT | Mod: 26,MA

## 2021-09-30 PROCEDURE — 99284 EMERGENCY DEPT VISIT MOD MDM: CPT

## 2021-09-30 RX ORDER — KETOROLAC TROMETHAMINE 30 MG/ML
30 SYRINGE (ML) INJECTION ONCE
Refills: 0 | Status: DISCONTINUED | OUTPATIENT
Start: 2021-09-30 | End: 2021-09-30

## 2021-09-30 RX ORDER — ACETAMINOPHEN 500 MG
650 TABLET ORAL ONCE
Refills: 0 | Status: COMPLETED | OUTPATIENT
Start: 2021-09-30 | End: 2021-09-30

## 2021-09-30 RX ORDER — METOCLOPRAMIDE HCL 10 MG
10 TABLET ORAL ONCE
Refills: 0 | Status: COMPLETED | OUTPATIENT
Start: 2021-09-30 | End: 2021-09-30

## 2021-09-30 RX ADMIN — Medication 650 MILLIGRAM(S): at 10:29

## 2021-09-30 RX ADMIN — Medication 650 MILLIGRAM(S): at 12:39

## 2021-09-30 RX ADMIN — Medication 30 MILLIGRAM(S): at 10:28

## 2021-09-30 RX ADMIN — Medication 30 MILLIGRAM(S): at 12:39

## 2021-09-30 RX ADMIN — Medication 10 MILLIGRAM(S): at 10:27

## 2021-09-30 NOTE — ED PROVIDER NOTE - PATIENT PORTAL LINK FT
You can access the FollowMyHealth Patient Portal offered by [facility] by registering at the following website: [email or web address]. By joining [employer]’s FollowMyHealth portal, you will also be able to view your health information using other applications (apps) compatible with our system.

## 2021-09-30 NOTE — ED ADULT TRIAGE NOTE - NSWEIGHTCALCTOOLDRUG_GEN_A_CORE
Weight (last 2 days) before discharge     Date/Time   Weight    01/19/21 0600   56 2 (123 9)    01/18/21 0532   52 2 (115 08)    01/17/21 0600   52 5 (115 74)            · Acute systolic CHF new diagnosis  Cardiac catheterization negative for occlusive disease  · Holding ACE-inhibitor secondary to kidney injury    Continue hydralazine/isosorbide  · Lasix titrating per cardiology  · Life vest delivered at bedside  used

## 2021-09-30 NOTE — ED PROVIDER NOTE - NS ED ROS FT
Constitutional:  weight loss of approx 5 lbs, no night sweats or fever  Eyes:  No visual changes  ENMT: No neck pain or stiffness  Cardiac:  No chest pain  Respiratory:  No cough or respiratory distress.   GI:  No nausea, vomiting, diarrhea or abdominal pain.  :  No dysuria, frequency or burning.  MS:  No back pain.  Neuro:  see HPI  Skin:  No skin rash  Except as documented in the HPI,  all other systems are negative

## 2021-09-30 NOTE — ED PROVIDER NOTE - NSFOLLOWUPCLINICS_GEN_ALL_ED_FT
[facility]  Medicine  [address], [place]   Phone: [phone]  Fax:   Established Patient  Follow Up Time: 4-6 Days

## 2021-09-30 NOTE — ED PROVIDER NOTE - PHYSICAL EXAMINATION
CONSTITUTIONAL: nontoxic appearing and comfortable, sitting up in a chair  SKIN: Warm dry  HEAD: NCAT, no rash, NT to palpation  EYES: NL inspection, JOSEPH, EOMI  ENT: MMM  NECK: Supple; non tender.  CARD: S1S2, RRR  RESP: CTAB/L  ABD: soft, NT, ND  EXT: no pedal edema  NEURO: CN2-12 intact, no motor or sensory deficit, no romberg, normal gait, no dysmetria  PSYCH: anxious, but cooperative, appropriate.

## 2021-09-30 NOTE — ED PROVIDER NOTE - OBJECTIVE STATEMENT
37yo man h/o HDL, diet controlled, otherwise healthy nonsmoker c/o HA x 1 month. Pt reports a gradually worsening HA that begins at the vertex of his head and moves posteriorly; no neck pain, no trauma, no visual changes, nausea, or vomiting, though he does complain that loud noises and talking on the phone exacerbates the HA. It has been constant since it started, waxes and wanes with tylenol, but does not go away completely. Over the last week it has made sleeping difficult, and pt feels he has lost weight because of it (about 5 pounds). He has been working through the pain.

## 2021-09-30 NOTE — ED ADULT TRIAGE NOTE - CHIEF COMPLAINT QUOTE
c/o headache x 1 month, patient denies trauma, denies visual changes, denies anticoagulation, denies medical Hx

## 2021-09-30 NOTE — ED PROVIDER NOTE - CLINICAL SUMMARY MEDICAL DECISION MAKING FREE TEXT BOX
Pt feeling better, though not totally resolved, after reglan and toradol. Head CT with subarachnoid cysts and low lying cerebellum, likely congenital; doubt etiology for HA. As pt has no red flags for acute dz, and CT with no mass, will d/c to f/u in medical clinic. Return precautions were discussed extensively and pt voiced understand.

## 2021-10-01 ENCOUNTER — APPOINTMENT (OUTPATIENT)
Dept: INTERNAL MEDICINE | Facility: CLINIC | Age: 36
End: 2021-10-01
Payer: COMMERCIAL

## 2021-10-01 ENCOUNTER — OUTPATIENT (OUTPATIENT)
Dept: OUTPATIENT SERVICES | Facility: HOSPITAL | Age: 36
LOS: 1 days | Discharge: HOME | End: 2021-10-01

## 2021-10-01 ENCOUNTER — NON-APPOINTMENT (OUTPATIENT)
Age: 36
End: 2021-10-01

## 2021-10-01 VITALS
BODY MASS INDEX: 28.52 KG/M2 | SYSTOLIC BLOOD PRESSURE: 104 MMHG | HEART RATE: 68 BPM | WEIGHT: 155 LBS | HEIGHT: 62 IN | TEMPERATURE: 97 F | DIASTOLIC BLOOD PRESSURE: 72 MMHG | OXYGEN SATURATION: 97 %

## 2021-10-01 DIAGNOSIS — Z86.79 PERSONAL HISTORY OF OTHER DISEASES OF THE CIRCULATORY SYSTEM: ICD-10-CM

## 2021-10-01 DIAGNOSIS — B35.1 TINEA UNGUIUM: ICD-10-CM

## 2021-10-01 DIAGNOSIS — Z86.16 PERSONAL HISTORY OF COVID-19: ICD-10-CM

## 2021-10-01 PROCEDURE — 99213 OFFICE O/P EST LOW 20 MIN: CPT | Mod: GC

## 2021-10-01 RX ORDER — NAPROXEN 500 MG/1
500 TABLET ORAL
Qty: 60 | Refills: 0 | Status: DISCONTINUED | COMMUNITY
Start: 2021-09-21 | End: 2021-10-01

## 2021-10-01 NOTE — REVIEW OF SYSTEMS
[Chest Pain] : chest pain [Headache] : headache [Dizziness] : dizziness [Fever] : no fever [Chills] : no chills [Orthopena] : no orthopnea [Shortness Of Breath] : no shortness of breath [Abdominal Pain] : no abdominal pain [Nausea] : no nausea [Vomiting] : no vomiting [Dysuria] : no dysuria [Skin Rash] : no skin rash [FreeTextEntry5] : atypical chest pain - being evaluated by cardio

## 2021-10-01 NOTE — PHYSICAL EXAM
[No Acute Distress] : no acute distress [No JVD] : no jugular venous distention [No Respiratory Distress] : no respiratory distress  [Normal Rate] : normal rate  [No Edema] : there was no peripheral edema [Soft] : abdomen soft [Non Tender] : non-tender [No CVA Tenderness] : no CVA  tenderness [No Joint Swelling] : no joint swelling [Coordination Grossly Intact] : coordination grossly intact [No Focal Deficits] : no focal deficits [Normal Gait] : normal gait [de-identified] : Cn 2-12 intact, no cerebellar signs

## 2021-10-01 NOTE — HISTORY OF PRESENT ILLNESS
[FreeTextEntry1] : follow up [de-identified] : 36 year-old male with PMHx of nummular eczema, recurrent GERD, H. pylori gastritis s/p eradication, chronic LUQ pain, epigastric swelling, asymptomatic COVID-19 infection, DLD. \par Patient recently seen by GI for GERD symptoms refractory to PPI. Patient will need pH manometry however patient is pending stress test for cardiology clearance. \par \par Interval hx. \par Patient went to the ED on 9/30 for severe headache. Headache for 1 month duration. Headache is almost constant in nature. This headache is different from any other headache he has had. Headache is not affected by light. Headache is worse with with sneezing. He has been waking up at night because of his headache. No nausea or vomiting. Pain sometimes radiates to his neck. \par Patient is a . He denies any head injuries. \par Pain not resolved with advil or tylenol \par He reports 10lbs on unintentional weight loss over the past 2 months. \par \par in the ED he had a Ct head: small arachnoid cysts, with a left vertex mild mass effect. Needs MRI. \par

## 2021-10-01 NOTE — ASSESSMENT
[FreeTextEntry1] : 36 year-old male with PMHx of nummular eczema, recurrent GERD, H. pylori gastritis s/p eradication, chronic LUQ pain, epigastric swelling, asymptomatic COVID-19 infection, DLD. \par \par # headache with red-flag signs\par - will get MRI brain with contrast \par - neuro eval asap\par \par # GERD\par - refractory to PPI. \par - Patient will need pH manometry however patient is pending stress test for cardiology clearance.\par \par # chest pressure - likely due to GERD\par - however will need stress test/echo as per cardio\par \par # DLD\par - life style modification \par \par # internal hemorroids\par - confirmed by anoscopy. \par - continue to monitor \par \par # nummular eczema\par - stable\par \par f/u in 1 month\par \par \par

## 2021-10-03 ENCOUNTER — EMERGENCY (EMERGENCY)
Facility: HOSPITAL | Age: 36
LOS: 0 days | Discharge: HOME | End: 2021-10-03
Attending: EMERGENCY MEDICINE | Admitting: EMERGENCY MEDICINE
Payer: MEDICAID

## 2021-10-03 VITALS
DIASTOLIC BLOOD PRESSURE: 81 MMHG | OXYGEN SATURATION: 100 % | HEART RATE: 68 BPM | SYSTOLIC BLOOD PRESSURE: 123 MMHG | RESPIRATION RATE: 18 BRPM | TEMPERATURE: 99 F

## 2021-10-03 VITALS
HEART RATE: 72 BPM | RESPIRATION RATE: 18 BRPM | WEIGHT: 154.98 LBS | SYSTOLIC BLOOD PRESSURE: 112 MMHG | DIASTOLIC BLOOD PRESSURE: 74 MMHG | OXYGEN SATURATION: 100 % | TEMPERATURE: 98 F | HEIGHT: 66 IN

## 2021-10-03 DIAGNOSIS — R07.89 OTHER CHEST PAIN: ICD-10-CM

## 2021-10-03 DIAGNOSIS — R51.9 HEADACHE, UNSPECIFIED: ICD-10-CM

## 2021-10-03 DIAGNOSIS — Z87.19 PERSONAL HISTORY OF OTHER DISEASES OF THE DIGESTIVE SYSTEM: ICD-10-CM

## 2021-10-03 DIAGNOSIS — K21.9 GASTRO-ESOPHAGEAL REFLUX DISEASE WITHOUT ESOPHAGITIS: ICD-10-CM

## 2021-10-03 DIAGNOSIS — M54.6 PAIN IN THORACIC SPINE: ICD-10-CM

## 2021-10-03 LAB
ALBUMIN SERPL ELPH-MCNC: 4.6 G/DL — SIGNIFICANT CHANGE UP (ref 3.5–5.2)
ALP SERPL-CCNC: 80 U/L — SIGNIFICANT CHANGE UP (ref 30–115)
ALT FLD-CCNC: 25 U/L — SIGNIFICANT CHANGE UP (ref 0–41)
ANION GAP SERPL CALC-SCNC: 13 MMOL/L — SIGNIFICANT CHANGE UP (ref 7–14)
AST SERPL-CCNC: 22 U/L — SIGNIFICANT CHANGE UP (ref 0–41)
BASOPHILS # BLD AUTO: 0.04 K/UL — SIGNIFICANT CHANGE UP (ref 0–0.2)
BASOPHILS NFR BLD AUTO: 0.6 % — SIGNIFICANT CHANGE UP (ref 0–1)
BILIRUB SERPL-MCNC: 0.4 MG/DL — SIGNIFICANT CHANGE UP (ref 0.2–1.2)
BUN SERPL-MCNC: 14 MG/DL — SIGNIFICANT CHANGE UP (ref 10–20)
CALCIUM SERPL-MCNC: 9 MG/DL — SIGNIFICANT CHANGE UP (ref 8.5–10.1)
CHLORIDE SERPL-SCNC: 105 MMOL/L — SIGNIFICANT CHANGE UP (ref 98–110)
CO2 SERPL-SCNC: 23 MMOL/L — SIGNIFICANT CHANGE UP (ref 17–32)
CREAT SERPL-MCNC: 1.1 MG/DL — SIGNIFICANT CHANGE UP (ref 0.7–1.5)
EOSINOPHIL # BLD AUTO: 0.07 K/UL — SIGNIFICANT CHANGE UP (ref 0–0.7)
EOSINOPHIL NFR BLD AUTO: 1 % — SIGNIFICANT CHANGE UP (ref 0–8)
GLUCOSE SERPL-MCNC: 91 MG/DL — SIGNIFICANT CHANGE UP (ref 70–99)
HCT VFR BLD CALC: 43.9 % — SIGNIFICANT CHANGE UP (ref 42–52)
HGB BLD-MCNC: 14.6 G/DL — SIGNIFICANT CHANGE UP (ref 14–18)
IMM GRANULOCYTES NFR BLD AUTO: 0.1 % — SIGNIFICANT CHANGE UP (ref 0.1–0.3)
LYMPHOCYTES # BLD AUTO: 1.68 K/UL — SIGNIFICANT CHANGE UP (ref 1.2–3.4)
LYMPHOCYTES # BLD AUTO: 24.5 % — SIGNIFICANT CHANGE UP (ref 20.5–51.1)
MCHC RBC-ENTMCNC: 28.4 PG — SIGNIFICANT CHANGE UP (ref 27–31)
MCHC RBC-ENTMCNC: 33.3 G/DL — SIGNIFICANT CHANGE UP (ref 32–37)
MCV RBC AUTO: 85.4 FL — SIGNIFICANT CHANGE UP (ref 80–94)
MONOCYTES # BLD AUTO: 0.51 K/UL — SIGNIFICANT CHANGE UP (ref 0.1–0.6)
MONOCYTES NFR BLD AUTO: 7.4 % — SIGNIFICANT CHANGE UP (ref 1.7–9.3)
NEUTROPHILS # BLD AUTO: 4.55 K/UL — SIGNIFICANT CHANGE UP (ref 1.4–6.5)
NEUTROPHILS NFR BLD AUTO: 66.4 % — SIGNIFICANT CHANGE UP (ref 42.2–75.2)
NRBC # BLD: 0 /100 WBCS — SIGNIFICANT CHANGE UP (ref 0–0)
PLATELET # BLD AUTO: 238 K/UL — SIGNIFICANT CHANGE UP (ref 130–400)
POTASSIUM SERPL-MCNC: 4.7 MMOL/L — SIGNIFICANT CHANGE UP (ref 3.5–5)
POTASSIUM SERPL-SCNC: 4.7 MMOL/L — SIGNIFICANT CHANGE UP (ref 3.5–5)
PROT SERPL-MCNC: 7.4 G/DL — SIGNIFICANT CHANGE UP (ref 6–8)
RBC # BLD: 5.14 M/UL — SIGNIFICANT CHANGE UP (ref 4.7–6.1)
RBC # FLD: 13.1 % — SIGNIFICANT CHANGE UP (ref 11.5–14.5)
SODIUM SERPL-SCNC: 141 MMOL/L — SIGNIFICANT CHANGE UP (ref 135–146)
TROPONIN T SERPL-MCNC: <0.01 NG/ML — SIGNIFICANT CHANGE UP
WBC # BLD: 6.86 K/UL — SIGNIFICANT CHANGE UP (ref 4.8–10.8)
WBC # FLD AUTO: 6.86 K/UL — SIGNIFICANT CHANGE UP (ref 4.8–10.8)

## 2021-10-03 PROCEDURE — 71046 X-RAY EXAM CHEST 2 VIEWS: CPT | Mod: 26

## 2021-10-03 PROCEDURE — 99285 EMERGENCY DEPT VISIT HI MDM: CPT

## 2021-10-03 PROCEDURE — 93010 ELECTROCARDIOGRAM REPORT: CPT

## 2021-10-03 RX ORDER — IBUPROFEN 200 MG
600 TABLET ORAL ONCE
Refills: 0 | Status: COMPLETED | OUTPATIENT
Start: 2021-10-03 | End: 2021-10-03

## 2021-10-03 RX ADMIN — Medication 600 MILLIGRAM(S): at 20:43

## 2021-10-03 NOTE — ED ADULT TRIAGE NOTE - CHIEF COMPLAINT QUOTE
Pt c/o of chest tightness and L upper back pain. Pt sent from [facility], COVID PCR negative.

## 2021-10-03 NOTE — ED PROVIDER NOTE - CLINICAL SUMMARY MEDICAL DECISION MAKING FREE TEXT BOX
cp, back. msk. heart score low. PERC neg.  imaging and labs reviewed.  labs and imaging reviewed with pt. given good instructions when to return to ED and importance of f/u.  pt verbalized understanding. patient was given opportunity to ask questions.

## 2021-10-03 NOTE — ED PROVIDER NOTE - OBJECTIVE STATEMENT
36 year old male with hx of GERD, PUD, presents to ed with upper left sided back and chest pain x 2 days. Pain worse with movement. no sob, cough, fever, chills, calf pain or swelling, abd pain or nausea, vomiting, diarrhea. no recent travel. non smoker.

## 2021-10-03 NOTE — ED PROVIDER NOTE - ATTENDING CONTRIBUTION TO CARE
hx of GERD, PUD, recent visit to the ED for headache - found to have arachnoid cyst, followed with dr cabral, scheduled for MRI, no more headache. seen at a clinic, yest, given shot of toradol and ceftriaxone for "lung inf", today with upper left sided back and chest pain, worse with movt, and deep breath. no assoc n/v/diaph. no abd pain.   vss, nontoxic, well appearing, pink conj, anicteric, MMM, neck supple, CTAB, RRR, equal radial pulses bilat, abd soft/nt/nd, no cva tend. no calves tend, no edema, no fnd. no rashes.   a/p: labs, imaging, reassess

## 2021-10-03 NOTE — ED PROVIDER NOTE - PATIENT PORTAL LINK FT
You can access the FollowMyHealth Patient Portal offered by [facility] by registering at the following website: [email or web address]. By joining [employer]’s FollowMyHealth portal, you will also be able to view your health information using other applications (apps) compatible with our system.

## 2021-10-03 NOTE — ED PROVIDER NOTE - NSFOLLOWUPINSTRUCTIONS_ED_ALL_ED_FT
Chest Pain    Chest pain can be caused by many different conditions which may or may not be dangerous. Causes include heartburn, lung infections, heart attack, blood clot in lungs, skin infections, strain or damage to muscle, cartilage, or bones, etc. Lab tests or other studies including an electrocardiogram (EKG) may have been performed to find the cause of your pain. Make sure to follow up with a cardiologist or as instructed by your health care professional.    SEEK IMMEDIATE MEDICAL CARE IF YOU HAVE THE FOLLOWING SYMPTOMS: worsening chest pain, coughing up blood, unexplained back/neck/jaw pain, severe abdominal pain, dizziness or lightheadedness, shortness of breath, sweaty or clammy skin, vomiting, or racing heart beat. These symptoms may represent a serious problem that is an emergency. Do not wait to see if the symptoms will go away. Get medical help right away. Call your local emergency services (911 in the [place] Do not drive yourself to the hospital.     Back Pain    Back pain is very common in adults. The cause of back pain is rarely dangerous and the pain often gets better over time. The cause of your back pain may not be known and may include strain of muscles or ligaments, degeneration of the spinal disks, or arthritis. Occasionally the pain may radiate down your leg(s). Over-the-counter medicines to reduce pain and inflammation are often the most helpful. Stretching and remaining active frequently helps the healing process.     SEEK IMMEDIATE MEDICAL CARE IF YOU HAVE THE FOLLOWING SYMPTOMS: bowel or bladder control problems, unusual weakness or numbness in your arms or legs, nausea or vomiting, abdominal pain, fever, dizziness/lightheadedness.

## 2021-10-03 NOTE — ED PROVIDER NOTE - NS ED ROS FT
Review of Systems:  	•	CONSTITUTIONAL - no fever, no diaphoresis, no chills  	•	SKIN - no rash  	•	HEMATOLOGIC - no bleeding, no bruising  	•	EYES - no eye pain, no blurry vision  	•	ENT - no change in hearing, no sore throat, no ear pain or tinnitus  	•	RESPIRATORY - no shortness of breath, no cough  	•	CARDIAC - + chest pain, no palpitations  	•	GI - no abd pain, no nausea, no vomiting, no diarrhea, no constipation  	•	GENITO-URINARY - no discharge, no dysuria; no hematuria, no increased urinary frequency  	•	MUSCULOSKELETAL - + back pain, no swelling, no redness  	•	NEUROLOGIC - no weakness, no headache, no paresthesias, no LOC  	•	PSYCH - no anxiety, non suicidal, non homicidal, no hallucination, no depression

## 2021-10-03 NOTE — ED ADULT NURSE NOTE - NSIMPLEMENTINTERV_GEN_ALL_ED
Implemented All [place] Safety Interventions:  [place] to call system. Call bell, personal items and telephone within reach. Instruct patient to call for assistance. Room bathroom lighting operational. Non-slip footwear when patient is off stretcher. Physically safe environment: no spills, clutter or unnecessary equipment. Stretcher in lowest position, wheels locked, appropriate side rails in place.

## 2021-10-04 DIAGNOSIS — K21.9 GASTRO-ESOPHAGEAL REFLUX DISEASE WITHOUT ESOPHAGITIS: ICD-10-CM

## 2021-10-04 DIAGNOSIS — E78.5 HYPERLIPIDEMIA, UNSPECIFIED: ICD-10-CM

## 2021-10-04 DIAGNOSIS — R07.89 OTHER CHEST PAIN: ICD-10-CM

## 2021-10-04 DIAGNOSIS — R94.31 ABNORMAL ELECTROCARDIOGRAM [ECG] [EKG]: ICD-10-CM

## 2021-10-04 DIAGNOSIS — R10.13 EPIGASTRIC PAIN: ICD-10-CM

## 2021-10-04 DIAGNOSIS — R51.9 HEADACHE, UNSPECIFIED: ICD-10-CM

## 2021-10-04 DIAGNOSIS — L30.0 NUMMULAR DERMATITIS: ICD-10-CM

## 2021-10-07 ENCOUNTER — OUTPATIENT (OUTPATIENT)
Dept: OUTPATIENT SERVICES | Facility: HOSPITAL | Age: 36
LOS: 1 days | Discharge: HOME | End: 2021-10-07
Payer: SUBSIDIZED

## 2021-10-07 ENCOUNTER — RESULT REVIEW (OUTPATIENT)
Age: 36
End: 2021-10-07

## 2021-10-07 DIAGNOSIS — R51.9 HEADACHE, UNSPECIFIED: ICD-10-CM

## 2021-10-07 PROCEDURE — 70553 MRI BRAIN STEM W/O & W/DYE: CPT | Mod: 26

## 2021-10-13 ENCOUNTER — OUTPATIENT (OUTPATIENT)
Dept: OUTPATIENT SERVICES | Facility: HOSPITAL | Age: 36
LOS: 1 days | Discharge: HOME | End: 2021-10-13

## 2021-10-13 ENCOUNTER — APPOINTMENT (OUTPATIENT)
Dept: INTERNAL MEDICINE | Facility: CLINIC | Age: 36
End: 2021-10-13
Payer: COMMERCIAL

## 2021-10-13 VITALS
OXYGEN SATURATION: 99 % | SYSTOLIC BLOOD PRESSURE: 142 MMHG | TEMPERATURE: 97 F | HEIGHT: 62 IN | WEIGHT: 156 LBS | BODY MASS INDEX: 28.71 KG/M2 | DIASTOLIC BLOOD PRESSURE: 89 MMHG | HEART RATE: 72 BPM

## 2021-10-13 DIAGNOSIS — F41.9 ANXIETY DISORDER, UNSPECIFIED: ICD-10-CM

## 2021-10-13 PROCEDURE — 99213 OFFICE O/P EST LOW 20 MIN: CPT | Mod: GC

## 2021-10-13 RX ORDER — FAMOTIDINE 40 MG/1
40 TABLET, FILM COATED ORAL
Qty: 30 | Refills: 3 | Status: COMPLETED | COMMUNITY
Start: 2021-07-01 | End: 2021-10-13

## 2021-10-13 RX ORDER — SIMETHICONE 80 MG/1
80 TABLET, CHEWABLE ORAL
Qty: 120 | Refills: 2 | Status: COMPLETED | COMMUNITY
Start: 2021-05-19 | End: 2021-10-13

## 2021-10-13 RX ORDER — PANTOPRAZOLE 40 MG/1
40 TABLET, DELAYED RELEASE ORAL
Qty: 30 | Refills: 2 | Status: COMPLETED | COMMUNITY
Start: 2021-05-19 | End: 2021-10-13

## 2021-10-13 RX ORDER — PANTOPRAZOLE 40 MG/1
40 TABLET, DELAYED RELEASE ORAL
Qty: 30 | Refills: 3 | Status: COMPLETED | COMMUNITY
Start: 2021-07-01 | End: 2021-10-13

## 2021-10-13 NOTE — PHYSICAL EXAM
[No Acute Distress] : no acute distress [No JVD] : no jugular venous distention [No Respiratory Distress] : no respiratory distress  [Normal Rate] : normal rate  [No Edema] : there was no peripheral edema [Soft] : abdomen soft [Non Tender] : non-tender [No CVA Tenderness] : no CVA  tenderness [No Joint Swelling] : no joint swelling [Coordination Grossly Intact] : coordination grossly intact [No Focal Deficits] : no focal deficits [Normal Gait] : normal gait [Normal Posterior Cervical Nodes] : no posterior cervical lymphadenopathy [Normal Anterior Cervical Nodes] : no anterior cervical lymphadenopathy [Normal Affect] : the affect was normal [Normal Insight/Judgement] : insight and judgment were intact [de-identified] : Cn 2-12 intact, no cerebellar signs

## 2021-10-13 NOTE — HISTORY OF PRESENT ILLNESS
[FreeTextEntry1] : follow up [de-identified] : 36 year-old male with PMHx of nummular eczema, recurrent GERD, H. pylori gastritis s/p eradication, chronic LUQ pain, epigastric swelling, asymptomatic COVID-19 infection, DLD. \par Patient recently seen by GI for GERD symptoms refractory to PPI. Patient will need pH manometry however patient is pending stress test for cardiology clearance. \par \par Interval hx. \par Patient went to the ED on 9/30 for severe headache. Headache for 1 month duration. Patient was seen 12 days ago at clinic with the same complaint. Patient describes the headache as a throbbing pressure like pain. He states hit starts at the center of his head and radiates down to his back and his heart at this point he endorses feeling dizzy after the episode. Each episode lasts about 2-3hrs. Patient denies any aggravating or exacerbation factors.  describes Headache is almost constant in nature with 8-9/10 pain.  Patient states that he is currently having this headache at the time of the visit. He has presented to the ED multiple times as a result of this headache. This headache is different from any other headache he has had. Headache is not affected by light. Headache is worse with with sneezing. He has been waking up at night because of his headache. Patient endorses nausea but no vomiting.\par Patient is a . He denies any head injuries. \par Pain not resolved with advil or tylenol \par He reports 10lbs on unintentional weight loss over the past month due to loss of appetite. \par \par in the ED he had a Ct head: small arachnoid cysts, with a left vertex mild mass effect. MRI was done on 10/7/2021 and showed similar finding as was unremarkable \par \par

## 2021-10-18 ENCOUNTER — EMERGENCY (EMERGENCY)
Facility: HOSPITAL | Age: 36
LOS: 0 days | Discharge: HOME | End: 2021-10-18
Attending: EMERGENCY MEDICINE | Admitting: EMERGENCY MEDICINE
Payer: MEDICAID

## 2021-10-18 VITALS
HEART RATE: 82 BPM | RESPIRATION RATE: 18 BRPM | TEMPERATURE: 98 F | DIASTOLIC BLOOD PRESSURE: 74 MMHG | HEIGHT: 66 IN | SYSTOLIC BLOOD PRESSURE: 118 MMHG | OXYGEN SATURATION: 100 % | WEIGHT: 154.98 LBS

## 2021-10-18 DIAGNOSIS — K21.9 GASTRO-ESOPHAGEAL REFLUX DISEASE WITHOUT ESOPHAGITIS: ICD-10-CM

## 2021-10-18 DIAGNOSIS — M54.9 DORSALGIA, UNSPECIFIED: ICD-10-CM

## 2021-10-18 DIAGNOSIS — L30.0 NUMMULAR DERMATITIS: ICD-10-CM

## 2021-10-18 DIAGNOSIS — R00.2 PALPITATIONS: ICD-10-CM

## 2021-10-18 DIAGNOSIS — E78.5 HYPERLIPIDEMIA, UNSPECIFIED: ICD-10-CM

## 2021-10-18 DIAGNOSIS — R07.89 OTHER CHEST PAIN: ICD-10-CM

## 2021-10-18 DIAGNOSIS — R11.0 NAUSEA: ICD-10-CM

## 2021-10-18 DIAGNOSIS — F41.9 ANXIETY DISORDER, UNSPECIFIED: ICD-10-CM

## 2021-10-18 DIAGNOSIS — R51.9 HEADACHE, UNSPECIFIED: ICD-10-CM

## 2021-10-18 DIAGNOSIS — Z00.00 ENCOUNTER FOR GENERAL ADULT MEDICAL EXAMINATION WITHOUT ABNORMAL FINDINGS: ICD-10-CM

## 2021-10-18 LAB
ALBUMIN SERPL ELPH-MCNC: 5.1 G/DL — SIGNIFICANT CHANGE UP (ref 3.5–5.2)
ALP SERPL-CCNC: 93 U/L — SIGNIFICANT CHANGE UP (ref 30–115)
ALT FLD-CCNC: 38 U/L — SIGNIFICANT CHANGE UP (ref 0–41)
ANION GAP SERPL CALC-SCNC: 14 MMOL/L — SIGNIFICANT CHANGE UP (ref 7–14)
AST SERPL-CCNC: 41 U/L — SIGNIFICANT CHANGE UP (ref 0–41)
BASOPHILS # BLD AUTO: 0.04 K/UL — SIGNIFICANT CHANGE UP (ref 0–0.2)
BASOPHILS NFR BLD AUTO: 0.5 % — SIGNIFICANT CHANGE UP (ref 0–1)
BILIRUB SERPL-MCNC: 0.4 MG/DL — SIGNIFICANT CHANGE UP (ref 0.2–1.2)
BUN SERPL-MCNC: 12 MG/DL — SIGNIFICANT CHANGE UP (ref 10–20)
CALCIUM SERPL-MCNC: 9.7 MG/DL — SIGNIFICANT CHANGE UP (ref 8.5–10.1)
CHLORIDE SERPL-SCNC: 101 MMOL/L — SIGNIFICANT CHANGE UP (ref 98–110)
CO2 SERPL-SCNC: 26 MMOL/L — SIGNIFICANT CHANGE UP (ref 17–32)
CREAT SERPL-MCNC: 0.8 MG/DL — SIGNIFICANT CHANGE UP (ref 0.7–1.5)
EOSINOPHIL # BLD AUTO: 0.03 K/UL — SIGNIFICANT CHANGE UP (ref 0–0.7)
EOSINOPHIL NFR BLD AUTO: 0.4 % — SIGNIFICANT CHANGE UP (ref 0–8)
GLUCOSE SERPL-MCNC: 96 MG/DL — SIGNIFICANT CHANGE UP (ref 70–99)
HCT VFR BLD CALC: 48.9 % — SIGNIFICANT CHANGE UP (ref 42–52)
HGB BLD-MCNC: 16.5 G/DL — SIGNIFICANT CHANGE UP (ref 14–18)
IMM GRANULOCYTES NFR BLD AUTO: 0.3 % — SIGNIFICANT CHANGE UP (ref 0.1–0.3)
LYMPHOCYTES # BLD AUTO: 1.5 K/UL — SIGNIFICANT CHANGE UP (ref 1.2–3.4)
LYMPHOCYTES # BLD AUTO: 19.7 % — LOW (ref 20.5–51.1)
MCHC RBC-ENTMCNC: 28.5 PG — SIGNIFICANT CHANGE UP (ref 27–31)
MCHC RBC-ENTMCNC: 33.7 G/DL — SIGNIFICANT CHANGE UP (ref 32–37)
MCV RBC AUTO: 84.6 FL — SIGNIFICANT CHANGE UP (ref 80–94)
MONOCYTES # BLD AUTO: 0.37 K/UL — SIGNIFICANT CHANGE UP (ref 0.1–0.6)
MONOCYTES NFR BLD AUTO: 4.9 % — SIGNIFICANT CHANGE UP (ref 1.7–9.3)
NEUTROPHILS # BLD AUTO: 5.64 K/UL — SIGNIFICANT CHANGE UP (ref 1.4–6.5)
NEUTROPHILS NFR BLD AUTO: 74.2 % — SIGNIFICANT CHANGE UP (ref 42.2–75.2)
NRBC # BLD: 0 /100 WBCS — SIGNIFICANT CHANGE UP (ref 0–0)
PLATELET # BLD AUTO: 247 K/UL — SIGNIFICANT CHANGE UP (ref 130–400)
POTASSIUM SERPL-MCNC: 4.7 MMOL/L — SIGNIFICANT CHANGE UP (ref 3.5–5)
POTASSIUM SERPL-SCNC: 4.7 MMOL/L — SIGNIFICANT CHANGE UP (ref 3.5–5)
PROT SERPL-MCNC: 8.3 G/DL — HIGH (ref 6–8)
RBC # BLD: 5.78 M/UL — SIGNIFICANT CHANGE UP (ref 4.7–6.1)
RBC # FLD: 13.2 % — SIGNIFICANT CHANGE UP (ref 11.5–14.5)
SODIUM SERPL-SCNC: 141 MMOL/L — SIGNIFICANT CHANGE UP (ref 135–146)
TROPONIN T SERPL-MCNC: <0.01 NG/ML — SIGNIFICANT CHANGE UP
WBC # BLD: 7.6 K/UL — SIGNIFICANT CHANGE UP (ref 4.8–10.8)
WBC # FLD AUTO: 7.6 K/UL — SIGNIFICANT CHANGE UP (ref 4.8–10.8)

## 2021-10-18 PROCEDURE — 93010 ELECTROCARDIOGRAM REPORT: CPT

## 2021-10-18 PROCEDURE — 99285 EMERGENCY DEPT VISIT HI MDM: CPT

## 2021-10-18 PROCEDURE — 71045 X-RAY EXAM CHEST 1 VIEW: CPT | Mod: 26

## 2021-10-18 RX ORDER — KETOROLAC TROMETHAMINE 30 MG/ML
15 SYRINGE (ML) INJECTION ONCE
Refills: 0 | Status: DISCONTINUED | OUTPATIENT
Start: 2021-10-18 | End: 2021-10-18

## 2021-10-18 RX ORDER — METOCLOPRAMIDE HCL 10 MG
10 TABLET ORAL ONCE
Refills: 0 | Status: COMPLETED | OUTPATIENT
Start: 2021-10-18 | End: 2021-10-18

## 2021-10-18 RX ORDER — SODIUM CHLORIDE 9 MG/ML
1000 INJECTION, SOLUTION INTRAVENOUS ONCE
Refills: 0 | Status: COMPLETED | OUTPATIENT
Start: 2021-10-18 | End: 2021-10-18

## 2021-10-18 RX ADMIN — Medication 104 MILLIGRAM(S): at 17:20

## 2021-10-18 RX ADMIN — SODIUM CHLORIDE 1000 MILLILITER(S): 9 INJECTION, SOLUTION INTRAVENOUS at 17:20

## 2021-10-18 RX ADMIN — Medication 15 MILLIGRAM(S): at 17:20

## 2021-10-18 NOTE — ED PROVIDER NOTE - NSFOLLOWUPCLINICS_GEN_ALL_ED_FT
[facility]  Neurology  [address], [address]  [place] [address]  Phone: [phone]  Fax:   Follow Up Time: 1-3 Days    [facility]  Cardiology  [address], [address]  [place] [address]  Phone: [phone]  Fax: [phone]  Follow Up Time: 1-3 Days

## 2021-10-18 NOTE — ED PROVIDER NOTE - ATTENDING CONTRIBUTION TO CARE
36M PMH HL, p/w 1.5 months of HA. has had normal MRI 10/7, has appt neuro march 2022. has palptiations associated. has seen cards for this, has appt echo dec 2nd and stress dec 5th. no cp, sob. ha is diffuse constant pressure nonradiating. no numbness, weakness, tingling. no blurry vision, slurred speech. no cough, fever, uri sx.    on exam, AFVSS, well keila nad, ncat, eomi, perrla, mmm, lctab, rrr nl s1s2 no mrg, abd soft ntnd, aaox3, CN 2-12 intact, No nystagmus.  5/5 motor x 4 ext, SILT x 4 extremities, No facial droop or slurred speech. No pronator drift.  Normal rapid alternating movement and finger nose finger bilaterally. No midline C/T/L tenderness to palpation or step off. Normal gait, No ataxia.   no le edema or calf ttp,     a/p; HA/palp. nv intact. will do labs, ekg, IVF pain control re-eval.

## 2021-10-18 NOTE — ED PROVIDER NOTE - OBJECTIVE STATEMENT
37 yo male hx of hld presenting with 1.5 months of constant dull posterior headache associated with palpitations, chest pain, back pain, nausea. Has been worked-up extensively for headache including  CT/MRI that shows small arachnoid cysts. Pt reports the same symptoms as previous ED visits and has cardiology appointment next month and neurology appointment in december. denies vomiting, weakness, numbness, tingling, ataxia, fever, cough.

## 2021-10-18 NOTE — ED PROVIDER NOTE - PHYSICAL EXAMINATION
CONSTITUTIONAL: Well-developed; well-nourished; in no acute distress.   SKIN: warm, dry  HEAD: Normocephalic; atraumatic.  EYES: PERRL, EOMI, normal sclera and conjunctiva   ENT: No nasal discharge; airway clear.  NECK: Supple; non tender.  CARD: S1, S2 normal; no murmurs, gallops, or rubs. Regular rate and rhythm.   RESP: No wheezes, rales or rhonchi.  ABD: soft ntnd  EXT: Normal ROM.  No clubbing, cyanosis or edema.   LYMPH: No acute cervical adenopathy.  NEURO: Alert, oriented, grossly unremarkable. no pronator drift, normal strength and sensation b/l.   PSYCH: Cooperative, appropriate.

## 2021-10-18 NOTE — ED PROVIDER NOTE - NS ED ROS FT
Constitutional:  see HPI  Head:  headache  Eyes:  no visual changes; no eye pain, redness, or discharge  ENMT:  no ear pain or discharge; no hearing problems; no mouth or throat sores or lesions; no throat pain  Cardiac: chest pain  Respiratory: no cough, wheezing, shortness of breath, chest tightness, or trouble breathing  GI: no nausea, vomiting, diarrhea or abdominal pain  :  no dysuria, frequency, or burning with urination; no change in urine output  MS: no myalgias, muscle weakness, joint pain,or  injury; no joint swelling  Neuro: no weakness; no numbness or tingling; no seizure  Skin:  no rashes or color changes; no lacerations or abrasions

## 2021-10-18 NOTE — ED PROVIDER NOTE - PATIENT PORTAL LINK FT
You can access the FollowMyHealth Patient Portal offered by [facility] by registering at the following website: [email or web address]. By joining [employer]’s FollowMyHealth portal, you will also be able to view your health information using other applications (apps) compatible with our system.

## 2021-10-29 ENCOUNTER — APPOINTMENT (OUTPATIENT)
Dept: PSYCHIATRY | Facility: CLINIC | Age: 36
End: 2021-10-29

## 2021-10-29 ENCOUNTER — NON-APPOINTMENT (OUTPATIENT)
Age: 36
End: 2021-10-29

## 2021-10-29 ENCOUNTER — OUTPATIENT (OUTPATIENT)
Dept: OUTPATIENT SERVICES | Facility: HOSPITAL | Age: 36
LOS: 1 days | Discharge: HOME | End: 2021-10-29

## 2021-10-29 DIAGNOSIS — F41.9 ANXIETY DISORDER, UNSPECIFIED: ICD-10-CM

## 2021-10-29 DIAGNOSIS — F32.9 MAJOR DEPRESSIVE DISORDER, SINGLE EPISODE, UNSPECIFIED: ICD-10-CM

## 2021-11-02 ENCOUNTER — OUTPATIENT (OUTPATIENT)
Dept: OUTPATIENT SERVICES | Facility: HOSPITAL | Age: 36
LOS: 1 days | Discharge: HOME | End: 2021-11-02

## 2021-11-02 ENCOUNTER — LABORATORY RESULT (OUTPATIENT)
Age: 36
End: 2021-11-02

## 2021-11-02 DIAGNOSIS — Z11.59 ENCOUNTER FOR SCREENING FOR OTHER VIRAL DISEASES: ICD-10-CM

## 2021-11-05 ENCOUNTER — OUTPATIENT (OUTPATIENT)
Dept: OUTPATIENT SERVICES | Facility: HOSPITAL | Age: 36
LOS: 1 days | Discharge: HOME | End: 2021-11-05
Payer: SUBSIDIZED

## 2021-11-05 DIAGNOSIS — R07.89 OTHER CHEST PAIN: ICD-10-CM

## 2021-11-05 PROCEDURE — 93306 TTE W/DOPPLER COMPLETE: CPT | Mod: 26

## 2021-11-05 PROCEDURE — 93018 CV STRESS TEST I&R ONLY: CPT

## 2021-11-05 PROCEDURE — 93016 CV STRESS TEST SUPVJ ONLY: CPT

## 2021-11-16 ENCOUNTER — OUTPATIENT (OUTPATIENT)
Dept: OUTPATIENT SERVICES | Facility: HOSPITAL | Age: 36
LOS: 1 days | Discharge: HOME | End: 2021-11-16
Payer: SUBSIDIZED

## 2021-11-16 ENCOUNTER — APPOINTMENT (OUTPATIENT)
Dept: CARDIOLOGY | Facility: CLINIC | Age: 36
End: 2021-11-16
Payer: MEDICAID

## 2021-11-16 ENCOUNTER — NON-APPOINTMENT (OUTPATIENT)
Age: 36
End: 2021-11-16

## 2021-11-16 VITALS
HEIGHT: 62 IN | TEMPERATURE: 98.1 F | DIASTOLIC BLOOD PRESSURE: 76 MMHG | WEIGHT: 157 LBS | HEART RATE: 80 BPM | OXYGEN SATURATION: 98 % | BODY MASS INDEX: 28.89 KG/M2 | SYSTOLIC BLOOD PRESSURE: 113 MMHG

## 2021-11-16 PROCEDURE — 99214 OFFICE O/P EST MOD 30 MIN: CPT

## 2021-11-16 PROCEDURE — 93010 ELECTROCARDIOGRAM REPORT: CPT

## 2021-11-16 RX ORDER — METHOCARBAMOL 500 MG/1
500 TABLET, FILM COATED ORAL 3 TIMES DAILY
Qty: 30 | Refills: 0 | Status: DISCONTINUED | COMMUNITY
Start: 2021-10-13 | End: 2021-11-16

## 2021-11-16 NOTE — ASSESSMENT
[FreeTextEntry1] : 36 year-old male with PMHx of nummular eczema, GERD, H. pylori gastritis s/p eradication, chronic LUQ pain, epigastric swelling, asymptomatic COVID-19 infection presents for follow up after stress test\par \par # Atypical chest pain overall sounds like anxiety \par - EKG here shows NSR no T wave inversions / exercise stress test was normal\par - echo EF 68% no valvular abnormalities\par - will get cardiac CT, toprol 25 starting 4 days before\par \par # Palpitations\par - states he gets palpitations and feels lightheaded during these times / these episodes are about 6 times a day he states and lasts a few minutes at a time\par - likely component of anxiety\par - will get holter monitor for three days

## 2021-11-16 NOTE — REASON FOR VISIT
[FreeTextEntry1] : 36 year-old male with PMHx of nummular eczema, GERD, H. pylori gastritis s/p eradication, chronic LUQ pain, epigastric swelling, asymptomatic COVID-19 infection presents for follow up after stress test.  Patietn still describes atypical chest pain that he states starts in his head and goes down to his lungs and then arms.  His stress test was normal, echo showed EF 68%.  Pain is not associated with exercise.  However he states he gets palpitations with mild exertion.

## 2021-11-16 NOTE — REVIEW OF SYSTEMS
[Palpitations] : palpitations [Anxiety] : anxiety [Negative] : Heme/Lymph [FreeTextEntry5] : atypical chest pain starting at his head and going to his lungs he states

## 2021-11-22 ENCOUNTER — OUTPATIENT (OUTPATIENT)
Dept: OUTPATIENT SERVICES | Facility: HOSPITAL | Age: 36
LOS: 1 days | Discharge: HOME | End: 2021-11-22

## 2021-11-22 ENCOUNTER — APPOINTMENT (OUTPATIENT)
Dept: PSYCHIATRY | Facility: CLINIC | Age: 36
End: 2021-11-22
Payer: COMMERCIAL

## 2021-11-22 DIAGNOSIS — F41.0 PANIC DISORDER [EPISODIC PAROXYSMAL ANXIETY]: ICD-10-CM

## 2021-11-22 PROCEDURE — 90792 PSYCH DIAG EVAL W/MED SRVCS: CPT

## 2021-12-02 NOTE — ASU PREOP CHECKLIST - WEIGHT IN KG
OFFICE VISIT      Patient: Bernardo Friedman Date of Service: 2021   : 1961 MRN: [id number]     History of Present Illness     HPI   Pt here for re-establishing care and general medical exam.     Bernardo Friedman is a 63year old male who presents today for   Chief Complaint   Patient presents with   â¢ Office Visit   â¢ Other     congested x 3 months , Body pain   â¢ Knee Pain     Lifestyle:   Work/Occupation/Schooling: works in [employer]- works in [place], flies to [place] every 2 weeks where is   Family: Spouse lives in [place], has adult children, 2 grandchildren in [place] is 10 yo and Denece Chen is 3year old   Diet: fair   BMI: Body mass index is 38.77 kg/mÂ². Exercise: no- not able to exercise 2/2 body pain per pt. Sleep: not sleeping well   Relationship:   Intimate Partner Violence: Denies  History of STIs: No   Concerns: none    Health Maintenance:  Colon Cancer Screening:  Family H/O Colon CA: none per pt   Last Colonoscopy: 2018 not due currently  performed by [facility] -- only notable for 3 benign polyps   Screening Colonoscopy: completed   GI Symptoms: No  No New Studies. Prostate Cancer Screening:  FHX:  reports  PMH: reports  Last PSA:   Lab Results   Component Value Date    PSA <0.01 2019       Osteoporosis screening: not indicated    Lung Cancer Screening (age 46-80): indicated  smoking h/o: Cigarettes - 1/2 package per day    AAA Screening (age 72 to 76): not indicated    Hepatitis C Screening:  not indicated  Lab Results   Component Value Date    HCV NEGATIVE 2014       Immunization:  Immunization History   Administered Date(s) Administered   â¢ Influenza, Unspecified Formulation 10/31/2018   â¢ Influenza, injectable, quadrivalent, preservative-free 10/16/2017, 2018   â¢ Influenza, seasonal, injectable, preservative free 10/10/2014     Immunizations Reviewed: up to date per record.   FLU: received   Td/Tdap: due for TDPA  HPV vaccine: -  Pneumovax: -  Prevnar: -  Zostavax: received dose at today's visit   COVID-19 vaccine: vaccinated     Chronic Care:    Hypertension  - high persistenty  - takes it at home usually 1x/ month and it is always latanya  - elevated in the office today x 2      Depression/anxiety   - therapist in 2018 - has not seen anyone since   - has tried celexa- did not feel that it helped   - on welbutrin and quetiapine doesn't feel it makes any difference. - Concentrate, lethargy, sleeps poorly (6-7 hours)   - hx of panic attack- gets really anxious, difficulty breathing, things closing in on him. - denies SI/HI    Smoking   - maybe 5 cigarettes / day   - has decreased overall smoking. Lights a cigarette but then will put it out and continue smoking same cigarette throughout the day     Alcohol use -   - Every day or every other day   - 2-3 drinks Whiskey or Vodka   - states usually drinks alone, denies any eye-openers, not impacting his daily life       Hx of prostate cancer - s/p radiation and chemo   - stopped medications within the last year - insurance kicked him off zytiga  - on luprin every 3 months. - due for follow up w/ urologist     EDISON  - uses CPAP every night     Acute Concerns: Yes    L knee pain   - continually getting worse over the last 3 years   -  muscles spasms in feet and in ankles  - has had injection in the L knee but did not think it helped   - feels that this has been impacting his ability to get up and move around, impact his ability to exercise. Tries to walk but has a hard time with his knee   - has a total gym but gets pain in his shoulder and arms.       Congestion-   - Severe congestion for quite some time   - uses a saline nasal spray which helps for 5 mins    then started using afrin and has been using it 3 times a day for the past 5 months   - flys a lot between [place]   - has severe pain and pressure whenever he is on a plane ( every 2-3 weeks)  - takes zyrtec daily which does not help either   - denies any frontal headache     Polyuria   - notes that he has been more frequent urination, specifically at night   - no difficulty getting urine out       Health Maintenance Summary     DTaP/Tdap/Td Vaccine (2 - Td or Tdap)  Overdue since 12/22/2020    COVID-19 Vaccine (3 - Moderna risk 4-dose series)  Overdue since 6/1/2021    Shingles Vaccine (2 of 2)  Next due on 1/27/2022    Pneumococcal Vaccine 0-64 (2 of 4 - PPSV23)  Next due on 2/1/2022    Colorectal Cancer Screen- (Colonoscopy - Every 10 Years)  Next due on 1/31/2023    Hepatitis C Screening   Completed    Influenza Vaccine   Completed    Hepatitis B Vaccine   Aged Out    Meningococcal Vaccine   Aged Out    HPV Vaccine   Aged Out          Social History:  Social History     Tobacco Use   â¢ Smoking status: Current Every Day Smoker     Packs/day: 0.50   â¢ Smokeless tobacco: Never Used   Substance Use Topics   â¢ Alcohol use: Yes     Comment: socaily   â¢ Drug use: No       PAST MEDICAL HISTORY:  Past Medical History:   Diagnosis Date   â¢ Anxiety    â¢ Arthritis    â¢ Depressive disorder    â¢ Elevated PSA 8/20/2014   â¢ Essential (primary) hypertension    â¢ Malignant neoplasm (CMS/HCC)    â¢ No known problems        MEDICATIONS:  Current Outpatient Medications   Medication Sig   â¢ amLODIPine (NORVASC) 10 MG tablet Take 1 tablet by mouth daily. â¢ citalopram (CeleXA) 20 MG tablet Take 1 tablet by mouth daily. take one tablet by mouth one time daily   â¢ atenolol (TENORMIN) 100 MG tablet Take 1 tablet by mouth daily. â¢ atorvastatin (LIPITOR) 80 MG tablet Take 1 tablet by mouth daily. â¢ buPROPion XL (WELLBUTRIN XL) 150 MG 24 hr tablet Take 1 tablet by mouth daily. â¢ losartan (COZAAR) 100 MG tablet Take 1 tablet by mouth daily. â¢ montelukast (SINGULAIR) 10 MG tablet Take 1 tablet by mouth at bedtime as needed (allergies). â¢ QUEtiapine (SEROquel) 25 MG tablet Take 1 tablet by mouth at bedtime.    â¢ tamsulosin (FLOMAX) 0.4 MG Cap Take 1 capsule by mouth daily after a meal.   â¢ amoxicillin-clavulanate (Augmentin) 875-125 MG per tablet Take 1 tablet by mouth every 12 hours. Take with food. â¢ clotrimazole-betamethasone (LOTRISONE) 1-0.05 % cream APPLY TO AFFECTED AREA TWICE A DAY   â¢ albuterol-ipratropium (COMBIVENT RESPIMAT) 100-20 MCG/ACT inhaler Inhale 1 puff into the lungs four times daily. â¢ Abiraterone Acetate (ZYTIGA) 500 MG Tab Take 1,000 mg by mouth daily. â¢ cyclobenzaprine (FLEXERIL) 5 MG tablet Take 1 tablet by mouth at bedtime as needed (left knee pain). â¢ guaiFENesin (MUCINEX) 600 MG 12 hr tablet Take 1 tablet by mouth 2 times daily. â¢ Multiple Vitamins-Minerals (MULTIVITAMIN ADULTS PO)    â¢ econazole (SPECTAZOLE) 1 % cream AB,TWO TIMES A DAY TO AFECTED AREA   â¢ varenicline (CHANTIX STARTING MONTH PAK) 0.5 MG X 11 & 1 MG X 42 tablet Use as directed     No current facility-administered medications for this visit. ALLERGIES:  ALLERGIES:   Allergen Reactions   â¢ Penicillins Other (See Comments)     Unknown       PAST SURGICAL HISTORY:  Past Surgical History:   Procedure Laterality Date   â¢ Total knee replacement Right    â¢ Total knee replacement         FAMILY HISTORY:  Family History   Problem Relation Age of Onset   â¢ Diabetes Mother    â¢ Cancer Father    â¢ Heart disease Maternal Grandmother        Patient's medications, allergies, past medical, surgical, social and family histories were reviewed and updated as appropriate. Review of Systems   HENT: Positive for postnasal drip and rhinorrhea. Eyes: Negative. Respiratory: Negative for shortness of breath. Endocrine: Positive for polydipsia and polyuria. Musculoskeletal: Positive for arthralgias. Psychiatric/Behavioral: Positive for dysphoric mood and sleep disturbance. Negative for self-injury and suicidal ideas. The patient is nervous/anxious. All other systems reviewed and are negative.     All other ROS reviewed negative except as mentioned in HPI     OBJECTIVE:     Physical Exam  Constitutional:       Appearance: He is obese. HENT:      Head: Normocephalic. Right Ear: External ear normal.      Left Ear: External ear normal.   Eyes:      Pupils: Pupils are equal, round, and reactive to light. Cardiovascular:      Rate and Rhythm: Normal rate. Pulses: Normal pulses. Pulmonary:      Effort: Pulmonary effort is normal.      Breath sounds: Normal breath sounds. Abdominal:      Palpations: Abdomen is soft. Tenderness: There is no abdominal tenderness. There is no right CVA tenderness or left CVA tenderness. Musculoskeletal:         General: Normal range of motion. Cervical back: Normal range of motion. Skin:     General: Skin is warm. Capillary Refill: Capillary refill takes less than 2 seconds. Neurological:      General: No focal deficit present. Mental Status: He is alert. Psychiatric:         Mood and Affect: Mood normal.       Visit Vitals  BP (!) 160/101 (BP Location: RUE - Right upper extremity, Patient Position: Sitting)   Pulse 86   Temp 96.5 Â°F (35.8 Â°C) (Temporal)   Wt 115.7 kg (255 lb)   BMI 38.77 kg/mÂ²       Nursing Notes Reviewed     DIAGNOSTIC STUDIES:     LAB RESULTS: Lab Results Reviewed    ASSESSMENT / PLAN:     Problem List Items Addressed This Visit        Cardiac and Vasculature    Essential hypertension  - BP very high today. 160/110 BP today on repeat pressure. Pt is asymptomatic  - Per pt has been very persistently elevated even at home   - will consider adding on second agent following labs results - may need to add hydrochlorothiazide   - continue amlodipine 10 mg qd, atenolol 100 mg qd, losartan 100 mg qd     Hyperlipidemia   - continue atorvstatin 80 mg qd   - pt inquiring if the statin is causing polyarthropathy, discussed with pt that would need to address BP and other health conditions prior to polyarthropathy, although low suspicion that the statin is causing his joint/muscle pain.         ENT    Chronic sinusitis Relevant Orders    SERVICE TO ENT  - has been having chronic rhinorrhea/sinusitis  - has been using afrin TID. Advised this should be discontinued as is likely worsening symptoms. Advised he will feel worsening of symptoms following reduction in afrin use. - continue montelukast at bedtime. - referral to ENT placed. Genitourinary and Reproductive    Frequency of urination and polyuria    Relevant Orders    URINALYSIS & REFLEX MICROSCOPY (Completed)    MICROALBUMIN URINE RANDOM (Completed)    POCT URINE DIP AUTO (Completed)  - no protein on urine.   - concern for new onset diabetes given polyuria, and weight gain. Will obtain UA with reflex, glycohemoglobin A1C and CMP. - consideration for possible prostate involvement contributing to polyuria. Pt reports he has stopped taking his zytiga as insurance no longer covers it   - continue tamsulosin 0.4 mg qd        Health Encounters    Encounter for general adult medical examination w/o abnormal findings - Primary    Relevant Orders    CBC WITH DIFFERENTIAL (Completed)    COMPREHENSIVE METABOLIC PANEL (Completed)    GLYCOHEMOGLOBIN (Completed)    LIPID PANEL WITH REFLEX (Completed)  - pt due for baseline labs, CT chest screening for lung cancer ordered. Up to date on colonoscopy. No other screenings needed        Hematology and Neoplasia    Prostate cancer (CMS/HCC)  - has stopped taking zytiga as no longer covered by insurance   - still taking quarterly medication (unsure name) by urology, due for follow up with uro   - most recent PSA < 1 month ago was normal.  - s/p chemo/radiation       Mental Health    Alcohol use disorder, mild, abuse    Major depressive disorder with current active episode    Relevant Orders    SERVICE TO BEHAVIORAL HEALTH  - poorly controlled mood despite bupropion and quetiapine. - denies any SI/HI, safe at this time.  Advised to follow up with psychiatrist as I am not comfortably adjusting medications given on antipsychotic for tx resistant depression. Unclear if seroquel has made any changes to mood, pt does not think it has helped. Still has difficulty falling asleep despite starting seroquel.  - recommend re-establishing care with therapist        Musculoskeletal and Injuries    Left knee pain  - high suspicion for osteoarthritis. Has hx of R TKA >10 years ago. Notes that his left knee pain is now causing him daily pain and making daily living activities difficult  - bilateral knee xrays previously ordered are pending from another provider. Advised to have knee xrays completed and can consider steroid injection prior to knee replacement with ortho if indicated  - states he has had steroid injections in the past it has been a long time since last injection. Unclear if it helped       Sleep    EDISON (obstructive sleep apnea)  - uses CPAP machine nightly. Advised to continue nightly use. Tobacco    Current smoker    Relevant Orders    CT LUNG CANCER SCREENING LOW DOSE WO CONTRAST  - current every day smoker- smokers only 5 cigarettes a day, has been slowly cutting back use   - continue chantix. Immunizations recommendations reviewed   Cancer Screening Recommendations Reviewed: lung, colon, prostate   Refer to orders  Preventative diet & exercise related lifestyle changes discussed and recommended  Weight goals reviewed & discussed    Instructions provided as documented in the AVS.    The patient indicated understanding of the diagnosis and agreed with the plan of care. Red flag s/s reviewed and discussed for conditions listed including concerns for prompt ED evaluation  Medical compliance with plan discussed and risks of non-compliance reviewed  Education completed on disease process, etiology & prognosis  Proper usage and side effects of medications reviewed & discussed    Return to clinic as clinically indicated or as discussed     Return in about 1 week (around 12/9/2021) for next available w Dr Tamara Daugherty.     Patient discussed with Dr. Elena Pacheco.     Andie Colin MD  Family Medicine PGY-1 71.7

## 2021-12-06 ENCOUNTER — APPOINTMENT (OUTPATIENT)
Dept: PSYCHIATRY | Facility: CLINIC | Age: 36
End: 2021-12-06

## 2021-12-21 ENCOUNTER — APPOINTMENT (OUTPATIENT)
Dept: PSYCHIATRY | Facility: CLINIC | Age: 36
End: 2021-12-21

## 2021-12-26 ENCOUNTER — OUTPATIENT (OUTPATIENT)
Dept: OUTPATIENT SERVICES | Facility: HOSPITAL | Age: 36
LOS: 1 days | Discharge: HOME | End: 2021-12-26
Payer: SUBSIDIZED

## 2021-12-26 DIAGNOSIS — R51.9 HEADACHE, UNSPECIFIED: ICD-10-CM

## 2021-12-26 PROCEDURE — 70545 MR ANGIOGRAPHY HEAD W/DYE: CPT | Mod: 26

## 2021-12-28 ENCOUNTER — APPOINTMENT (OUTPATIENT)
Dept: PSYCHIATRY | Facility: CLINIC | Age: 36
End: 2021-12-28

## 2021-12-30 ENCOUNTER — OUTPATIENT (OUTPATIENT)
Dept: OUTPATIENT SERVICES | Facility: HOSPITAL | Age: 36
LOS: 1 days | Discharge: HOME | End: 2021-12-30

## 2021-12-30 ENCOUNTER — APPOINTMENT (OUTPATIENT)
Dept: PSYCHIATRY | Facility: CLINIC | Age: 36
End: 2021-12-30
Payer: COMMERCIAL

## 2021-12-30 DIAGNOSIS — F41.0 PANIC DISORDER [EPISODIC PAROXYSMAL ANXIETY]: ICD-10-CM

## 2021-12-30 PROCEDURE — 99213 OFFICE O/P EST LOW 20 MIN: CPT | Mod: 95

## 2022-01-20 ENCOUNTER — RESULT REVIEW (OUTPATIENT)
Age: 37
End: 2022-01-20

## 2022-01-20 ENCOUNTER — OUTPATIENT (OUTPATIENT)
Dept: OUTPATIENT SERVICES | Facility: HOSPITAL | Age: 37
LOS: 1 days | Discharge: HOME | End: 2022-01-20
Payer: SUBSIDIZED

## 2022-01-20 DIAGNOSIS — R07.89 OTHER CHEST PAIN: ICD-10-CM

## 2022-01-20 PROCEDURE — 75574 CT ANGIO HRT W/3D IMAGE: CPT | Mod: 26

## 2022-02-24 ENCOUNTER — OUTPATIENT (OUTPATIENT)
Dept: OUTPATIENT SERVICES | Facility: HOSPITAL | Age: 37
LOS: 1 days | Discharge: HOME | End: 2022-02-24

## 2022-02-24 ENCOUNTER — APPOINTMENT (OUTPATIENT)
Dept: PSYCHIATRY | Facility: CLINIC | Age: 37
End: 2022-02-24
Payer: COMMERCIAL

## 2022-02-24 DIAGNOSIS — G47.00 INSOMNIA, UNSPECIFIED: ICD-10-CM

## 2022-02-24 DIAGNOSIS — F41.0 PANIC DISORDER [EPISODIC PAROXYSMAL ANXIETY]: ICD-10-CM

## 2022-02-24 DIAGNOSIS — F32.A DEPRESSION, UNSPECIFIED: ICD-10-CM

## 2022-02-24 PROCEDURE — 99213 OFFICE O/P EST LOW 20 MIN: CPT | Mod: 95

## 2022-03-01 ENCOUNTER — APPOINTMENT (OUTPATIENT)
Dept: NEUROLOGY | Facility: CLINIC | Age: 37
End: 2022-03-01

## 2022-03-01 ENCOUNTER — APPOINTMENT (OUTPATIENT)
Dept: DERMATOLOGY | Facility: CLINIC | Age: 37
End: 2022-03-01

## 2022-03-22 ENCOUNTER — APPOINTMENT (OUTPATIENT)
Dept: INTERNAL MEDICINE | Facility: CLINIC | Age: 37
End: 2022-03-22

## 2022-04-19 ENCOUNTER — APPOINTMENT (OUTPATIENT)
Dept: NEUROLOGY | Facility: CLINIC | Age: 37
End: 2022-04-19

## 2022-04-21 ENCOUNTER — APPOINTMENT (OUTPATIENT)
Dept: PSYCHIATRY | Facility: CLINIC | Age: 37
End: 2022-04-21

## 2022-05-17 ENCOUNTER — OUTPATIENT (OUTPATIENT)
Dept: OUTPATIENT SERVICES | Facility: HOSPITAL | Age: 37
LOS: 1 days | Discharge: HOME | End: 2022-05-17
Payer: SUBSIDIZED

## 2022-05-17 ENCOUNTER — APPOINTMENT (OUTPATIENT)
Dept: CARDIOLOGY | Facility: CLINIC | Age: 37
End: 2022-05-17
Payer: SUBSIDIZED

## 2022-05-17 ENCOUNTER — NON-APPOINTMENT (OUTPATIENT)
Age: 37
End: 2022-05-17

## 2022-05-17 VITALS
BODY MASS INDEX: 29.44 KG/M2 | OXYGEN SATURATION: 97 % | HEIGHT: 62 IN | WEIGHT: 160 LBS | TEMPERATURE: 97.7 F | SYSTOLIC BLOOD PRESSURE: 119 MMHG | HEART RATE: 71 BPM | DIASTOLIC BLOOD PRESSURE: 78 MMHG

## 2022-05-17 VITALS
OXYGEN SATURATION: 97 % | DIASTOLIC BLOOD PRESSURE: 78 MMHG | HEART RATE: 71 BPM | HEIGHT: 62 IN | BODY MASS INDEX: 29.44 KG/M2 | TEMPERATURE: 97.7 F | SYSTOLIC BLOOD PRESSURE: 119 MMHG | WEIGHT: 160 LBS

## 2022-05-17 DIAGNOSIS — R00.2 PALPITATIONS: ICD-10-CM

## 2022-05-17 DIAGNOSIS — R07.89 OTHER CHEST PAIN: ICD-10-CM

## 2022-05-17 DIAGNOSIS — K21.9 GASTRO-ESOPHAGEAL REFLUX DISEASE WITHOUT ESOPHAGITIS: ICD-10-CM

## 2022-05-17 PROCEDURE — 99213 OFFICE O/P EST LOW 20 MIN: CPT

## 2022-05-17 PROCEDURE — 93010 ELECTROCARDIOGRAM REPORT: CPT

## 2022-05-17 NOTE — PHYSICAL EXAM
[Well Developed] : well developed [No Acute Distress] : no acute distress [Normal Venous Pressure] : normal venous pressure [No Carotid Bruit] : no carotid bruit [Normal S1, S2] : normal S1, S2 [No Murmur] : no murmur [No Rub] : no rub [No Gallop] : no gallop [Clear Lung Fields] : clear lung fields [Good Air Entry] : good air entry [No Respiratory Distress] : no respiratory distress  [Soft] : abdomen soft [No Edema] : no edema [Normal] : moves all extremities, no focal deficits, normal speech [Alert and Oriented] : alert and oriented

## 2022-05-19 NOTE — REVIEW OF SYSTEMS
[Chest Discomfort] : chest discomfort [Palpitations] : palpitations [Heartburn] : heartburn [Negative] : Neurological [SOB] : no shortness of breath [Dyspnea on exertion] : not dyspnea during exertion [Lower Ext Edema] : no extremity edema [Leg Claudication] : no intermittent leg claudication [Cough] : no cough [Wheezing] : no wheezing [Coughing Up Blood] : no hemoptysis [Abdominal Pain] : no abdominal pain [Nausea] : no nausea [Vomiting] : no vomiting [Change in Appetite] : no change in appetite

## 2022-05-19 NOTE — HISTORY OF PRESENT ILLNESS
[FreeTextEntry1] : 36 year-old male with PMHx of nummular eczema, GERD, H. pylori gastritis s/p eradication, chronic LUQ pain, epigastric swelling, asymptomatic COVID-19 infection presents for test follow up. The patient was seen here for chest pain and palpitations. Chest pain is burning and he saw a GI doctor who told him he needed to have EGD but asked for him to see cardiology first for the palpitations. He gets palpitations when he exercises. No syncope, dizziness, SOB, cardiac chest pain.\par \par He had CTA with CAC which were normal. 3 day holter showed only sinus tachycardia up to 120s. TTE normal.

## 2022-05-19 NOTE — ASSESSMENT
[FreeTextEntry1] : 36 year-old male with PMHx of nummular eczema, GERD, H. pylori gastritis s/p eradication, chronic LUQ pain, epigastric swelling, asymptomatic COVID-19 infection presents for test follow up. \par \par #Chest pain\par - CTA normal, CAC 0\par - TTE normal EF, no significant valvular dz\par - Non-cardiac chest pain. Recommend f/u with GI as he describes pain as palpitations\par \par #Palpitations\par - 3 day holter with sinus tach up to 120s, no concerning rhythm\par - Patient-reported symptoms not associated with arrhythmias on holter\par - Likely multifactorial; anxiety + poor exercise tolerance. No indication to start meds\par \par Recommend f/u with PMD and GI\par RTC PRN

## 2022-06-01 ENCOUNTER — APPOINTMENT (OUTPATIENT)
Dept: SURGERY | Facility: CLINIC | Age: 37
End: 2022-06-01

## 2022-09-22 ENCOUNTER — EMERGENCY (EMERGENCY)
Facility: HOSPITAL | Age: 37
LOS: 0 days | Discharge: HOME | End: 2022-09-22
Attending: EMERGENCY MEDICINE | Admitting: EMERGENCY MEDICINE

## 2022-09-22 VITALS
TEMPERATURE: 98 F | WEIGHT: 158.07 LBS | DIASTOLIC BLOOD PRESSURE: 78 MMHG | HEART RATE: 74 BPM | OXYGEN SATURATION: 96 % | SYSTOLIC BLOOD PRESSURE: 110 MMHG | RESPIRATION RATE: 16 BRPM | HEIGHT: 66 IN

## 2022-09-22 DIAGNOSIS — H57.89 OTHER SPECIFIED DISORDERS OF EYE AND ADNEXA: ICD-10-CM

## 2022-09-22 DIAGNOSIS — H00.016 HORDEOLUM EXTERNUM LEFT EYE, UNSPECIFIED EYELID: ICD-10-CM

## 2022-09-22 PROCEDURE — 99283 EMERGENCY DEPT VISIT LOW MDM: CPT

## 2022-09-22 NOTE — ED PROVIDER NOTE - PHYSICAL EXAMINATION
CONSTITUTIONAL: Well-developed; well-nourished; in no acute distress, nontoxic appearing  SKIN: skin exam is warm and dry,  HEAD: Normocephalic; atraumatic.  EYES: +hordeolum L eyelid, no drainage, +erythema/warmth.  PERRL, 3 mm bilateral, no nystagmus, EOM intact; conjunctiva and sclera clear.  ENT: MMM  EXT: Normal ROM.   NEURO: awake, alert, following commands, oriented, grossly unremarkable. No Focal deficits. GCS 15.   PSYCH: Cooperative, appropriate.

## 2022-09-22 NOTE — ED PROVIDER NOTE - ATTENDING APP SHARED VISIT CONTRIBUTION OF CARE
37-year-old male presents to ED for redness and swelling for the past 1 week.  Patient states has been progressively getting worse.  He used to, compress twice but nothing else.  Patient states that he is now having worsening swelling and pain.  Fever no chills no pain with movement of eyes.    Const:NAD  Eyes: PERRL, no conjunctival injection, pt has swelling to L lower lid with surrounding erythema. normal EOM without pain. no exophthalmos.   HENT:  Neck supple without meningismus   CV: RRR, Warm, well-perfused extremities  RESP: CTA B/L, no tachypnea   MSK: No gross deformities appreciated  Skin: Warm, dry. No rashes  Neuro: Alert, CNs II-XII grossly intact. Sensation and motor function of extremities grossly intact.  Psych: Appropriate mood and affect.    pt has hordeolum

## 2022-09-22 NOTE — ED PROVIDER NOTE - OBJECTIVE STATEMENT
37 year old male, no past medical history, who presents with left eye redness. 37 year old male, no past medical history, who presents with left eye redness. patient with swelling under l eyelid that progressively worsened x1 week. patient with warm compresses with minimal improvement. no drainage/bleeding. no vision changes, eye redness, headache.

## 2022-09-22 NOTE — ED PROVIDER NOTE - NS ED ROS FT
Review of Systems:  	•	CONSTITUTIONAL: no fever, no diaphoresis, no chills  	•	SKIN: +L lower eyelid redness/swelling, no drainage   	•	EYES: no eye redness, no blurry vision   	•	GI: no nausea, no vomiting   	•	NEUROLOGIC: no headache   	•	PSYCH: no anxiety, non suicidal, non homicidal, no hallucination, no depression

## 2022-09-22 NOTE — ED PROVIDER NOTE - PATIENT PORTAL LINK FT
You can access the FollowMyHealth Patient Portal offered by [facility] by registering at the following website: [email or web address]. By joining [facility] FollowMyHealth portal, you will also be able to view your health information using other applications (apps) compatible with our system.

## 2022-09-22 NOTE — ED PROVIDER NOTE - NSFOLLOWUPCLINICS_GEN_ALL_ED_FT
[facility]  Ophthalmolgy  [address], [address]  [place] [address]  Phone: [phone]  Fax:   Follow Up Time: 1-3 Days

## 2022-09-22 NOTE — ED PROVIDER NOTE - CLINICAL SUMMARY MEDICAL DECISION MAKING FREE TEXT BOX
37-year-old male presents ED for eye swelling due to hordeolum.  Due to the surrounding erythema.  Patient on antibiotics to treat for preseptal cellulitis.  Strict return precautions given. dc home

## 2022-09-22 NOTE — ED PROVIDER NOTE - NSFOLLOWUPINSTRUCTIONS_ED_ALL_ED_FT
Stye  A stye, also known as a hordeolum, is a bump that forms on an eyelid. It may look like a pimple next to the eyelash. A stye can form inside the eyelid (internal stye) or outside the eyelid (external stye). A stye can cause redness, swelling, and pain on the eyelid.    Styes are very common. Anyone can get them at any age. They usually occur in just one eye, but you may have more than one in either eye.    What are the causes?  A stye is caused by an infection. The infection is almost always caused by bacteria called Staphylococcus aureus. This is a common type of bacteria that lives on the skin.    An internal stye may result from an infected oil-producing gland inside the eyelid. An external stye may be caused by an infection at the base of the eyelash (hair follicle).    What increases the risk?  You are more likely to develop a stye if:    You have had a stye before.  You have any of these conditions:    Diabetes.  Red, itchy, inflamed eyelids (blepharitis).  A skin condition such as seborrheic dermatitis or rosacea.  High fat levels in your blood (lipids).      What are the signs or symptoms?  The most common symptom of a stye is eyelid pain. Internal styes are more painful than external styes. Other symptoms may include:    Painful swelling of your eyelid.  A scratchy feeling in your eye.  Tearing and redness of your eye.  Pus draining from the stye.    How is this diagnosed?  Your health care provider may be able to diagnose a stye just by examining your eye. The health care provider may also check to make sure:    You do not have a fever or other signs of a more serious infection.  The infection has not spread to other parts of your eye or areas around your eye.    How is this treated?  Most styes will clear up in a few days without treatment or with warm compresses applied to the area. You may need to use antibiotic drops or ointment to treat an infection.    In some cases, if your stye does not heal with routine treatment, your health care provider may drain pus from the stye using a thin blade or needle. This may be done if the stye is large, causing a lot of pain, or affecting your vision.    Follow these instructions at home:  Take over-the-counter and prescription medicines only as told by your health care provider. This includes eye drops or ointments.  If you were prescribed an antibiotic medicine, apply or use it as told by your health care provider. Do not stop using the antibiotic even if your condition improves.  Apply a warm, wet cloth (warm compress) to your eye for 5–10 minutes, 4 times a day.  Clean the affected eyelid as directed by your health care provider.  Do not wear contact lenses or eye makeup until your stye has healed.  Do not try to pop or drain the stye.   Do not rub your eye.  Contact a health care provider if:  You have chills or a fever.  Your stye does not go away after several days.  Your stye affects your vision.  Your eyeball becomes swollen, red, or painful.  Get help right away if:  You have pain when moving your eye around.  Summary  A stye is a bump that forms on an eyelid. It may look like a pimple next to the eyelash.  A stye can form inside the eyelid (internal stye) or outside the eyelid (external stye). A stye can cause redness, swelling, and pain on the eyelid.  Your health care provider may be able to diagnose a stye just by examining your eye.  Apply a warm, wet cloth (warm compress) to your eye for 5–10 minutes, 4 times a day.      Preseptal Cellulitis, Adult     Preseptal cellulitis is an infection of the eyelid and the tissues around the eye (periorbital area). The infection causes painful swelling and redness. This condition may also be called periorbital cellulitis.  In most cases, the condition can be treated with antibiotic medicine at home. It is important to treat preseptal cellulitis right away so that it does not get worse. If it gets worse, it can spread to the eye socket and eye muscles (orbital cellulitis). Orbital cellulitis is a medical emergency.  What are the causes?  Preseptal cellulitis is most commonly caused by bacteria. In rare cases, it can be caused by a virus or fungus. The germs that cause preseptal cellulitis may come from:  A sinus infection that spreads near the eyes.An injury near the eye, such as a scratch, animal bite, or insect bite.A skin rash that becomes infected, such as eczema or poison ivy.An infected pimple on the eyelid (stye).Infection after eyelid surgery or injury.What increases the risk?  You are more likely to develop this condition if:  You have a weakened disease-fighting system (immune system).You have a medical condition that raises your risk for sinus infections, such as nasal polyps.What are the signs or symptoms?  Symptoms of this condition usually develop suddenly. Symptoms may include:  Eyelids that are red and swollen and feel unusually hot.Fever.Difficulty opening the eye.Headache.Facial pain.How is this diagnosed?  This condition may be diagnosed based on your symptoms, your medical history, and an eye exam. You may have tests, such as:  Blood tests.CT scan.MRI.How is this treated?  This condition is treated with antibiotic medicines. These may be given by mouth (orally), through an IV, or as a shot. In rare cases, you may need surgery to drain an infected area.  Follow these instructions at home:  Medicines     If you were prescribed an antibiotic to take at home, take it as told by your health care provider. Do not stop taking the antibiotic even if you start to feel better.Take over-the-counter and prescription medicines only as told by your health care provider.Eye Care     Do not use eye drops without first getting approval from your health care provider.Do not touch or rub your eye. If you wear contact lenses, do not wear them until your health care provider approves.Keep the eye area clean and dry.Wash the eye area with a clean washcloth, warm water, and baby shampoo or mild soap.To help relieve discomfort, place a clean washcloth that is wet with warm water over your eye. Leave the washcloth on for a few minutes, then remove it.General instructions     Wash your hands with soap and water often. If soap and water are not available, use hand .Do not use any products that contain nicotine or tobacco, such as cigarettes and e-cigarettes. If you need help quitting, ask your health care provider.Drink enough fluid to keep your urine pale yellow.Ask your health care provider if it is safe for you to drive.Stay up to date on your vaccinations.Keep all follow-up visits as told by your health care provider. This includes any visits with an eye specialist (ophthalmologist) or dentist. This is important.Get help right away if:  You have new symptoms.Your symptoms get worse or do not get better with treatment.You have a fever.Your vision becomes blurry or gets worse in any way.Your eye looks like it is sticking out or bulging out (proptosis).You have trouble moving your eyes.You have a severe headache.You have neck stiffness or severe neck pain.Summary  Preseptal cellulitis is an infection of the eyelid and the tissues around the eye.Symptoms of preseptal cellulitis usually develop suddenly and include red and swollen eyelids, fever, difficulty opening the eye, headache, and facial pain.This condition is treated with antibiotic medicines. Do not stop taking the antibiotic even if you start to feel better.This information is not intended to replace advice given to you by your health care provider. Make sure you discuss any questions you have with your health care provider.

## 2022-09-28 ENCOUNTER — OUTPATIENT (OUTPATIENT)
Dept: OUTPATIENT SERVICES | Facility: HOSPITAL | Age: 37
LOS: 1 days | Discharge: HOME | End: 2022-09-28

## 2022-09-28 ENCOUNTER — APPOINTMENT (OUTPATIENT)
Dept: OPHTHALMOLOGY | Facility: CLINIC | Age: 37
End: 2022-09-28

## 2022-09-28 PROCEDURE — 92014 COMPRE OPH EXAM EST PT 1/>: CPT

## 2022-10-25 ENCOUNTER — APPOINTMENT (OUTPATIENT)
Dept: INTERNAL MEDICINE | Facility: CLINIC | Age: 37
End: 2022-10-25

## 2022-10-25 ENCOUNTER — NON-APPOINTMENT (OUTPATIENT)
Age: 37
End: 2022-10-25

## 2022-10-25 ENCOUNTER — OUTPATIENT (OUTPATIENT)
Dept: OUTPATIENT SERVICES | Facility: HOSPITAL | Age: 37
LOS: 1 days | Discharge: HOME | End: 2022-10-25

## 2022-10-25 VITALS
HEIGHT: 62 IN | BODY MASS INDEX: 30.36 KG/M2 | HEART RATE: 66 BPM | OXYGEN SATURATION: 99 % | TEMPERATURE: 97.3 F | DIASTOLIC BLOOD PRESSURE: 69 MMHG | SYSTOLIC BLOOD PRESSURE: 106 MMHG | WEIGHT: 165 LBS

## 2022-10-25 DIAGNOSIS — L30.0 NUMMULAR DERMATITIS: ICD-10-CM

## 2022-10-25 DIAGNOSIS — R10.13 EPIGASTRIC PAIN: ICD-10-CM

## 2022-10-25 PROCEDURE — 99214 OFFICE O/P EST MOD 30 MIN: CPT | Mod: GC

## 2022-10-25 NOTE — HISTORY OF PRESENT ILLNESS
[FreeTextEntry1] : 37 year-old male with PMHx of numular eczema, acne, Anxiety, recurrent GERD, H. pylori gastritis s/p eradication, chronic LUQ pain + swelling, DLD in office today for routine follow up. He reports having occasional left side burning chest pain, no known associations + LUQ and LLQ swelling and pain. also complains of papular skin lesions on face/chin\par Does not take any medications regularly Vitamin B12. Denies any n/v/d, SOB, headache, dizziness, urinary symptoms.\par

## 2022-10-25 NOTE — PHYSICAL EXAM
[Normal Sclera/Conjunctiva] : normal sclera/conjunctiva [Normal] : normal rate, regular rhythm, normal S1 and S2 and no murmur heard [No Edema] : there was no peripheral edema [Soft] : abdomen soft [Non Tender] : non-tender [Normal Bowel Sounds] : normal bowel sounds [de-identified] : papular skin lesions on face/chin

## 2022-10-25 NOTE — ASSESSMENT
[FreeTextEntry1] : 37 year-old male with PMHx of numular eczema, acne vulgaris, Anxiety, recurrent GERD, H. pylori gastritis s/p eradication, chronic LUQ pain + swelling, DLD in office today for routine follow up. He reports having occasional left side burning chest pain, no known associations + LUQ and LLQ swelling and pain. \par \par # chest pain, atypical\par # LUQ pain / swelling\par # GERD\par - start metamucil once daily\par - GI referral given\par \par #Anxiety\par - follows with psychiatry\par \par # DLD\par - life style modification \par \par # Numular eczema\par # Acne vulgaris\par - Benzoyl Peroxide-Erythromycin 5-3 % External Gel; APPLY  AND RUB  IN A THIN\par FILM TO AFFECTED AREAS TWICE DAILY.(AM AND PM)\par - dermatology referral\par \par Flu shot refused today\par routine labs ordered\par RTC in 3 months or PRN

## 2022-10-27 DIAGNOSIS — Z00.00 ENCOUNTER FOR GENERAL ADULT MEDICAL EXAMINATION WITHOUT ABNORMAL FINDINGS: ICD-10-CM

## 2022-10-27 DIAGNOSIS — R10.13 EPIGASTRIC PAIN: ICD-10-CM

## 2022-10-27 DIAGNOSIS — K21.9 GASTRO-ESOPHAGEAL REFLUX DISEASE WITHOUT ESOPHAGITIS: ICD-10-CM

## 2022-10-27 DIAGNOSIS — E55.9 VITAMIN D DEFICIENCY, UNSPECIFIED: ICD-10-CM

## 2022-10-27 DIAGNOSIS — E78.5 HYPERLIPIDEMIA, UNSPECIFIED: ICD-10-CM

## 2022-10-27 DIAGNOSIS — L30.0 NUMMULAR DERMATITIS: ICD-10-CM

## 2022-10-27 DIAGNOSIS — R07.89 OTHER CHEST PAIN: ICD-10-CM

## 2022-10-27 DIAGNOSIS — K58.9 IRRITABLE BOWEL SYNDROME WITHOUT DIARRHEA: ICD-10-CM

## 2022-11-18 ENCOUNTER — NON-APPOINTMENT (OUTPATIENT)
Age: 37
End: 2022-11-18

## 2022-11-18 NOTE — REASON FOR VISIT
[Further treatment at this level of care unlikely to yield additional clinical improvement] : Further treatment at this level of care unlikely to yield additional clinical improvement [FreeTextEntry1] : Called patient, he d/c medication and is doing well, he requests his case to be closed.

## 2022-11-23 ENCOUNTER — APPOINTMENT (OUTPATIENT)
Dept: GASTROENTEROLOGY | Facility: CLINIC | Age: 37
End: 2022-11-23

## 2022-11-23 PROBLEM — Z87.898 HISTORY OF ABDOMINAL PAIN: Status: RESOLVED | Noted: 2021-04-09 | Resolved: 2021-10-01

## 2022-11-23 NOTE — REVIEW OF SYSTEMS
[Chest Pain] : chest pain [Palpitations] : palpitations [Abdominal Pain] : abdominal pain [Heartburn] : heartburn [Fever] : no fever [Chills] : no chills [Feeling Poorly] : not feeling poorly [Feeling Tired] : not feeling tired [Heart Rate Is Slow] : the heart rate was not slow [Heart Rate Is Fast] : the heart rate was not fast [Shortness Of Breath] : no shortness of breath [Wheezing] : no wheezing [Cough] : no cough [SOB on Exertion] : no shortness of breath during exertion [Vomiting] : no vomiting [Constipation] : no constipation [Diarrhea] : no diarrhea [Melena] : no melena [Dysuria] : no dysuria [Incontinence] : no incontinence [Hesitancy] : no urinary hesitancy [Nocturia] : no nocturia

## 2022-11-23 NOTE — PHYSICAL EXAM
[General Appearance - Alert] : alert [General Appearance - Well Nourished] : well nourished [General Appearance - Well-Appearing] : healthy appearing [] : no respiratory distress [Respiration, Rhythm And Depth] : normal respiratory rhythm and effort [Auscultation Breath Sounds / Voice Sounds] : lungs were clear to auscultation bilaterally [Heart Rate And Rhythm] : heart rate was normal and rhythm regular [Heart Sounds] : normal S1 and S2 [Heart Sounds Gallop] : no gallops [Murmurs] : no murmurs [Heart Sounds Pericardial Friction Rub] : no pericardial rub [Bowel Sounds] : normal bowel sounds [Abdomen Soft] : soft [Abdomen Mass (___ Cm)] : no abdominal mass palpated [Abdomen Hernia] : no hernia was discovered [FreeTextEntry1] : Mildly tender to palpation the LUQ

## 2022-11-23 NOTE — HISTORY OF PRESENT ILLNESS
[Heartburn] : heartburn worsened [Abdominal Pain] : stable abdominal pain [Abdominal Swelling] : abdominal swelling stable [GERD] : gastroesophageal reflux disease [_________] : Performed [unfilled] [de-identified] : 36 year-old male with PMHx of nummular eczema, GERD, H. pylori gastritis s/p eradication, chronic LUQ pain, epigastric swelling, asymptomatic COVID-19 infection, presents for follow up visit.\par \par GERD: patient has heartburn all the time day and night, stopped PPI because it is not helping. was recently seen by gen meyers and they recommended following up with GI for momentary and Ph monitoring \par Patient is also complaining of chest pressure that radiates to the back. Patient denies any history of CAD, HTN. \par currently also complains of LUQ pain, for a year, 6-7/10, pressure like associated with bulging\par \par EGD: 5/13/19: Gastritis , h. pylori + eradication confirmed on July 2019\par Colonoscopy 8/3/20: Scattered right side diverticulosis, 5mm descending colon tubular adenoma\par CT scan: 5/2020: hepatic steatosis otherwise unremarkable. \par CT scan 2019, diverticulosis. \par Symptoms: endorses heartburn, denies nausea, denies vomiting, denies diarrhea, denies constipation, endorses stable abdominal pain and abdominal swelling stable . \par Pertinent Medical History:  gastroesophageal reflux disease.

## 2023-01-03 ENCOUNTER — EMERGENCY (EMERGENCY)
Facility: HOSPITAL | Age: 38
LOS: 0 days | Discharge: HOME | End: 2023-01-03
Attending: EMERGENCY MEDICINE | Admitting: EMERGENCY MEDICINE
Payer: MEDICAID

## 2023-01-03 VITALS
RESPIRATION RATE: 19 BRPM | HEART RATE: 78 BPM | DIASTOLIC BLOOD PRESSURE: 71 MMHG | TEMPERATURE: 97 F | OXYGEN SATURATION: 98 % | SYSTOLIC BLOOD PRESSURE: 114 MMHG

## 2023-01-03 VITALS
TEMPERATURE: 97 F | RESPIRATION RATE: 16 BRPM | DIASTOLIC BLOOD PRESSURE: 88 MMHG | SYSTOLIC BLOOD PRESSURE: 141 MMHG | HEART RATE: 82 BPM | OXYGEN SATURATION: 99 %

## 2023-01-03 DIAGNOSIS — K21.9 GASTRO-ESOPHAGEAL REFLUX DISEASE WITHOUT ESOPHAGITIS: ICD-10-CM

## 2023-01-03 DIAGNOSIS — R07.89 OTHER CHEST PAIN: ICD-10-CM

## 2023-01-03 DIAGNOSIS — R05.1 ACUTE COUGH: ICD-10-CM

## 2023-01-03 DIAGNOSIS — J06.9 ACUTE UPPER RESPIRATORY INFECTION, UNSPECIFIED: ICD-10-CM

## 2023-01-03 DIAGNOSIS — B34.9 VIRAL INFECTION, UNSPECIFIED: ICD-10-CM

## 2023-01-03 DIAGNOSIS — M79.10 MYALGIA, UNSPECIFIED SITE: ICD-10-CM

## 2023-01-03 DIAGNOSIS — E78.5 HYPERLIPIDEMIA, UNSPECIFIED: ICD-10-CM

## 2023-01-03 DIAGNOSIS — Z20.822 CONTACT WITH AND (SUSPECTED) EXPOSURE TO COVID-19: ICD-10-CM

## 2023-01-03 DIAGNOSIS — R50.9 FEVER, UNSPECIFIED: ICD-10-CM

## 2023-01-03 LAB
ALBUMIN SERPL ELPH-MCNC: 4.2 G/DL — SIGNIFICANT CHANGE UP (ref 3.5–5.2)
ALP SERPL-CCNC: 103 U/L — SIGNIFICANT CHANGE UP (ref 30–115)
ALT FLD-CCNC: 39 U/L — SIGNIFICANT CHANGE UP (ref 0–41)
ANION GAP SERPL CALC-SCNC: 14 MMOL/L — SIGNIFICANT CHANGE UP (ref 7–14)
AST SERPL-CCNC: 28 U/L — SIGNIFICANT CHANGE UP (ref 0–41)
BASOPHILS # BLD AUTO: 0.03 K/UL — SIGNIFICANT CHANGE UP (ref 0–0.2)
BASOPHILS NFR BLD AUTO: 0.4 % — SIGNIFICANT CHANGE UP (ref 0–1)
BILIRUB SERPL-MCNC: 0.4 MG/DL — SIGNIFICANT CHANGE UP (ref 0.2–1.2)
BUN SERPL-MCNC: 13 MG/DL — SIGNIFICANT CHANGE UP (ref 10–20)
CALCIUM SERPL-MCNC: 8.9 MG/DL — SIGNIFICANT CHANGE UP (ref 8.4–10.5)
CHLORIDE SERPL-SCNC: 101 MMOL/L — SIGNIFICANT CHANGE UP (ref 98–110)
CO2 SERPL-SCNC: 23 MMOL/L — SIGNIFICANT CHANGE UP (ref 17–32)
CREAT SERPL-MCNC: 0.7 MG/DL — SIGNIFICANT CHANGE UP (ref 0.7–1.5)
EGFR: 121 ML/MIN/1.73M2 — SIGNIFICANT CHANGE UP
EOSINOPHIL # BLD AUTO: 0.11 K/UL — SIGNIFICANT CHANGE UP (ref 0–0.7)
EOSINOPHIL NFR BLD AUTO: 1.5 % — SIGNIFICANT CHANGE UP (ref 0–8)
FLUAV AG NPH QL: SIGNIFICANT CHANGE UP
FLUBV AG NPH QL: SIGNIFICANT CHANGE UP
GLUCOSE SERPL-MCNC: 105 MG/DL — HIGH (ref 70–99)
HCT VFR BLD CALC: 42.7 % — SIGNIFICANT CHANGE UP (ref 42–52)
HGB BLD-MCNC: 14.2 G/DL — SIGNIFICANT CHANGE UP (ref 14–18)
IMM GRANULOCYTES NFR BLD AUTO: 0.3 % — SIGNIFICANT CHANGE UP (ref 0.1–0.3)
LIDOCAIN IGE QN: 35 U/L — SIGNIFICANT CHANGE UP (ref 7–60)
LYMPHOCYTES # BLD AUTO: 1.6 K/UL — SIGNIFICANT CHANGE UP (ref 1.2–3.4)
LYMPHOCYTES # BLD AUTO: 21.3 % — SIGNIFICANT CHANGE UP (ref 20.5–51.1)
MAGNESIUM SERPL-MCNC: 2 MG/DL — SIGNIFICANT CHANGE UP (ref 1.8–2.4)
MCHC RBC-ENTMCNC: 28.5 PG — SIGNIFICANT CHANGE UP (ref 27–31)
MCHC RBC-ENTMCNC: 33.3 G/DL — SIGNIFICANT CHANGE UP (ref 32–37)
MCV RBC AUTO: 85.7 FL — SIGNIFICANT CHANGE UP (ref 80–94)
MONOCYTES # BLD AUTO: 0.56 K/UL — SIGNIFICANT CHANGE UP (ref 0.1–0.6)
MONOCYTES NFR BLD AUTO: 7.5 % — SIGNIFICANT CHANGE UP (ref 1.7–9.3)
NEUTROPHILS # BLD AUTO: 5.19 K/UL — SIGNIFICANT CHANGE UP (ref 1.4–6.5)
NEUTROPHILS NFR BLD AUTO: 69 % — SIGNIFICANT CHANGE UP (ref 42.2–75.2)
NRBC # BLD: 0 /100 WBCS — SIGNIFICANT CHANGE UP (ref 0–0)
NT-PROBNP SERPL-SCNC: <5 PG/ML — SIGNIFICANT CHANGE UP (ref 0–300)
PLATELET # BLD AUTO: 250 K/UL — SIGNIFICANT CHANGE UP (ref 130–400)
POTASSIUM SERPL-MCNC: 4.4 MMOL/L — SIGNIFICANT CHANGE UP (ref 3.5–5)
POTASSIUM SERPL-SCNC: 4.4 MMOL/L — SIGNIFICANT CHANGE UP (ref 3.5–5)
PROT SERPL-MCNC: 6.9 G/DL — SIGNIFICANT CHANGE UP (ref 6–8)
RBC # BLD: 4.98 M/UL — SIGNIFICANT CHANGE UP (ref 4.7–6.1)
RBC # FLD: 13.1 % — SIGNIFICANT CHANGE UP (ref 11.5–14.5)
RSV RNA NPH QL NAA+NON-PROBE: SIGNIFICANT CHANGE UP
SARS-COV-2 RNA SPEC QL NAA+PROBE: SIGNIFICANT CHANGE UP
SODIUM SERPL-SCNC: 138 MMOL/L — SIGNIFICANT CHANGE UP (ref 135–146)
TROPONIN T SERPL-MCNC: <0.01 NG/ML — SIGNIFICANT CHANGE UP
WBC # BLD: 7.51 K/UL — SIGNIFICANT CHANGE UP (ref 4.8–10.8)
WBC # FLD AUTO: 7.51 K/UL — SIGNIFICANT CHANGE UP (ref 4.8–10.8)

## 2023-01-03 PROCEDURE — 99284 EMERGENCY DEPT VISIT MOD MDM: CPT

## 2023-01-03 PROCEDURE — 71045 X-RAY EXAM CHEST 1 VIEW: CPT | Mod: 26

## 2023-01-03 PROCEDURE — 93010 ELECTROCARDIOGRAM REPORT: CPT

## 2023-01-03 RX ORDER — KETOROLAC TROMETHAMINE 30 MG/ML
15 SYRINGE (ML) INJECTION ONCE
Refills: 0 | Status: DISCONTINUED | OUTPATIENT
Start: 2023-01-03 | End: 2023-01-03

## 2023-01-03 RX ORDER — FAMOTIDINE 10 MG/ML
20 INJECTION INTRAVENOUS ONCE
Refills: 0 | Status: COMPLETED | OUTPATIENT
Start: 2023-01-03 | End: 2023-01-03

## 2023-01-03 RX ADMIN — FAMOTIDINE 100 MILLIGRAM(S): 10 INJECTION INTRAVENOUS at 06:57

## 2023-01-03 RX ADMIN — Medication 15 MILLIGRAM(S): at 06:02

## 2023-01-03 NOTE — ED PROVIDER NOTE - NS ED ROS FT
CONSTITUTIONAL: Negatve   SKIN: Negatve   HEAD: Negatve   EYES: Negatve   ENT: Negatve   NECK: Negatve   CARD: see hpi   RESP:Negatve   ABD: Negatve   EXT: Negatve  LYMPH: Negatve   NEURO: Negatve   PSYCH: Negatve

## 2023-01-03 NOTE — ED ADULT TRIAGE NOTE - SPO2 (%)
Patient in stable condition. Alert and oriented x3. Unlabored breathing present. Patient aware of plan of care. Patient discharge instructions given and explained. Follow up information instructions given. Prescription order explained to patient. Patient agreeable to plan of care. Patient states understanding and denies any questions or concerns. Patient ambulated out of ER with no complications.         Linda Beaver RN  10/02/22 [phone]

## 2023-01-03 NOTE — ED PROVIDER NOTE - OBJECTIVE STATEMENT
37-year-old male with past medical history of GERD, and HLD presents to the ED complaining of subjective fever, body aches, nonproductive cough, sore throat, and mild chest pain x2 days.  Patient has been taking ibuprofen with minimal relief of symptoms.  He presented today because his pain is persisting.  Patient had CCTA and January 2022 which was CAD RADS 0.  He denies other complaints.

## 2023-01-03 NOTE — ED PROVIDER NOTE - NSFOLLOWUPINSTRUCTIONS_ED_ALL_ED_FT
Viral Respiratory Infection    A viral respiratory infection is an illness that affects parts of the body used for breathing, like the lungs, nose, and throat. It is caused by a germ called a virus. Symptoms can include runny nose, coughing, sneezing, fatigue, body aches, sore throat, fever, or headache. Over the counter medicine can be used to manage the symptoms but the infection typically goes away on its own in 5 to 10 days.     SEEK IMMEDIATE MEDICAL CARE IF YOU HAVE ANY OF THE FOLLOWING SYMPTOMS: shortness of breath, chest pain, fever over 10 days, or lightheadedness/dizziness.  Chest Pain    Chest pain can be caused by many different conditions which may or may not be dangerous. Causes include heartburn, lung infections, heart attack, blood clot in lungs, skin infections, strain or damage to muscle, cartilage, or bones, etc. In addition to a history and physical examination, an electrocardiogram (ECG) or other lab tests may have been performed to determine the cause of your chest pain. Follow up with your primary care provider or with a cardiologist as instructed.     SEEK IMMEDIATE MEDICAL CARE IF YOU HAVE ANY OF THE FOLLOWING SYMPTOMS: worsening chest pain, coughing up blood, unexplained back/neck/jaw pain, severe abdominal pain, dizziness or lightheadedness, fainting, shortness of breath, sweaty or clammy skin, vomiting, or racing heart beat. These symptoms may represent a serious problem that is an emergency. Do not wait to see if the symptoms will go away. Get medical help right away. Call 911 and do not drive yourself to the hospital.

## 2023-01-03 NOTE — ED PROVIDER NOTE - CLINICAL SUMMARY MEDICAL DECISION MAKING FREE TEXT BOX
atypical cp/viral syndrome  labs, imaging, ekg, supportive care    CXR no infiltrates, effusions or ptx

## 2023-01-03 NOTE — ED ADULT NURSE REASSESSMENT NOTE - NS ED NURSE REASSESS COMMENT FT1
Upon initial assessment, no acute distress, denies CP, no SOB, unlabored breathing, equal rise & fall, no N/V/D, pt laying comfortably in the stretcher, unknown plan, reached out to MD for next steps.

## 2023-01-03 NOTE — ED PROVIDER NOTE - PATIENT PORTAL LINK FT
You can access the FollowMyHealth Patient Portal offered by [facility] by registering at the following website: [email or web address]. By joining [facility]’s FollowMyHealth portal, you will also be able to view your health information using other applications (apps) compatible with our system.

## 2023-01-26 ENCOUNTER — APPOINTMENT (OUTPATIENT)
Dept: DERMATOLOGY | Facility: CLINIC | Age: 38
End: 2023-01-26
Payer: SUBSIDIZED

## 2023-01-26 ENCOUNTER — NON-APPOINTMENT (OUTPATIENT)
Age: 38
End: 2023-01-26

## 2023-01-26 ENCOUNTER — OUTPATIENT (OUTPATIENT)
Dept: OUTPATIENT SERVICES | Facility: HOSPITAL | Age: 38
LOS: 1 days | Discharge: HOME | End: 2023-01-26

## 2023-01-26 DIAGNOSIS — L70.0 ACNE VULGARIS: ICD-10-CM

## 2023-01-26 DIAGNOSIS — L20.9 ATOPIC DERMATITIS, UNSPECIFIED: ICD-10-CM

## 2023-01-26 PROCEDURE — 99214 OFFICE O/P EST MOD 30 MIN: CPT | Mod: GC

## 2023-01-26 NOTE — ASSESSMENT
[FreeTextEntry1] : 34yo M hx of acne vulgaris and eczema presenting to the clinic for his acne and eczema\par \par #Acne vulgaris\par - started clindamycin gel to apply BID PRN to beard area \par \par #Nummular eczema\par #Atopic dermatitis\par - had biopsy in 2021- confirmed eczematous changes\par - has not been taking his medication lately but not scratching it either. Will prescribe betamethasone cream to be used in affected areas bid except for face/ sed\par - RTC in 2 months

## 2023-01-26 NOTE — PHYSICAL EXAM
[Alert] : alert [Oriented x 3] : ~L oriented x 3 [Well Nourished] : well nourished [FreeTextEntry3] : white comedones along beard area, erythematous patch along neck area down to upper scapular region without scaling, non-tender. On the RLQ: hyperpigmented patch with scaling.

## 2023-01-26 NOTE — HISTORY OF PRESENT ILLNESS
[FreeTextEntry1] : ecezma and acne [de-identified] : 37 year-old male with PMHx of numular eczema (biopsy proven), acne, presenting to the dermatology clinic for eczema and acne. \par \par Patient currently c/o itchy rash around the neck and pimples on the face, along forehead and beard area. Was prescribed benzoyl peroxide and triamcinolone but was unable to  at his Rx. Endorses he does have itchiness but he tries not to scratch. He tried triamcinolone previously without much relief. Likewise, endorses having acne, some as white comedones or even pustules, do grow but he does not pick at them. Denies any hx of skin cancer. Denies fam hx of skin cancer.

## 2023-01-27 DIAGNOSIS — L70.0 ACNE VULGARIS: ICD-10-CM

## 2023-01-27 DIAGNOSIS — L20.9 ATOPIC DERMATITIS, UNSPECIFIED: ICD-10-CM

## 2023-02-02 NOTE — ED PROVIDER NOTE - CARE PLAN
Progress Note    he is a 75y.o. year old male who presents for evalution. Subjective:     Pt here for [address] and he is taking lisinopril 40 and Norvasc 10 and BP remains high. Went to pt first and BP was high. States urology said he had protein in urine and they were going to have him follow up for it. Has had hypokalemia in past so will use a K sparing diuretic. Prediabetes at 5.8 last check. Pain in gluteus region, states he pulled a muscle in his back moving something. Aspirin will help it go away. Pt not interested in LDCT lung cancer aware of risks. If he changes his mind he rox let me know. Reviewed PmHx, RxHx, FmHx, SocHx, AllgHx and updated and dated in the chart. Review of Systems - negative except as listed above in the HPI    Objective:     Vitals:    02/02/23 1528   BP: (!) 145/95   Pulse: 92   Resp: 18   Temp: 98.3 °F (36.8 °C)   TempSrc: Oral   SpO2: 97%   Weight: 201 lb (91.2 kg)   Height: 5' 8\" (1.727 m)       Current Outpatient Medications   Medication Sig    spironolactone (ALDACTONE) 25 mg tablet Take 1 Tablet by mouth daily. lisinopriL (PRINIVIL, ZESTRIL) 20 mg tablet Take 1 Tablet by mouth daily. amLODIPine (NORVASC) 10 mg tablet Take 1 Tablet by mouth daily. atorvastatin (LIPITOR) 10 mg tablet Take 1 Tablet by mouth daily. potassium chloride (Klor-Con M20) 20 mEq tablet TAKE 1 TABLET BY MOUTH EVERY DAY     No current facility-administered medications for this visit. Physical Examination: General appearance - alert, well appearing, and in no distress  Chest - clear to auscultation, no wheezes, rales or rhonchi, symmetric air entry  Heart - normal rate, regular rhythm, normal S1, S2, no murmurs, rubs, clicks or gallops      Assessment/ Plan:   Diagnoses and all orders for this visit:    1. Proteinuria, unspecified type  -     URINALYSIS W/ RFLX MICROSCOPIC; Future    2. Primary hypertension  -     METABOLIC PANEL, COMPREHENSIVE;  Future  - spironolactone (ALDACTONE) 25 mg tablet; Take 1 Tablet by mouth daily. We will plan to lower the lisinopril while adding on the spironolactone to hopefully get better blood pressure control. 3. Prediabetes  -     HEMOGLOBIN A1C WITH EAG; Future  -     METABOLIC PANEL, COMPREHENSIVE; Future    4. Mixed hyperlipidemia  -     LIPID PANEL; Future  -     METABOLIC PANEL, COMPREHENSIVE; Future    5. Essential hypertension  -     lisinopriL (PRINIVIL, ZESTRIL) 20 mg tablet; Take 1 Tablet by mouth daily. 6. Medicare annual wellness visit, subsequent    7. Special screening for malignant neoplasm of prostate  Comments:  Discussed the potential benefits and harms of the prostate-specific antigen (PSA) serum level test as a screening tool for early prostate cancer detection. Orders:  -     PSA SCREENING (SCREENING); Future     Follow-up and Dispositions    Return in about 4 weeks (around 3/2/2023), or if symptoms worsen or fail to improve, for BP check. I have discussed the diagnosis with the patient and the intended plan as seen in the above orders. The patient has received an after-visit summary and questions were answered concerning future plans. Pt conveyed understanding of plan. Medication Side Effects and Warnings were discussed with patient    An electronic signature was used to authenticate this note  Joaquin Gosselin, DO      This is the Subsequent Medicare Annual Wellness Exam, performed 12 months or more after the Initial AWV or the last Subsequent AWV    I have reviewed the patient's medical history in detail and updated the computerized patient record. Assessment/Plan   Education and counseling provided:  Are appropriate based on today's review and evaluation  Prostate cancer screening tests (PSA, covered annually)    1. Proteinuria, unspecified type  -     URINALYSIS W/ RFLX MICROSCOPIC; Future  2. Primary hypertension  -     METABOLIC PANEL, COMPREHENSIVE;  Future  -     spironolactone (ALDACTONE) 25 mg tablet; Take 1 Tablet by mouth daily. , Normal, Disp-30 Tablet, R-1  3. Prediabetes  -     HEMOGLOBIN A1C WITH EAG; Future  -     METABOLIC PANEL, COMPREHENSIVE; Future  4. Mixed hyperlipidemia  -     LIPID PANEL; Future  -     METABOLIC PANEL, COMPREHENSIVE; Future  5. Essential hypertension  -     lisinopriL (PRINIVIL, ZESTRIL) 20 mg tablet; Take 1 Tablet by mouth daily. , Normal, Disp-30 Tablet, R-1  6. Medicare annual wellness visit, subsequent  7. Special screening for malignant neoplasm of prostate  Comments:  Discussed the potential benefits and harms of the prostate-specific antigen (PSA) serum level test as a screening tool for early prostate cancer detection. Orders:  -     PSA SCREENING (SCREENING); Future       Depression Risk Factor Screening     3 most recent PHQ Screens 2/2/2023   Little interest or pleasure in doing things Not at all   Feeling down, depressed, irritable, or hopeless Not at all   Total Score PHQ 2 0       Alcohol & Drug Abuse Risk Screen    Do you average more than 1 drink per night or more than 7 drinks a week: No    In the past three months have you have had more than 4 drinks containing alcohol on one occasion: No          Functional Ability and Level of Safety    Hearing: Hearing is good. Activities of Daily Living: The home contains: no safety equipment. Patient does total self care      Ambulation: with no difficulty     Fall Risk:  Fall Risk Assessment, last 12 mths 2/2/2023   Able to walk? Yes   Fall in past 12 months? 0   Do you feel unsteady?  0   Are you worried about falling 0      Abuse Screen:  Patient is not abused       Cognitive Screening    Has your family/caregiver stated any concerns about your memory: no         Health Maintenance Due     Health Maintenance Due   Topic Date Due    COVID-19 Vaccine (1) Never done    Pneumococcal 65+ years (1 - PCV) Never done    DTaP/Tdap/Td series (1 - Tdap) Never done    Shingles Vaccine (1 of 2) Never done Flu Vaccine (1) 08/01/2022    Depression Screen  08/20/2022    A1C test (Diabetic or Prediabetic)  08/20/2022    Lipid Screen  08/20/2022    Colorectal Cancer Screening Combo  12/23/2022       Patient Care Team   Patient Care Team:  Aren Donaldson DO as PCP - General (Family Medicine)  Aren Donaldson DO as PCP - [facility][1] Empaneled Provider  Charli Clark MD (Urology)    History     Patient Active Problem List   Diagnosis Code    HTN (hypertension) V88    Complicated UTI (urinary tract infection) N39.0    Epididymoorchitis N45.3    Sepsis ([facility] [address] A41.9    C. difficile colitis A04.72    Prediabetes R73.03     Past Medical History:   Diagnosis Date    Essential hypertension     Hypercholesterolemia       History reviewed. No pertinent surgical history. Current Outpatient Medications   Medication Sig Dispense Refill    spironolactone (ALDACTONE) 25 mg tablet Take 1 Tablet by mouth daily. 30 Tablet 1    lisinopriL (PRINIVIL, ZESTRIL) 20 mg tablet Take 1 Tablet by mouth daily. 30 Tablet 1    amLODIPine (NORVASC) 10 mg tablet Take 1 Tablet by mouth daily. 90 Tablet 1    atorvastatin (LIPITOR) 10 mg tablet Take 1 Tablet by mouth daily.  90 Tablet 1    potassium chloride (Klor-Con M20) 20 mEq tablet TAKE 1 TABLET BY MOUTH EVERY DAY 90 Tablet 3     No Known Allergies    Family History   Problem Relation Age of Onset    Diabetes Mother      Social History     Tobacco Use    Smoking status: Every Day     Packs/day: 0.25     Years: 30.00     Pack years: 7.50     Types: Cigarettes    Smokeless tobacco: Never   Substance Use Topics    Alcohol use: Yes     Comment: nancy Varela DO 1 Principal Discharge DX:	Chest pain  Secondary Diagnosis:	Back pain

## 2023-02-07 ENCOUNTER — NON-APPOINTMENT (OUTPATIENT)
Age: 38
End: 2023-02-07

## 2023-02-07 ENCOUNTER — OUTPATIENT (OUTPATIENT)
Dept: OUTPATIENT SERVICES | Facility: HOSPITAL | Age: 38
LOS: 1 days | End: 2023-02-07
Payer: COMMERCIAL

## 2023-02-07 ENCOUNTER — APPOINTMENT (OUTPATIENT)
Dept: INTERNAL MEDICINE | Facility: CLINIC | Age: 38
End: 2023-02-07
Payer: COMMERCIAL

## 2023-02-07 ENCOUNTER — APPOINTMENT (OUTPATIENT)
Dept: INTERNAL MEDICINE | Facility: CLINIC | Age: 38
End: 2023-02-07

## 2023-02-07 VITALS
OXYGEN SATURATION: 99 % | HEIGHT: 62 IN | TEMPERATURE: 97.4 F | DIASTOLIC BLOOD PRESSURE: 77 MMHG | HEART RATE: 67 BPM | SYSTOLIC BLOOD PRESSURE: 116 MMHG | WEIGHT: 161 LBS | BODY MASS INDEX: 29.63 KG/M2

## 2023-02-07 DIAGNOSIS — E55.9 VITAMIN D DEFICIENCY, UNSPECIFIED: ICD-10-CM

## 2023-02-07 DIAGNOSIS — Z00.00 ENCOUNTER FOR GENERAL ADULT MEDICAL EXAMINATION WITHOUT ABNORMAL FINDINGS: ICD-10-CM

## 2023-02-07 DIAGNOSIS — K58.9 IRRITABLE BOWEL SYNDROME W/OUT DIARRHEA: ICD-10-CM

## 2023-02-07 LAB
25(OH)D3 SERPL-MCNC: 19 NG/ML
ALBUMIN SERPL ELPH-MCNC: 4.6 G/DL
ALP BLD-CCNC: 75 U/L
ALT SERPL-CCNC: 58 U/L
ANION GAP SERPL CALC-SCNC: 9 MMOL/L
AST SERPL-CCNC: 28 U/L
BASOPHILS # BLD AUTO: 0.03 K/UL
BASOPHILS NFR BLD AUTO: 0.5 %
BILIRUB SERPL-MCNC: 0.6 MG/DL
BUN SERPL-MCNC: 14 MG/DL
CALCIUM SERPL-MCNC: 9.5 MG/DL
CHLORIDE SERPL-SCNC: 102 MMOL/L
CHOLEST SERPL-MCNC: 207 MG/DL
CO2 SERPL-SCNC: 26 MMOL/L
CREAT SERPL-MCNC: 0.8 MG/DL
EGFR: 116 ML/MIN/1.73M2
EOSINOPHIL # BLD AUTO: 0.1 K/UL
EOSINOPHIL NFR BLD AUTO: 1.7 %
ESTIMATED AVERAGE GLUCOSE: 103 MG/DL
FOLATE SERPL-MCNC: 18.1 NG/ML
GLUCOSE SERPL-MCNC: 96 MG/DL
HBA1C MFR BLD HPLC: 5.2 %
HCT VFR BLD CALC: 44.4 %
HDLC SERPL-MCNC: 42 MG/DL
HGB BLD-MCNC: 14.9 G/DL
IMM GRANULOCYTES NFR BLD AUTO: 0.2 %
LDLC SERPL CALC-MCNC: 141 MG/DL
LYMPHOCYTES # BLD AUTO: 1.87 K/UL
LYMPHOCYTES NFR BLD AUTO: 30.9 %
MAN DIFF?: NORMAL
MCHC RBC-ENTMCNC: 29.2 PG
MCHC RBC-ENTMCNC: 33.6 G/DL
MCV RBC AUTO: 87.1 FL
MONOCYTES # BLD AUTO: 0.4 K/UL
MONOCYTES NFR BLD AUTO: 6.6 %
NEUTROPHILS # BLD AUTO: 3.65 K/UL
NEUTROPHILS NFR BLD AUTO: 60.1 %
NONHDLC SERPL-MCNC: 165 MG/DL
PLATELET # BLD AUTO: 240 K/UL
POTASSIUM SERPL-SCNC: 4.4 MMOL/L
PROT SERPL-MCNC: 7.1 G/DL
RBC # BLD: 5.1 M/UL
RBC # FLD: 13.3 %
SODIUM SERPL-SCNC: 137 MMOL/L
TRIGL SERPL-MCNC: 121 MG/DL
TSH SERPL-ACNC: 2.5 UIU/ML
VIT B12 SERPL-MCNC: >2000 PG/ML
WBC # FLD AUTO: 6.06 K/UL

## 2023-02-07 PROCEDURE — 99214 OFFICE O/P EST MOD 30 MIN: CPT | Mod: GC

## 2023-02-07 PROCEDURE — 99214 OFFICE O/P EST MOD 30 MIN: CPT

## 2023-02-07 NOTE — PHYSICAL EXAM
[No Acute Distress] : no acute distress [No Respiratory Distress] : no respiratory distress  [No Accessory Muscle Use] : no accessory muscle use [Clear to Auscultation] : lungs were clear to auscultation bilaterally [Regular Rhythm] : with a regular rhythm [Normal S1, S2] : normal S1 and S2 [No Edema] : there was no peripheral edema [Soft] : abdomen soft [Non Tender] : non-tender [Non-distended] : non-distended [No Focal Deficits] : no focal deficits [Normal Affect] : the affect was normal [Normal Insight/Judgement] : insight and judgment were intact

## 2023-02-08 DIAGNOSIS — K58.9 IRRITABLE BOWEL SYNDROME WITHOUT DIARRHEA: ICD-10-CM

## 2023-02-08 DIAGNOSIS — E78.5 HYPERLIPIDEMIA, UNSPECIFIED: ICD-10-CM

## 2023-02-08 DIAGNOSIS — K21.9 GASTRO-ESOPHAGEAL REFLUX DISEASE WITHOUT ESOPHAGITIS: ICD-10-CM

## 2023-02-08 NOTE — HISTORY OF PRESENT ILLNESS
[FreeTextEntry1] : Follow-up [de-identified] : 36 yo male with PMHx of numular eczema, acne, Anxiety, recurrent GERD, H. pylori gastritis s/p eradication, chronic LUQ pain + swelling, DLD in office today for routine follow up. \par \par \par

## 2023-02-08 NOTE — ASSESSMENT
[FreeTextEntry1] : 37 year-old male with PMHx of numular eczema, acne vulgaris, Anxiety, recurrent GERD, H. pylori gastritis s/p eradication, chronic LUQ pain + swelling, DLD in office today for routine follow up. \par \par # chest pain, atypical\par # LUQ pain / swelling\par # GERD\par - start metamucil once daily\par - GI referral given\par - chest pain also reproducible with palpation; pt works as a  may be job related \par \par #Anxiety\par - follows with psychiatry\par - c/w sertraline\par \par #Vitamin D deficiency\par - vitamin d level 19\par - start vitamin D 2000 supplement for 3 months\par - repeat vitamin D levels\par \par # DLD\par - , total chol 207\par - recommend life style modification for now\par - repeat lipid panel\par \par # Numular eczema\par # Acne vulgaris\par - follows with dermatology\par - c/w benzoyl peroxide, clindamycin gel,  and betamethasone cream\par \par Flu shot declined \par routine labs ordered\par RTC in 6 months or PRN

## 2023-02-15 ENCOUNTER — APPOINTMENT (OUTPATIENT)
Dept: GASTROENTEROLOGY | Facility: CLINIC | Age: 38
End: 2023-02-15
Payer: COMMERCIAL

## 2023-02-15 ENCOUNTER — APPOINTMENT (OUTPATIENT)
Dept: GASTROENTEROLOGY | Facility: CLINIC | Age: 38
End: 2023-02-15

## 2023-02-15 ENCOUNTER — OUTPATIENT (OUTPATIENT)
Dept: OUTPATIENT SERVICES | Facility: HOSPITAL | Age: 38
LOS: 1 days | End: 2023-02-15
Payer: COMMERCIAL

## 2023-02-15 VITALS
HEIGHT: 62 IN | SYSTOLIC BLOOD PRESSURE: 109 MMHG | WEIGHT: 161 LBS | DIASTOLIC BLOOD PRESSURE: 74 MMHG | TEMPERATURE: 96.5 F | BODY MASS INDEX: 29.63 KG/M2 | HEART RATE: 65 BPM

## 2023-02-15 DIAGNOSIS — G89.29 LEFT UPPER QUADRANT PAIN: ICD-10-CM

## 2023-02-15 DIAGNOSIS — R10.12 LEFT UPPER QUADRANT PAIN: ICD-10-CM

## 2023-02-15 DIAGNOSIS — Z78.9 OTHER SPECIFIED HEALTH STATUS: ICD-10-CM

## 2023-02-15 DIAGNOSIS — K21.9 GASTRO-ESOPHAGEAL REFLUX DISEASE W/OUT ESOPHAGITIS: ICD-10-CM

## 2023-02-15 DIAGNOSIS — Z00.00 ENCOUNTER FOR GENERAL ADULT MEDICAL EXAMINATION WITHOUT ABNORMAL FINDINGS: ICD-10-CM

## 2023-02-15 DIAGNOSIS — D36.9 BENIGN NEOPLASM, UNSPECIFIED SITE: ICD-10-CM

## 2023-02-15 PROCEDURE — 99214 OFFICE O/P EST MOD 30 MIN: CPT | Mod: GC

## 2023-02-15 PROCEDURE — 99214 OFFICE O/P EST MOD 30 MIN: CPT

## 2023-02-15 NOTE — PHYSICAL EXAM
[Alert] : alert [Normal Voice/Communication] : normal voice/communication [Sclera] : the sclera and conjunctiva were normal [Hearing Threshold Finger Rub Not [place]] : hearing was normal [Normal Appearance] : the appearance of the neck was normal [No Respiratory Distress] : no respiratory distress [No Acc Muscle Use] : no accessory muscle use [Respiration, Rhythm And Depth] : normal respiratory rhythm and effort [Heart Rate And Rhythm] : heart rate was normal and rhythm regular [Normal S1, S2] : normal S1 and S2 [Abdomen Tenderness] : non-tender [No Masses] : no abdominal mass palpated [Abdomen Soft] : soft [No CVA Tenderness] : no CVA  tenderness [No Clubbing, Cyanosis] : no clubbing or cyanosis of the fingernails [Normal Color / Pigmentation] : normal skin color and pigmentation [Oriented To Time, Place, And Person] : oriented to person, place, and time [Healthy Appearing] : healthy appearing [No Acute Distress] : no acute distress [Bowel Sounds] : normal bowel sounds [de-identified] : + Carnett sign

## 2023-02-15 NOTE — REVIEW OF SYSTEMS
[As Noted in HPI] : as noted in HPI [Abdominal Pain] : abdominal pain [Heartburn] : heartburn [Fever] : no fever [Chills] : no chills [Recent Weight Gain (___ Lbs)] : no recent weight gain [Eye Pain] : no eye pain [Red Eyes] : eyes not red [Chest Pain] : no chest pain [Palpitations] : no palpitations [Shortness Of Breath] : no shortness of breath [SOB on Exertion] : no shortness of breath during exertion [Vomiting] : no vomiting [Constipation] : no constipation [Diarrhea] : no diarrhea [Melena (black stool)] : no melena [Bleeding] : no bleeding [Skin Wound] : no skin wound [Itching] : no itching [Confused] : no confusion [Convulsions] : no convulsions [Easy Bleeding] : no tendency for easy bleeding [Easy Bruising] : no tendency for easy bruising

## 2023-02-15 NOTE — HISTORY OF PRESENT ILLNESS
[FreeTextEntry1] : 37 year old male with PMH of H Pylori is here for follow up for GERD and abdominal pain. \par pt was seen previously in GI clinic for same symptoms in 2021. Pt reports LUQ and epigastric pain associated with physical movement. Denies any association with food. Denies any nausea or vomiting. report heartburn and regurgitation. Denies weight loss melena hematemesis or hematochezia. was seen by cardiology for chest pain which deemed noncardiac. \par \par \par EGD: 5/13/19: Gastritis , h. pylori + eradication confirmed on July 2019\par Colonoscopy 8/3/20: Scattered right side diverticulosis, 5 mm descending colon tubular adenoma\par CT scan: 5/2021: hepatic steatosis otherwise unremarkable. \par

## 2023-02-16 DIAGNOSIS — D36.9 BENIGN NEOPLASM, UNSPECIFIED SITE: ICD-10-CM

## 2023-02-16 DIAGNOSIS — R10.12 LEFT UPPER QUADRANT PAIN: ICD-10-CM

## 2023-02-16 DIAGNOSIS — K57.30 DIVERTICULOSIS OF LARGE INTESTINE WITHOUT PERFORATION OR ABSCESS WITHOUT BLEEDING: ICD-10-CM

## 2023-02-16 DIAGNOSIS — K21.9 GASTRO-ESOPHAGEAL REFLUX DISEASE WITHOUT ESOPHAGITIS: ICD-10-CM

## 2023-02-16 DIAGNOSIS — K76.0 FATTY (CHANGE OF) LIVER, NOT ELSEWHERE CLASSIFIED: ICD-10-CM

## 2023-02-22 ENCOUNTER — RESULT REVIEW (OUTPATIENT)
Age: 38
End: 2023-02-22

## 2023-02-22 ENCOUNTER — TRANSCRIPTION ENCOUNTER (OUTPATIENT)
Age: 38
End: 2023-02-22

## 2023-02-22 ENCOUNTER — OUTPATIENT (OUTPATIENT)
Dept: INPATIENT UNIT | Facility: HOSPITAL | Age: 38
LOS: 1 days | Discharge: HOME | End: 2023-02-22
Payer: COMMERCIAL

## 2023-02-22 VITALS
DIASTOLIC BLOOD PRESSURE: 66 MMHG | HEART RATE: 64 BPM | OXYGEN SATURATION: 97 % | RESPIRATION RATE: 12 BRPM | SYSTOLIC BLOOD PRESSURE: 108 MMHG

## 2023-02-22 VITALS — WEIGHT: 164.91 LBS

## 2023-02-22 DIAGNOSIS — K21.9 GASTRO-ESOPHAGEAL REFLUX DISEASE WITHOUT ESOPHAGITIS: ICD-10-CM

## 2023-02-22 PROCEDURE — 88312 SPECIAL STAINS GROUP 1: CPT

## 2023-02-22 PROCEDURE — 91035 G-ESOPH REFLX TST W/ELECTROD: CPT | Mod: 26,XU

## 2023-02-22 PROCEDURE — 88305 TISSUE EXAM BY PATHOLOGIST: CPT

## 2023-02-22 PROCEDURE — C1889: CPT

## 2023-02-22 PROCEDURE — 88312 SPECIAL STAINS GROUP 1: CPT | Mod: 26

## 2023-02-22 PROCEDURE — 43239 EGD BIOPSY SINGLE/MULTIPLE: CPT

## 2023-02-22 PROCEDURE — 88305 TISSUE EXAM BY PATHOLOGIST: CPT | Mod: 26

## 2023-02-22 RX ORDER — SODIUM CHLORIDE 9 MG/ML
1000 INJECTION, SOLUTION INTRAVENOUS
Refills: 0 | Status: DISCONTINUED | OUTPATIENT
Start: 2023-02-22 | End: 2023-02-22

## 2023-02-22 NOTE — ASU DISCHARGE PLAN (ADULT/PEDIATRIC) - ASU DC SPECIAL INSTRUCTIONSFT
- Follow up with our [facility] located at [address] [place] [address]. Phone Number: [phone]

## 2023-02-22 NOTE — ASU PATIENT PROFILE, ADULT - FALL HARM RISK - UNIVERSAL INTERVENTIONS
Bed in lowest position, wheels locked, appropriate side rails in place/Call bell, personal items and telephone in reach/Instruct patient to call for assistance before getting out of bed or chair/Non-slip footwear when patient is out of bed/[place] to call system/Physically safe environment - no spills, clutter or unnecessary equipment/Purposeful Proactive Rounding/Room/bathroom lighting operational, light cord in reach

## 2023-02-22 NOTE — H&P PST ADULT - CONSTITUTIONAL
[facility] GI  Gastroenterology Progress Note  Irean Apgar is a 28 y.o. female patient. 1. Colitis    2. Hypoalbuminemia    3. Lightheadedness    4. Chronic anemia        SUBJECTIVE:  Feels some better. Little more strength. Trying to take walks. Physical    VITALS:  BP (!) 98/57   Pulse 83   Temp 98 °F (36.7 °C) (Temporal)   Resp 16   Ht 5' 6\" (1.676 m)   Wt 111 lb 9.6 oz (50.6 kg)   SpO2 95%   BMI 18.01 kg/m²   TEMPERATURE:  Current - Temp: 98 °F (36.7 °C); Max - Temp  Av.2 °F (36.8 °C)  Min: 97.7 °F (36.5 °C)  Max: 98.6 °F (37 °C)    Abdomen soft, ND, NT, no HSM, Bowel sounds normal     Data      Recent Labs     20   WBC 11.1*   HGB 8.0*   HCT 23.9*   MCV 93.6        Recent Labs     20  0610   *  --    K 3.6 3.2*   CL 96*  --    CO2 30  --    PHOS  --  2.4*   BUN 7  --    CREATININE 0.7  --      Recent Labs     20   AST 34   ALT 15   BILITOT 2.8*   ALKPHOS 110     Recent Labs     20   LIPASE 6.0*           ASSESSMENT :  Acute colitis - would empirically treat with 10 day course of Cipro/Flagyl as you are doing. Would d/c IVF has appears hydrated by exam and labs.     Weakness/Severe Protein calorie malnutrition - She is losing weight and obviously failing to thrive at home despite IV therapy and J-tube feeds at home. Seen at ED three times week of admission and failed at home. Appreciate Dietician consult to reassess caloric needs in setting of recent wound infection. Starting Osmolite per rec's and watching for refeeding synd. Await PT/OT assessment for strengthening and need for rehab placement.       Cirrhosis - likely due to DINH with evidence of portal HTN (ascites) on CT. She developed MICK while on higher doses of diuretics as outpatient. Today has less edema than yesterday. Will continue Aldactone today and hold off on furosemide today.   Continue Albumin 25 gm IV q 8 hrs x 3 days in hopes of mobilizing anasarca.       Anemia - Hb is 8 this AM.  She has been iron deficient, but intolerant (high fevers, hemolysis, etc) to most IV iron preparations. Endoscopic eval multiple times (-) for bleeding source, so assumed poor iron absorption due to GI anatomy (no duodenum). Consider Heme eval for appropriate iron infusion with Solumedrol/Benadry premeds.     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 Has chronic constipation and GI dysmotility due to both. Continue Methadone and high dose Miralax for now. PLAN   :  1) Continue Cipro/Flagyl. 2) Await COVID results. 3) Continue TF's per dietician rec's. 4) PT/OT eval and Rx. 5) Continue Miralax. 6) Continue Aldactone 50 mg qday and hold off on Lasix 40 mg daily for now. 7) Continue rest of home meds. 8) Would get Echo to r/o cardiac dysfunction resulting from malnutrition contributing to her poor functional status and peripheral edema.     Karol Teague MD  [address]  5/31/2020 normal/well-groomed/no distress

## 2023-02-22 NOTE — ASU DISCHARGE PLAN (ADULT/PEDIATRIC) - CALL YOUR DOCTOR IF YOU HAVE ANY OF THE FOLLOWING:
Bleeding that does not stop/Fever greater than (need to indicate Fahrenheit or Celsius)/Numbness, tingling, color or temperature change to extremity/Unable to urinate/Increased irritability or sluggishness

## 2023-02-22 NOTE — ASU DISCHARGE PLAN (ADULT/PEDIATRIC) - NS MD DC FALL RISK RISK
For information on Fall & Injury Prevention, visit: [email or web address] OR  [email or web address] OR  https://www.cdc.gov/steadi/patient.html

## 2023-02-22 NOTE — ASU PREOP CHECKLIST - SURGICAL CONSENT
Pt to ed from home via triage with daughters with c/o shortness of breath ongoing and worsenign over past 2 days  Family reports orthopnea and that father cannot sleep supine  Pt denies cough or fever. denies chest pain, NVD  Family also reports difficulty swallowing food, but able to swallow liquids'  Daughters state that pt was discharged to home from [facility] Monday and that have been \"waiting for oxygen all week\". PT AOx3, states \"I feel like a cant breath\"  Pt 88% on room air. SPo2 92 on 2L nc in triage  covid negative last week per family   Skin WDI, respirations even and unlabored. No signs of distress noted. done

## 2023-02-24 LAB — SURGICAL PATHOLOGY STUDY: SIGNIFICANT CHANGE UP

## 2023-02-27 DIAGNOSIS — K21.9 GASTRO-ESOPHAGEAL REFLUX DISEASE WITHOUT ESOPHAGITIS: ICD-10-CM

## 2023-02-27 DIAGNOSIS — K29.50 UNSPECIFIED CHRONIC GASTRITIS WITHOUT BLEEDING: ICD-10-CM

## 2023-02-27 DIAGNOSIS — E78.5 HYPERLIPIDEMIA, UNSPECIFIED: ICD-10-CM

## 2023-04-25 ENCOUNTER — APPOINTMENT (OUTPATIENT)
Dept: DERMATOLOGY | Facility: CLINIC | Age: 38
End: 2023-04-25

## 2023-05-09 ENCOUNTER — NON-APPOINTMENT (OUTPATIENT)
Age: 38
End: 2023-05-09

## 2023-06-15 NOTE — ASU DISCHARGE PLAN (ADULT/PEDIATRIC) - "IF YOU OR YOUR GUARDIAN/FAMILY IS A SMOKER, IT IS IMPORTANT FOR YOUR HEALTH TO STOP SMOKING. PLEASE BE AWARE THAT SECOND HAND SMOKE IS ALSO HARMFUL."
Pt discharged to [facility], nurse Caldera received report, scripts sent to pharmacy, Meds list and AVS explained and educated pt.    Statement Selected

## 2023-08-15 ENCOUNTER — APPOINTMENT (OUTPATIENT)
Dept: INTERNAL MEDICINE | Facility: CLINIC | Age: 38
End: 2023-08-15
Payer: COMMERCIAL

## 2023-08-15 ENCOUNTER — OUTPATIENT (OUTPATIENT)
Dept: OUTPATIENT SERVICES | Facility: HOSPITAL | Age: 38
LOS: 1 days | End: 2023-08-15
Payer: COMMERCIAL

## 2023-08-15 VITALS
TEMPERATURE: 98 F | HEART RATE: 80 BPM | DIASTOLIC BLOOD PRESSURE: 80 MMHG | SYSTOLIC BLOOD PRESSURE: 118 MMHG | OXYGEN SATURATION: 98 % | WEIGHT: 161 LBS | HEIGHT: 62 IN | BODY MASS INDEX: 29.63 KG/M2

## 2023-08-15 VITALS
WEIGHT: 161 LBS | HEIGHT: 62 IN | SYSTOLIC BLOOD PRESSURE: 118 MMHG | DIASTOLIC BLOOD PRESSURE: 80 MMHG | TEMPERATURE: 98 F | HEART RATE: 80 BPM | BODY MASS INDEX: 29.63 KG/M2 | OXYGEN SATURATION: 98 %

## 2023-08-15 DIAGNOSIS — F41.9 ANXIETY DISORDER, UNSPECIFIED: ICD-10-CM

## 2023-08-15 DIAGNOSIS — E78.5 HYPERLIPIDEMIA, UNSPECIFIED: ICD-10-CM

## 2023-08-15 DIAGNOSIS — M62.838 OTHER MUSCLE SPASM: ICD-10-CM

## 2023-08-15 DIAGNOSIS — Z00.00 ENCOUNTER FOR GENERAL ADULT MEDICAL EXAMINATION WITHOUT ABNORMAL FINDINGS: ICD-10-CM

## 2023-08-15 DIAGNOSIS — G47.00 INSOMNIA, UNSPECIFIED: ICD-10-CM

## 2023-08-15 PROCEDURE — 99214 OFFICE O/P EST MOD 30 MIN: CPT

## 2023-08-15 PROCEDURE — 99214 OFFICE O/P EST MOD 30 MIN: CPT | Mod: GC

## 2023-08-15 RX ORDER — ERYTHROMYCIN AND BENZOYL PEROXIDE 3 %-5 %
5-3 KIT TOPICAL TWICE DAILY
Qty: 1 | Refills: 1 | Status: DISCONTINUED | COMMUNITY
Start: 2020-09-01 | End: 2023-08-15

## 2023-08-15 RX ORDER — CLINDAMYCIN PHOSPHATE 1 G/10ML
1 GEL TOPICAL TWICE DAILY
Qty: 2 | Refills: 0 | Status: DISCONTINUED | COMMUNITY
Start: 2023-01-26 | End: 2023-08-15

## 2023-08-15 RX ORDER — BETAMETHASONE DIPROPIONATE 0.5 MG/G
0.05 CREAM TOPICAL DAILY
Qty: 2 | Refills: 0 | Status: DISCONTINUED | COMMUNITY
Start: 2023-01-26 | End: 2023-08-15

## 2023-08-15 RX ORDER — METOPROLOL SUCCINATE 25 MG/1
25 TABLET, EXTENDED RELEASE ORAL DAILY
Qty: 5 | Refills: 0 | Status: DISCONTINUED | COMMUNITY
Start: 2021-11-16 | End: 2023-08-15

## 2023-08-15 RX ORDER — PSYLLIUM SEED
48.57 PACKET (EA) ORAL DAILY
Qty: 1 | Refills: 0 | Status: DISCONTINUED | COMMUNITY
Start: 2022-10-25 | End: 2023-08-15

## 2023-08-15 RX ORDER — HYDROXYZINE HYDROCHLORIDE 50 MG/1
50 TABLET ORAL
Qty: 60 | Refills: 1 | Status: DISCONTINUED | COMMUNITY
Start: 2021-11-22 | End: 2023-08-15

## 2023-08-15 RX ORDER — MULTIVIT-MIN/FOLIC/VIT K/LYCOP 400-300MCG
50 MCG TABLET ORAL DAILY
Qty: 30 | Refills: 3 | Status: DISCONTINUED | COMMUNITY
Start: 2023-02-07 | End: 2023-08-15

## 2023-08-15 RX ORDER — SERTRALINE 25 MG/1
25 TABLET, FILM COATED ORAL
Qty: 30 | Refills: 1 | Status: DISCONTINUED | COMMUNITY
Start: 2021-11-22 | End: 2023-08-15

## 2023-08-15 NOTE — PHYSICAL EXAM
[No Acute Distress] : no acute distress [Well Nourished] : well nourished [Normal Sclera/Conjunctiva] : normal sclera/conjunctiva [Normal Outer Ear/Nose] : the outer ears and nose were normal in appearance [Normal Oropharynx] : the oropharynx was normal [No JVD] : no jugular venous distention [No Lymphadenopathy] : no lymphadenopathy [No Respiratory Distress] : no respiratory distress  [No Accessory Muscle Use] : no accessory muscle use [Normal Rate] : normal rate  [No Carotid Bruits] : no carotid bruits [Soft] : abdomen soft [Non Tender] : non-tender [No CVA Tenderness] : no CVA  tenderness [No Spinal Tenderness] : no spinal tenderness [No Joint Swelling] : no joint swelling [No Rash] : no rash [No Focal Deficits] : no focal deficits [de-identified] : hypertonic b/l trapezius, SCMs and scalenes [de-identified] : see neck

## 2023-08-15 NOTE — REVIEW OF SYSTEMS
[Fever] : no fever [Chills] : no chills [Discharge] : no discharge [Earache] : no earache [Chest Pain] : no chest pain [Shortness Of Breath] : no shortness of breath [Abdominal Pain] : no abdominal pain [Dysuria] : no dysuria [Joint Pain] : no joint pain [Itching] : no itching [Headache] : no headache [Dizziness] : no dizziness [Insomnia] : no insomnia [Easy Bleeding] : no easy bleeding

## 2023-08-15 NOTE — HISTORY OF PRESENT ILLNESS
[FreeTextEntry1] : headaches [de-identified] : 36 yo male with PMHx of numular eczema, acne, Anxiety, recurrent GERD, H. pylori gastritis s/p eradication, chronic LUQ pain + swelling, DLD in office today for routine follow up. He is complaining of headaches, which are more in the morning, relieved by massage, nevr tried any medication for it. He is following with GI for his gastritis and had a CTA with cardio which was negative for any stenosis.

## 2023-08-15 NOTE — ASSESSMENT
[FreeTextEntry1] : 37 year-old male with PMHx of numular eczema, acne vulgaris, Anxiety, recurrent GERD, H. pylori gastritis s/p eradication, chronic LUQ pain + swelling, DLD in office today for routine follow up.  # Headaches - tension HA - muscle spasm - tenderness in the neck - trial of cyclobenzaprine - and OTC tylenol counselled patient on how/when to take medication and about side effects; try massage   # chest pain, atypical # LUQ pain / swelling # GERD - not on any medication - CTA was negative - chest pain likely related to panic attacks  #Anxiety - not on any medication - anxiety reliveing techinques advised Anxiety;  Treatments options and potential side effects discussed  Recommended referral or follow up visit with behavior health provider  Follow up with health care provider within 30 days Depression/Anxiety- Suicide Screening;  Patient denies any suicidal and homicidal ideations at this time.  Patient will follow up with specialist if worsening symptoms.  Information provided on psychiatric ER    # DLD - recommend life style modification for now - repeat lipid panel Hyperlipidemia; Low fat, low cholesterol diet. Discussed importance of eating a heart healthy diet Counseled on aerobic exercise and weight loss Fasting lipid panel every 3 months Treatment options and possible side effects discussed  Smoking cessation discussed, if applicable Patient counseled on effects of ETOH on lipid levels      Flu shot declined  routine labs ordered  RTC in 6 months or PRN.

## 2023-08-16 ENCOUNTER — OUTPATIENT (OUTPATIENT)
Dept: OUTPATIENT SERVICES | Facility: HOSPITAL | Age: 38
LOS: 1 days | End: 2023-08-16
Payer: COMMERCIAL

## 2023-08-16 ENCOUNTER — APPOINTMENT (OUTPATIENT)
Dept: GASTROENTEROLOGY | Facility: CLINIC | Age: 38
End: 2023-08-16
Payer: COMMERCIAL

## 2023-08-16 VITALS
DIASTOLIC BLOOD PRESSURE: 73 MMHG | HEART RATE: 66 BPM | SYSTOLIC BLOOD PRESSURE: 112 MMHG | OXYGEN SATURATION: 99 % | WEIGHT: 165 LBS | BODY MASS INDEX: 30.36 KG/M2 | HEIGHT: 62 IN

## 2023-08-16 DIAGNOSIS — Z00.00 ENCOUNTER FOR GENERAL ADULT MEDICAL EXAMINATION WITHOUT ABNORMAL FINDINGS: ICD-10-CM

## 2023-08-16 PROBLEM — E78.5 HYPERLIPIDEMIA, UNSPECIFIED: Chronic | Status: ACTIVE | Noted: 2023-02-22

## 2023-08-16 PROCEDURE — YYYYY: CPT

## 2023-08-16 NOTE — ASSESSMENT
[FreeTextEntry1] : GERD (gastroesophageal reflux disease) Reports GERD refractory to PPI in the past. currently not on any mediations. symptoms resolved.  s/p EGD w [facility]. acid reflux %: 0.4. SI 0  PLAN Lifestyle modification. continue off PPI  Fatty liver  PLAN:     advised on lifestyle modification.     hepatology referral      risk factros managenet per PMD  Chronic LUQ pain, resolved   Tubular adenoma  ???5 mm TA in last C-scope     PLAN repeat Colonoscopy at age 44  Colon, diverticulosis  ???High fiber diet advised     Avoid constipation  f/u prn

## 2023-08-16 NOTE — HISTORY OF PRESENT ILLNESS
[FreeTextEntry1] : 37-year-old male with PMH of H Pylori is here for follow up for GERD and abdominal pain. pt was seen previously in GI clinic for same symptoms in 2021. Pt reports LUQ and epigastric pain associated with physical movement. Denies any association with food. Denies any nausea or vomiting. report heartburn and regurgitation previously. resolved now. not using any medications. Denies weight loss melena hematemesis or hematochezia. was seen by cardiology for chest pain which deemed noncardiac. had egd with [facility] in feb 2023, SI 0 acid exposure time 0.4%. path unremarkable. today he denies any pain, nausea, vomiting, heartburn, or regurgitation.

## 2023-08-16 NOTE — REVIEW OF SYSTEMS
[As Noted in HPI] : as noted in HPI [Fever] : no fever [Chills] : no chills [Scleral Icterus (Yellow Eyes)] : no scleral icterus [Sore Throat] : no sore throat [Chest Pain] : no chest pain [Palpitations] : no palpitations [SOB on Exertion] : no shortness of breath during exertion [Abdominal Pain] : no abdominal pain [Vomiting] : no vomiting [Constipation] : no constipation [Diarrhea] : no diarrhea [Bleeding] : no bleeding [Bloating (gassiness)] : no bloating [Joint Stiffness] : no joint stiffness [Skin Wound] : no skin wound [Dizziness] : no dizziness [Fainting] : no fainting [Anxiety] : no anxiety [Muscle Weakness] : no muscle weakness [Easy Bruising] : no tendency for easy bruising

## 2023-08-16 NOTE — PHYSICAL EXAM
[Alert] : alert [Normal Voice/Communication] : normal voice/communication [Healthy Appearing] : healthy appearing [No Acute Distress] : no acute distress [Sclera] : the sclera and conjunctiva were normal [Hearing Threshold Finger Rub Not [place]] : hearing was normal [Normal Lips/Gums] : the lips and gums were normal [Oropharynx] : the oropharynx was normal [Normal Appearance] : the appearance of the neck was normal [No Neck Mass] : no neck mass was observed [No Respiratory Distress] : no respiratory distress [No Acc Muscle Use] : no accessory muscle use [Respiration, Rhythm And Depth] : normal respiratory rhythm and effort [Auscultation Breath Sounds / Voice Sounds] : lungs were clear to auscultation bilaterally [Heart Rate And Rhythm] : heart rate was normal and rhythm regular [Normal S1, S2] : normal S1 and S2 [Murmurs] : no murmurs [Bowel Sounds] : normal bowel sounds [Abdomen Tenderness] : non-tender [No Masses] : no abdominal mass palpated [Abdomen Soft] : soft [] : no hepatosplenomegaly [No CVA Tenderness] : no CVA  tenderness [Abnormal Walk] : normal gait [Normal Color / Pigmentation] : normal skin color and pigmentation [Oriented To Time, Place, And Person] : oriented to person, place, and time

## 2023-08-30 DIAGNOSIS — R51.9 HEADACHE, UNSPECIFIED: ICD-10-CM

## 2023-08-30 DIAGNOSIS — G47.00 INSOMNIA, UNSPECIFIED: ICD-10-CM

## 2023-08-30 DIAGNOSIS — M62.838 OTHER MUSCLE SPASM: ICD-10-CM

## 2023-08-30 DIAGNOSIS — F41.9 ANXIETY DISORDER, UNSPECIFIED: ICD-10-CM

## 2023-08-30 DIAGNOSIS — E78.5 HYPERLIPIDEMIA, UNSPECIFIED: ICD-10-CM

## 2023-11-12 ENCOUNTER — EMERGENCY (EMERGENCY)
Facility: HOSPITAL | Age: 38
LOS: 0 days | Discharge: HOME | End: 2023-11-12
Attending: EMERGENCY MEDICINE
Payer: MEDICAID

## 2023-11-12 VITALS
TEMPERATURE: 98 F | WEIGHT: 162.04 LBS | HEART RATE: 81 BPM | OXYGEN SATURATION: 98 % | SYSTOLIC BLOOD PRESSURE: 126 MMHG | DIASTOLIC BLOOD PRESSURE: 75 MMHG | RESPIRATION RATE: 18 BRPM

## 2023-11-12 DIAGNOSIS — R05.1 ACUTE COUGH: ICD-10-CM

## 2023-11-12 PROCEDURE — 99283 EMERGENCY DEPT VISIT LOW MDM: CPT | Mod: 25

## 2023-11-12 PROCEDURE — 71046 X-RAY EXAM CHEST 2 VIEWS: CPT

## 2023-11-12 PROCEDURE — 99284 EMERGENCY DEPT VISIT MOD MDM: CPT

## 2023-11-12 PROCEDURE — 71046 X-RAY EXAM CHEST 2 VIEWS: CPT | Mod: 26

## 2023-11-12 RX ORDER — DEXAMETHASONE 0.5 MG/5ML
10 ELIXIR ORAL ONCE
Refills: 0 | Status: COMPLETED | OUTPATIENT
Start: 2023-11-12 | End: 2023-11-12

## 2023-11-12 RX ADMIN — Medication 10 MILLIGRAM(S): at 18:00

## 2023-11-12 NOTE — ED PROVIDER NOTE - OBJECTIVE STATEMENT
38 year old male, past medical history gerd, hdl, who presents with cough. patient with productive cough with yellow tinged sputum that began x2 weeks ago now progressed to clear tinged sputum. patient reports associated sore throat. denies f/c, hemoptysis, abd pain, nausea/vomiting/diarrhea, rash, leg pain/swelling. non-smoker. patient has been taking mucinex w/o improvement.

## 2023-11-12 NOTE — ED PROVIDER NOTE - NSFOLLOWUPINSTRUCTIONS_ED_ALL_ED_FT
Please follow up with your primary care doctor and pulmonology in 1-3 days  Please be aware of any new or worsening signs or symptoms that should prompt your return to the ER.      Acute Cough    WHAT YOU NEED TO KNOW:    An acute cough can last up to 3 weeks. Common causes of an acute cough include a cold, allergies, or a lung infection.     DISCHARGE INSTRUCTIONS:    Return to the emergency department if:     You have trouble breathing or feel short of breath.      You cough up blood, or you see blood in your mucus.       You faint or feel weak or dizzy.       You have chest pain when you cough or take a deep breath.       You have new wheezing.     Contact your healthcare provider if:     You have a fever.       Your cough lasts longer than 4 weeks.       Your symptoms do not improve with treatment.       You have questions or concerns about your condition or care.     Medicines:     Medicines may be needed to stop the cough, decrease swelling in your airways, or help open your airways. Medicine may also be given to help you cough up mucus. Ask your healthcare provider what over-the-counter medicines you can take. If you have an infection caused by bacteria, you may need antibiotics.       Take your medicine as directed. Contact your healthcare provider if you think your medicine is not helping or if you have side effects. Tell him or her if you are allergic to any medicine. Keep a list of the medicines, vitamins, and herbs you take. Include the amounts, and when and why you take them. Bring the list or the pill bottles to follow-up visits. Carry your medicine list with you in case of an emergency.    Manage your symptoms:     Do not smoke and stay away from others who smoke. Nicotine and other chemicals in cigarettes and cigars can cause lung damage and make your cough worse. Ask your healthcare provider for information if you currently smoke and need help to quit. E-cigarettes or smokeless tobacco still contain nicotine. Talk to your healthcare provider before you use these products.       Drink extra liquids as directed. Liquids will help thin and loosen mucus so you can cough it up. Liquids will also help prevent dehydration. Examples of good liquids to drink include water, fruit juice, and broth. Do not drink liquids that contain caffeine. Caffeine can increase your risk for dehydration. Ask your healthcare provider how much liquid to drink each day.       Rest as directed. Do not do activities that make your cough worse, such as exercise.       Use a humidifier or vaporizer. Use a cool mist humidifier or a vaporizer to increase air moisture in your home. This may make it easier for you to breathe and help decrease your cough.       Eat 2 to 5 mL of honey 2 times each day. Honey can help thin mucus and decrease your cough.       Use cough drops or lozenges. These can help decrease throat irritation and your cough.     Follow up with your healthcare provider as directed: Write down your questions so you remember to ask them during your visits.        © Copyright [employer] 2019 All illustrations and images included in CareNotes are the copyrighted property of [employer] or [employer]. Please follow up with your primary care doctor and pulmonology in 1-3 days  Please be aware of any new or worsening signs or symptoms that should prompt your return to the ER.    Our Emergency Department Referral Coordinators will be reaching out to you in the next 24-48 hours from 9:00am to 5:00pm with a follow up appointment. Please expect a phone call from the hospital in that time frame. If you do not receive a call or if you have any questions or concerns, you can reach them at   [phone].    Acute Cough    WHAT YOU NEED TO KNOW:    An acute cough can last up to 3 weeks. Common causes of an acute cough include a cold, allergies, or a lung infection.     DISCHARGE INSTRUCTIONS:    Return to the emergency department if:     You have trouble breathing or feel short of breath.      You cough up blood, or you see blood in your mucus.       You faint or feel weak or dizzy.       You have chest pain when you cough or take a deep breath.       You have new wheezing.     Contact your healthcare provider if:     You have a fever.       Your cough lasts longer than 4 weeks.       Your symptoms do not improve with treatment.       You have questions or concerns about your condition or care.     Medicines:     Medicines may be needed to stop the cough, decrease swelling in your airways, or help open your airways. Medicine may also be given to help you cough up mucus. Ask your healthcare provider what over-the-counter medicines you can take. If you have an infection caused by bacteria, you may need antibiotics.       Take your medicine as directed. Contact your healthcare provider if you think your medicine is not helping or if you have side effects. Tell him or her if you are allergic to any medicine. Keep a list of the medicines, vitamins, and herbs you take. Include the amounts, and when and why you take them. Bring the list or the pill bottles to follow-up visits. Carry your medicine list with you in case of an emergency.    Manage your symptoms:     Do not smoke and stay away from others who smoke. Nicotine and other chemicals in cigarettes and cigars can cause lung damage and make your cough worse. Ask your healthcare provider for information if you currently smoke and need help to quit. E-cigarettes or smokeless tobacco still contain nicotine. Talk to your healthcare provider before you use these products.       Drink extra liquids as directed. Liquids will help thin and loosen mucus so you can cough it up. Liquids will also help prevent dehydration. Examples of good liquids to drink include water, fruit juice, and broth. Do not drink liquids that contain caffeine. Caffeine can increase your risk for dehydration. Ask your healthcare provider how much liquid to drink each day.       Rest as directed. Do not do activities that make your cough worse, such as exercise.       Use a humidifier or vaporizer. Use a cool mist humidifier or a vaporizer to increase air moisture in your home. This may make it easier for you to breathe and help decrease your cough.       Eat 2 to 5 mL of honey 2 times each day. Honey can help thin mucus and decrease your cough.       Use cough drops or lozenges. These can help decrease throat irritation and your cough.     Follow up with your healthcare provider as directed: Write down your questions so you remember to ask them during your visits.        © Copyright [employer] 2019 All illustrations and images included in CareNotes are the copyrighted property of [employer] or [employer].

## 2023-11-12 NOTE — ED PROVIDER NOTE - NSPTACCESSSVCSAPPTDETAILS_ED_ALL_ED_FT
please have patient fu with pulmonologist for persistent cough. please have patient fu with pulmonologist for persistent cough.    pt also needs pmd.

## 2023-11-12 NOTE — ED PROVIDER NOTE - WR ORDER ID 1
[id number] Protopic Pregnancy And Lactation Text: This medication is Pregnancy Category C. It is unknown if this medication is excreted in breast milk when applied topically.

## 2023-11-12 NOTE — ED PROVIDER NOTE - ATTENDING APP SHARED VISIT CONTRIBUTION OF CARE
38-year-old male with past medical history of hyperlipidemia, GERD presents with productive cough over 2 weeks initially started with green sputum now clear and dry, has been using Mucinex with mild relief.  Patient reports associated symptoms of rhinorrhea, congestion.  Denies smoking.  No hemoptysis, no recent travel, no family history of PE, DVT, clotting disorders.  No fever, chills, n/v, cp, sob, pleuritic cp, palpitations, diaphoresis, sore throat, ear pain, neck pain/stiffness, abd pain, diarrhea, constipation, urinary symptoms, trauma, ha/lh/dizziness, numbness/tingling, sick contacts, recent travel, or rash.     on exam: non toxic appearing pt sitting on stretcher in nad, speaking full sentences, no rash, mmm, mild erythema to posterior pharynx, exudates, or petechiae, no rhinorrhea, TM's visualized b/l with good cone of light, no erythema or effusions, RRR, radial pulses 2/4 b/l, no jvd, adventitious lung sounds, ctabl w/ breath sounds present b/l, no wheezing or crackles, no accessory muscle use, no tachypnea, no stridor, bs present throughout all 4 quadrants, abd soft, nd, nt, no rebound tenderness or guarding, no cvat, no pelvic pain to palpation, no suprapubic pain to palpation, FROM of ext, no edema, no calf pain/swelling/erythema, AAOx3.  motor 5/5 and sensation intact throughout upper and lower ext. No focal deficits.    Plan: Decadron, cxr, reassess.

## 2023-11-12 NOTE — ED PROVIDER NOTE - PATIENT PORTAL LINK FT
You can access the FollowMyHealth Patient Portal offered by [facility] by registering at the following website: [email or web address]. By joining [employer]’s FollowMyHealth portal, you will also be able to view your health information using other applications (apps) compatible with our system.

## 2023-11-12 NOTE — ED PROVIDER NOTE - PROGRESS NOTE DETAILS
ED Attending LOIS Davis  saturation 98 at rest and with exertion.  Patient feeling better, aware of symptomatic treatment, signs and symptoms to return for, will follow-up with pulmonary and PMD.

## 2023-11-12 NOTE — ED PROVIDER NOTE - CARE PROVIDER_API CALL
Fady Dewey  Pulmonary Disease  [address] [place] [address]  Phone: [phone]  Fax: [phone]  Follow Up Time: 1-3 Days    Troy Rangel  [address], [facility] - Room [address] [place] [address]  Phone: [phone]  Fax: [phone]  Follow Up Time: 1-3 Days

## 2023-11-12 NOTE — ED PROVIDER NOTE - CLINICAL SUMMARY MEDICAL DECISION MAKING FREE TEXT BOX
38-year-old male with past medical history of hyperlipidemia, GERD presents with productive cough over 2 weeks initially started with green sputum now clear and dry, has been using Mucinex with mild relief.  Patient reports associated symptoms of rhinorrhea, congestion.  Denies smoking.  No hemoptysis, no recent travel, no family history of PE, DVT, clotting disorders.  No fever, chills, n/v, cp, sob, pleuritic cp, palpitations, diaphoresis, sore throat, ear pain, neck pain/stiffness, abd pain, diarrhea, constipation, urinary symptoms, trauma, ha/lh/dizziness, numbness/tingling, sick contacts, recent travel, or rash.     on exam: non toxic appearing pt sitting on stretcher in nad, speaking full sentences, no rash, mmm, mild erythema to posterior pharynx, exudates, or petechiae, no rhinorrhea, TM's visualized b/l with good cone of light, no erythema or effusions, RRR, radial pulses 2/4 b/l, no jvd, adventitious lung sounds, ctabl w/ breath sounds present b/l, no wheezing or crackles, no accessory muscle use, no tachypnea, no stridor, bs present throughout all 4 quadrants, abd soft, nd, nt, no rebound tenderness or guarding, no cvat, no pelvic pain to palpation, no suprapubic pain to palpation, FROM of ext, no edema, no calf pain/swelling/erythema, AAOx3.  motor 5/5 and sensation intact throughout upper and lower ext. No focal deficits.    Plan: Decadron, cxr, reassess.    Imaging was ordered and reviewed by me.  Appropriate medications for patient's presenting complaints were ordered and effects were reassessed.  Patient's records (prior hospital, ED visit) were reviewed. 38-year-old male with past medical history of hyperlipidemia, GERD presents with productive cough over 2 weeks initially started with green sputum now clear and dry, has been using Mucinex with mild relief.  Patient reports associated symptoms of rhinorrhea, congestion.  Denies smoking.  No hemoptysis, no recent travel, no family history of PE, DVT, clotting disorders.  No fever, chills, n/v, cp, sob, pleuritic cp, palpitations, diaphoresis, sore throat, ear pain, neck pain/stiffness, abd pain, diarrhea, constipation, urinary symptoms, trauma, ha/lh/dizziness, numbness/tingling, sick contacts, recent travel, or rash.     on exam: non toxic appearing pt sitting on stretcher in nad, speaking full sentences, no rash, mmm, mild erythema to posterior pharynx, exudates, or petechiae, no rhinorrhea, TM's visualized b/l with good cone of light, no erythema or effusions, RRR, radial pulses 2/4 b/l, no jvd, adventitious lung sounds, ctabl w/ breath sounds present b/l, no wheezing or crackles, no accessory muscle use, no tachypnea, no stridor, bs present throughout all 4 quadrants, abd soft, nd, nt, no rebound tenderness or guarding, no cvat, no pelvic pain to palpation, no suprapubic pain to palpation, FROM of ext, no edema, no calf pain/swelling/erythema, AAOx3.  motor 5/5 and sensation intact throughout upper and lower ext. No focal deficits.    Plan: Decadron, cxr, reassess.    Imaging was ordered and reviewed by me.  Appropriate medications for patient's presenting complaints were ordered and effects were reassessed.  Patient's records (prior hospital, ED visit) were reviewed. Escalation to admission/observation was considered. However patient feels much better and is comfortable with discharge.  Appropriate follow-up was arranged.  saturation 98 at rest and with exertion.  Patient feeling better, aware of symptomatic treatment, signs and symptoms to return for, will follow-up with pulmonary and PMD.    Supportive care and home care discussed in detail. Patient aware they may have to return for re-evaluation and possible admission if outpatient treatment fails. Strict return precautions discussed.

## 2023-11-12 NOTE — ED PROVIDER NOTE - PROVIDER TOKENS
PROVIDER:[TOKEN:[[id number]],FOLLOWUP:[1-3 Days]],PROVIDER:[TOKEN:[[id number]],FOLLOWUP:[1-3 Days]]

## 2023-11-12 NOTE — ED ADULT NURSE NOTE - NSFALLUNIVINTERV_ED_ALL_ED
Bed/Stretcher in lowest position, wheels locked, appropriate side rails in place/Call bell, personal items and telephone in reach/Instruct patient to call for assistance before getting out of bed/chair/stretcher/Non-slip footwear applied when patient is off stretcher/[place] to call system/Physically safe environment - no spills, clutter or unnecessary equipment/Purposeful proactive rounding/Room/bathroom lighting operational, light cord in reach

## 2023-11-14 ENCOUNTER — OUTPATIENT (OUTPATIENT)
Dept: OUTPATIENT SERVICES | Facility: HOSPITAL | Age: 38
LOS: 1 days | End: 2023-11-14
Payer: COMMERCIAL

## 2023-11-14 ENCOUNTER — APPOINTMENT (OUTPATIENT)
Dept: PULMONOLOGY | Facility: CLINIC | Age: 38
End: 2023-11-14
Payer: COMMERCIAL

## 2023-11-14 VITALS
WEIGHT: 170 LBS | HEART RATE: 72 BPM | TEMPERATURE: 98 F | DIASTOLIC BLOOD PRESSURE: 82 MMHG | SYSTOLIC BLOOD PRESSURE: 128 MMHG | OXYGEN SATURATION: 98 % | BODY MASS INDEX: 31.28 KG/M2 | HEIGHT: 62 IN

## 2023-11-14 VITALS — SYSTOLIC BLOOD PRESSURE: 112 MMHG | DIASTOLIC BLOOD PRESSURE: 73 MMHG

## 2023-11-14 DIAGNOSIS — R07.9 CHEST PAIN, UNSPECIFIED: ICD-10-CM

## 2023-11-14 DIAGNOSIS — Z00.00 ENCOUNTER FOR GENERAL ADULT MEDICAL EXAMINATION WITHOUT ABNORMAL FINDINGS: ICD-10-CM

## 2023-11-14 DIAGNOSIS — Z87.898 PERSONAL HISTORY OF OTHER SPECIFIED CONDITIONS: ICD-10-CM

## 2023-11-14 PROCEDURE — 99204 OFFICE O/P NEW MOD 45 MIN: CPT | Mod: GC

## 2023-11-14 PROCEDURE — 99204 OFFICE O/P NEW MOD 45 MIN: CPT

## 2023-11-17 ENCOUNTER — APPOINTMENT (OUTPATIENT)
Dept: INTERNAL MEDICINE | Facility: CLINIC | Age: 38
End: 2023-11-17

## 2023-11-17 ENCOUNTER — OUTPATIENT (OUTPATIENT)
Dept: OUTPATIENT SERVICES | Facility: HOSPITAL | Age: 38
LOS: 1 days | End: 2023-11-17
Payer: COMMERCIAL

## 2023-11-17 VITALS
SYSTOLIC BLOOD PRESSURE: 112 MMHG | DIASTOLIC BLOOD PRESSURE: 76 MMHG | BODY MASS INDEX: 30.73 KG/M2 | HEART RATE: 99 BPM | WEIGHT: 167 LBS | OXYGEN SATURATION: 95 % | HEIGHT: 62 IN

## 2023-11-17 DIAGNOSIS — Z00.00 ENCOUNTER FOR GENERAL ADULT MEDICAL EXAMINATION WITHOUT ABNORMAL FINDINGS: ICD-10-CM

## 2023-11-17 PROCEDURE — 99213 OFFICE O/P EST LOW 20 MIN: CPT

## 2023-11-20 DIAGNOSIS — R07.9 CHEST PAIN, UNSPECIFIED: ICD-10-CM

## 2023-11-20 DIAGNOSIS — R05.8 OTHER SPECIFIED COUGH: ICD-10-CM

## 2023-11-21 DIAGNOSIS — Z87.09 PERSONAL HISTORY OF OTHER DISEASES OF THE RESPIRATORY SYSTEM: ICD-10-CM

## 2023-11-21 DIAGNOSIS — K21.9 GASTRO-ESOPHAGEAL REFLUX DISEASE WITHOUT ESOPHAGITIS: ICD-10-CM

## 2024-01-28 ENCOUNTER — EMERGENCY (EMERGENCY)
Facility: HOSPITAL | Age: 39
LOS: 0 days | Discharge: HOME | End: 2024-01-28
Attending: STUDENT IN AN ORGANIZED HEALTH CARE EDUCATION/TRAINING PROGRAM
Payer: MEDICAID

## 2024-01-28 VITALS
HEART RATE: 102 BPM | DIASTOLIC BLOOD PRESSURE: 85 MMHG | RESPIRATION RATE: 18 BRPM | TEMPERATURE: 98 F | OXYGEN SATURATION: 97 % | SYSTOLIC BLOOD PRESSURE: 137 MMHG | WEIGHT: 164.02 LBS

## 2024-01-28 DIAGNOSIS — R05.1 ACUTE COUGH: ICD-10-CM

## 2024-01-28 DIAGNOSIS — J02.0 STREPTOCOCCAL PHARYNGITIS: ICD-10-CM

## 2024-01-28 DIAGNOSIS — R50.9 FEVER, UNSPECIFIED: ICD-10-CM

## 2024-01-28 LAB
FLUAV AG NPH QL: SIGNIFICANT CHANGE UP
FLUBV AG NPH QL: SIGNIFICANT CHANGE UP
RSV RNA NPH QL NAA+NON-PROBE: SIGNIFICANT CHANGE UP
SARS-COV-2 RNA SPEC QL NAA+PROBE: SIGNIFICANT CHANGE UP

## 2024-01-28 PROCEDURE — 71046 X-RAY EXAM CHEST 2 VIEWS: CPT | Mod: 26

## 2024-01-28 PROCEDURE — 99283 EMERGENCY DEPT VISIT LOW MDM: CPT | Mod: 25

## 2024-01-28 PROCEDURE — 71046 X-RAY EXAM CHEST 2 VIEWS: CPT

## 2024-01-28 PROCEDURE — 99284 EMERGENCY DEPT VISIT MOD MDM: CPT

## 2024-01-28 PROCEDURE — 0241U: CPT

## 2024-01-28 RX ORDER — DEXAMETHASONE 0.5 MG/5ML
10 ELIXIR ORAL ONCE
Refills: 0 | Status: COMPLETED | OUTPATIENT
Start: 2024-01-28 | End: 2024-01-28

## 2024-01-28 RX ADMIN — Medication 10 MILLIGRAM(S): at 20:35

## 2024-01-28 NOTE — ED PROVIDER NOTE - OBJECTIVE STATEMENT
37yo male no past med hx presenitng for eval of cough for the last 20 days. Patient states that he has had intermittent fevers during this time including fever yesterday. He denies any chest pain but requests xray of chest because of length of cough as well as color change from clear to yellow mucus over the last 2 weeks. He denies any recent travel, sick contacts, nausea, vomiting, SOB, leg swelling, prolonged travel.

## 2024-01-28 NOTE — ED PROVIDER NOTE - NSFOLLOWUPINSTRUCTIONS_ED_ALL_ED_FT
ANTIBIOTIC SENT TO YOUR PHARMACY   PLEASE TAKE AUGMENTIN 2 TIMES PER DAY FOR THE NEXT 10 DAYS    Strep Throat    Strep throat is an infection of the throat. It is caused by germs. Strep throat spreads from person to person because of coughing, sneezing, or close contact.    Follow these instructions at home:  Medicines     Take over-the-counter and prescription medicines only as told by your doctor.  Take your antibiotic medicine as told by your doctor. Do not stop taking the medicine even if you feel better.  Have family members who also have a sore throat or fever go to a doctor.  Eating and drinking     Do not share food, drinking cups, or personal items.  Try eating soft foods until your sore throat feels better.  Drink enough fluid to keep your pee (urine) clear or pale yellow.  General instructions     Rinse your mouth (gargle) with a salt-water mixture 3–4 times per day or as needed. To make a salt-water mixture, stir ½–1 tsp of salt into 1 cup of warm water.  Make sure that all people in your house wash their hands well.  Rest.  Stay home from school or work until you have been taking antibiotics for 24 hours.  Keep all follow-up visits as told by your doctor. This is important.    Contact a doctor if:  Your neck keeps getting bigger.  You get a rash, cough, or earache.  You cough up thick liquid that is green, yellow-brown, or bloody.  You have pain that does not get better with medicine.  Your problems get worse instead of getting better.  You have a fever.  Get help right away if:  You throw up (vomit).  You get a very bad headache.  You neck hurts or it feels stiff.  You have chest pain or you are short of breath.  You have drooling, very bad throat pain, or changes in your voice.  Your neck is swollen or the skin gets red and tender.  Your mouth is dry or you are peeing less than normal.  You keep feeling more tired or it is hard to wake up.  Your joints are red or they hurt.    This information is not intended to replace advice given to you by your health care provider. Make sure you discuss any questions you have with your health care provider.

## 2024-01-28 NOTE — ED PROVIDER NOTE - PATIENT PORTAL LINK FT
You can access the FollowMyHealth Patient Portal offered by [facility] by registering at the following website: [email or web address]. By joining [employer]’s FollowMyHealth portal, you will also be able to view your health information using other applications (apps) compatible with our system.

## 2024-01-28 NOTE — ED ADULT NURSE NOTE - BIRTH SEX
Pt requesting valtrex refill. Has not been seen in clinic since 2/2017. Advised that she may need to come in for her annual first.  
Male

## 2024-01-28 NOTE — ED PROVIDER NOTE - PHYSICAL EXAMINATION
VITAL SIGNS: I have reviewed nursing notes and confirm.  CONSTITUTIONAL: well-appearing, non-toxic, NAD  SKIN: Warm dry, normal skin turgor  HEAD: NCAT  EYES: EOMI, PERRLA, no scleral icterus  ENT: Moist mucous membranes, (+) posterior pharynx erythematous with 2 exudates  NECK: Supple; non tender. Full ROM. (+) bilateral cervical LAD  CARD: RRR, no murmurs, rubs or gallops  RESP: clear to ausculation b/l.  No rales, rhonchi, or wheezing.  ABD: soft, + BS, non-tender, non-distended, no rebound or guarding. No CVA tenderness  EXT: Full ROM, no bony tenderness, no pedal edema, no calf tenderness  PSYCH: Cooperative, appropriate.

## 2024-01-28 NOTE — ED ADULT NURSE NOTE - NSFALLUNIVINTERV_ED_ALL_ED
Bed/Stretcher in lowest position, wheels locked, appropriate side rails in place/Call bell, personal items and telephone in reach/Instruct patient to call for assistance before getting out of bed/chair/stretcher/Non-slip footwear applied when patient is off stretcher/[place] to call system/Physically safe environment - no spills, clutter or unnecessary equipment/Purposeful proactive rounding/Room/bathroom lighting operational, light cord in reach

## 2024-02-02 NOTE — ED PROVIDER NOTE - WR ORDER NAME 1
Xray Neck Soft Tissue
This was a shared visit with the STONE. I reviewed and verified the documentation.

## 2024-02-12 ENCOUNTER — APPOINTMENT (OUTPATIENT)
Age: 39
End: 2024-02-12
Payer: COMMERCIAL

## 2024-02-12 ENCOUNTER — LABORATORY RESULT (OUTPATIENT)
Age: 39
End: 2024-02-12

## 2024-02-12 ENCOUNTER — OUTPATIENT (OUTPATIENT)
Dept: OUTPATIENT SERVICES | Facility: HOSPITAL | Age: 39
LOS: 1 days | End: 2024-02-12
Payer: COMMERCIAL

## 2024-02-12 VITALS
BODY MASS INDEX: 29.81 KG/M2 | SYSTOLIC BLOOD PRESSURE: 111 MMHG | WEIGHT: 162 LBS | HEART RATE: 69 BPM | HEIGHT: 62 IN | DIASTOLIC BLOOD PRESSURE: 74 MMHG | OXYGEN SATURATION: 97 %

## 2024-02-12 DIAGNOSIS — R74.8 ABNORMAL LEVELS OF OTHER SERUM ENZYMES: ICD-10-CM

## 2024-02-12 DIAGNOSIS — Z00.00 ENCOUNTER FOR GENERAL ADULT MEDICAL EXAMINATION WITHOUT ABNORMAL FINDINGS: ICD-10-CM

## 2024-02-12 DIAGNOSIS — K76.0 FATTY (CHANGE OF) LIVER, NOT ELSEWHERE CLASSIFIED: ICD-10-CM

## 2024-02-12 DIAGNOSIS — E78.5 HYPERLIPIDEMIA, UNSPECIFIED: ICD-10-CM

## 2024-02-12 PROCEDURE — 86708 HEPATITIS A ANTIBODY: CPT

## 2024-02-12 PROCEDURE — 80074 ACUTE HEPATITIS PANEL: CPT

## 2024-02-12 PROCEDURE — 99204 OFFICE O/P NEW MOD 45 MIN: CPT

## 2024-02-12 PROCEDURE — 86706 HEP B SURFACE ANTIBODY: CPT

## 2024-02-12 PROCEDURE — 86038 ANTINUCLEAR ANTIBODIES: CPT

## 2024-02-12 PROCEDURE — 80053 COMPREHEN METABOLIC PANEL: CPT

## 2024-02-12 PROCEDURE — 80061 LIPID PANEL: CPT

## 2024-02-12 PROCEDURE — 86381 MITOCHONDRIAL ANTIBODY EACH: CPT

## 2024-02-12 PROCEDURE — 86709 HEPATITIS A IGM ANTIBODY: CPT

## 2024-02-12 PROCEDURE — 86255 FLUORESCENT ANTIBODY SCREEN: CPT

## 2024-02-12 PROCEDURE — 83036 HEMOGLOBIN GLYCOSYLATED A1C: CPT

## 2024-02-12 PROCEDURE — 82103 ALPHA-1-ANTITRYPSIN TOTAL: CPT

## 2024-02-12 PROCEDURE — 82784 ASSAY IGA/IGD/IGG/IGM EACH: CPT

## 2024-02-12 PROCEDURE — 82390 ASSAY OF CERULOPLASMIN: CPT

## 2024-02-12 PROCEDURE — 82104 ALPHA-1-ANTITRYPSIN PHENO: CPT

## 2024-02-13 PROBLEM — R74.8 ELEVATED LIVER ENZYMES: Status: ACTIVE | Noted: 2024-02-13

## 2024-02-13 LAB
ALBUMIN SERPL ELPH-MCNC: 4.4 G/DL
ALP BLD-CCNC: 91 U/L
ALT SERPL-CCNC: 33 U/L
ANION GAP SERPL CALC-SCNC: 12 MMOL/L
AST SERPL-CCNC: 21 U/L
BILIRUB SERPL-MCNC: 0.4 MG/DL
BUN SERPL-MCNC: 12 MG/DL
CALCIUM SERPL-MCNC: 9 MG/DL
CERULOPLASMIN SERPL-MCNC: 26 MG/DL
CHLORIDE SERPL-SCNC: 103 MMOL/L
CHOLEST SERPL-MCNC: 201 MG/DL
CO2 SERPL-SCNC: 25 MMOL/L
CREAT SERPL-MCNC: 0.9 MG/DL
DEPRECATED KAPPA LC FREE/LAMBDA SER: 1.01 RATIO
EGFR: 111 ML/MIN/1.73M2
ESTIMATED AVERAGE GLUCOSE: 117 MG/DL
GLUCOSE SERPL-MCNC: 99 MG/DL
HAV IGM SER QL: NONREACTIVE
HBA1C MFR BLD HPLC: 5.7 %
HBV CORE IGM SER QL: NONREACTIVE
HBV SURFACE AB SER QL: REACTIVE
HBV SURFACE AG SER QL: NONREACTIVE
HCV AB SER QL: NONREACTIVE
HCV S/CO RATIO: 0.08 S/CO
HDLC SERPL-MCNC: 41 MG/DL
HEPATITIS A IGG ANTIBODY: REACTIVE
IGA SER QL IEP: 300 MG/DL
IGG SER QL IEP: 1170 MG/DL
IGM SER QL IEP: 177 MG/DL
KAPPA LC CSF-MCNC: 1.98 MG/DL
KAPPA LC SERPL-MCNC: 2 MG/DL
LDLC SERPL CALC-MCNC: 134 MG/DL
NONHDLC SERPL-MCNC: 160 MG/DL
POTASSIUM SERPL-SCNC: 4.7 MMOL/L
PROT SERPL-MCNC: 7 G/DL
SODIUM SERPL-SCNC: 140 MMOL/L
TRIGL SERPL-MCNC: 130 MG/DL

## 2024-02-13 NOTE — ASSESSMENT
[FreeTextEntry1] : 38-year-old male patient known to have hepatic steatosis, DL, GERD, was referred by cardiology for Liver MRI because there was an incidental finding on the CT Angio that showed 8mm Liver lesion. Patient denies any symptoms.  #Hepatic steatosis -ALT 59, CT scan shows fatty liver -no symptoms Plan: -fibroscan -repeat CMP and acute hepatitis panel  -autoimmune panel -f/u in 3 months  #Liver lesion -incidental on CT Angio -MRI Liver with IV contrast

## 2024-02-13 NOTE — PHYSICAL EXAM
[General Appearance - In No Acute Distress] : in no acute distress [Sclera] : the sclera and conjunctiva were normal [Outer Ear] : the ears and nose were normal in appearance [Hearing Threshold Finger Rub Not [place]] : hearing was normal [Neck Appearance] : the appearance of the neck was normal [] : no respiratory distress [Exaggerated Use Of Accessory Muscles For Inspiration] : no accessory muscle use [Heart Rate And Rhythm] : heart rate was normal and rhythm regular [Heart Sounds] : normal S1 and S2 [Edema] : there was no peripheral edema [Bowel Sounds] : normal bowel sounds [Abdomen Soft] : soft [Abdomen Tenderness] : non-tender [No CVA Tenderness] : no ~M costovertebral angle tenderness [Abnormal Walk] : normal gait [Skin Color & Pigmentation] : normal skin color and pigmentation [Oriented To Time, Place, And Person] : oriented to person, place, and time [Scleral Icterus] : No Scleral Icterus [Spider Angioma] : No spider angioma(s) were observed [Abdominal Bruit] : no abdominal bruit [Abdominal  Ascites] : no ascites [Splenomegaly] : no splenomegaly [Caput Medusae] : no caput medusae observed [Asterixis] : no asterixis observed [Jaundice] : No jaundice [Palmar Erythema] : no Palmar Erythema [Depression] : no depression [Hallucinations] : ~T no ~M hallucinations

## 2024-02-13 NOTE — HISTORY OF PRESENT ILLNESS
[FreeTextEntry1] : 38-year-old male patient known to have hepatic steatosis, DL, GERD, was referred by cardiology for Liver MRI because there was an incidental finding on the CT Angio that showed 8mm Liver lesion. Patient denies any symptoms.

## 2024-02-13 NOTE — END OF VISIT
[] : Resident [FreeTextEntry3] : 38 y o  M referred for liver lesion seen on coronary CT Elevated ALT wth BMI 29 -20 and HLD and steatosis on imaging. Liver lesion  ? hemangioma MRI abdomen Fibroscan CLD work up [Time Spent: ___ minutes] : I have spent [unfilled] minutes of time on the encounter.

## 2024-02-13 NOTE — ED ADULT NURSE NOTE - DISCHARGE DATE/TIME
Problem: Discharge Planning  Goal: Discharge to home or other facility with appropriate resources  2/13/2024 1030 by Lidia Pederson RN  Outcome: Progressing  Flowsheets (Taken 2/13/2024 0844)  Discharge to home or other facility with appropriate resources:   Identify barriers to discharge with patient and caregiver   Arrange for needed discharge resources and transportation as appropriate   Identify discharge learning needs (meds, wound care, etc)   Refer to discharge planning if patient needs post-hospital services based on physician order or complex needs related to functional status, cognitive ability or social support system     Problem: Safety - Adult  Goal: Free from fall injury  2/13/2024 1030 by Lidia Pederson RN  Outcome: Progressing  Flowsheets (Taken 2/13/2024 0843)  Free From Fall Injury: Instruct family/caregiver on patient safety     Problem: ABCDS Injury Assessment  Goal: Absence of physical injury  2/13/2024 1030 by Lidia Pederson RN  Outcome: Progressing  Flowsheets (Taken 2/13/2024 0843)  Absence of Physical Injury: Implement safety measures based on patient assessment     Problem: Pain  Goal: Verbalizes/displays adequate comfort level or baseline comfort level  2/13/2024 1030 by Lidia Pederson RN  Outcome: Progressing  Flowsheets (Taken 2/13/2024 0844)  Verbalizes/displays adequate comfort level or baseline comfort level:   Encourage patient to monitor pain and request assistance   Assess pain using appropriate pain scale     Problem: Nutrition Deficit:  Goal: Optimize nutritional status  2/13/2024 1030 by Lidia Pederson RN  Outcome: Progressing     Problem: Skin/Tissue Integrity  Goal: Absence of new skin breakdown  Description: 1.  Monitor for areas of redness and/or skin breakdown  2.  Assess vascular access sites hourly  3.  Every 4-6 hours minimum:  Change oxygen saturation probe site  4.  Every 4-6 hours:  If on nasal continuous positive airway pressure, respiratory therapy  assess nares and determine need for appliance change or resting period.  2/13/2024 1030 by Lidia Pederson, RN  Outcome: Progressing      08-May-2020 18:37

## 2024-02-16 DIAGNOSIS — R74.8 ABNORMAL LEVELS OF OTHER SERUM ENZYMES: ICD-10-CM

## 2024-02-16 DIAGNOSIS — K76.0 FATTY (CHANGE OF) LIVER, NOT ELSEWHERE CLASSIFIED: ICD-10-CM

## 2024-02-16 DIAGNOSIS — E78.5 HYPERLIPIDEMIA, UNSPECIFIED: ICD-10-CM

## 2024-02-16 LAB
ANA SER IF-ACNC: NEGATIVE
MITOCHONDRIA AB SER IF-ACNC: NORMAL
SMOOTH MUSCLE AB SER QL IF: NORMAL

## 2024-02-20 ENCOUNTER — APPOINTMENT (OUTPATIENT)
Dept: INTERNAL MEDICINE | Facility: CLINIC | Age: 39
End: 2024-02-20
Payer: COMMERCIAL

## 2024-02-20 ENCOUNTER — OUTPATIENT (OUTPATIENT)
Dept: OUTPATIENT SERVICES | Facility: HOSPITAL | Age: 39
LOS: 1 days | End: 2024-02-20
Payer: COMMERCIAL

## 2024-02-20 VITALS
HEIGHT: 62 IN | SYSTOLIC BLOOD PRESSURE: 111 MMHG | WEIGHT: 165 LBS | OXYGEN SATURATION: 98 % | HEART RATE: 70 BPM | DIASTOLIC BLOOD PRESSURE: 74 MMHG | BODY MASS INDEX: 30.36 KG/M2

## 2024-02-20 DIAGNOSIS — R07.89 OTHER CHEST PAIN: ICD-10-CM

## 2024-02-20 DIAGNOSIS — Z00.00 ENCOUNTER FOR GENERAL ADULT MEDICAL EXAMINATION WITHOUT ABNORMAL FINDINGS: ICD-10-CM

## 2024-02-20 DIAGNOSIS — R51.9 HEADACHE, UNSPECIFIED: ICD-10-CM

## 2024-02-20 PROCEDURE — 99213 OFFICE O/P EST LOW 20 MIN: CPT

## 2024-02-20 RX ORDER — AZITHROMYCIN 250 MG/1
250 TABLET, FILM COATED ORAL
Qty: 1 | Refills: 0 | Status: ACTIVE | COMMUNITY
Start: 2024-02-20 | End: 1900-01-01

## 2024-02-20 RX ORDER — ALBUTEROL SULFATE 90 UG/1
108 (90 BASE) INHALANT RESPIRATORY (INHALATION)
Qty: 1 | Refills: 2 | Status: ACTIVE | COMMUNITY
Start: 2024-02-20 | End: 1900-01-01

## 2024-02-20 RX ORDER — BENZONATATE 100 MG/1
100 CAPSULE ORAL 3 TIMES DAILY
Qty: 30 | Refills: 0 | Status: DISCONTINUED | COMMUNITY
Start: 2023-11-14 | End: 2024-02-20

## 2024-02-20 RX ORDER — CYCLOBENZAPRINE HYDROCHLORIDE 5 MG/1
5 TABLET, FILM COATED ORAL
Qty: 60 | Refills: 0 | Status: DISCONTINUED | COMMUNITY
Start: 2023-08-15 | End: 2024-02-20

## 2024-02-20 NOTE — PHYSICAL EXAM
[No Acute Distress] : no acute distress [Supple] : supple [No Accessory Muscle Use] : no accessory muscle use [Normal Rate] : normal rate  [Regular Rhythm] : with a regular rhythm [Normal S1, S2] : normal S1 and S2 [No Edema] : there was no peripheral edema [Soft] : abdomen soft [Non Tender] : non-tender [No CVA Tenderness] : no CVA  tenderness [Normal] : no rash [No Focal Deficits] : no focal deficits [Normal Gait] : normal gait [Normal Affect] : the affect was normal [de-identified] : mild pharyngeal erythema noted. Mild erythema noted in b/l TMs. no francois exudates [de-identified] : diffuse left sided rhonchi, tight without focal wheezing otherwise comfortable appearing on RA, no cyanosis [de-identified] :  mild tenderness to palpation at cervical lymph nodes.

## 2024-02-20 NOTE — REVIEW OF SYSTEMS
[Fever] : fever [Chills] : chills [Fatigue] : fatigue [Nasal Discharge] : nasal discharge [Sore Throat] : sore throat [Shortness Of Breath] : shortness of breath [Wheezing] : wheezing [Cough] : cough [Headache] : headache [Negative] : Psychiatric [Dizziness] : no dizziness [FreeTextEntry5] : chest tightness

## 2024-02-20 NOTE — PLAN
[FreeTextEntry1] : 37 yo male with PMHx of numular eczema, acne, Anxiety, recurrent GERD, H. pylori gastritis s/p eradication, fatty liver, DLD, CTA with cardio (negative for stenosis), in office today for routine follow up. He is complaining of cough, rhinorrhea, lethargy, and headache for 3 months.  #r/o bacterial PNA #cough # chest tightness -most likely due to an unresolved URI after failed Augmentin trail  completed augmentin, tessilon perles. ongoing dyspnea on exertion and cough productive c yellow/green sputum, great deal of second hand smoking exposure, left sided rhonchi and pharyngeal erythema on exam. viral panels in er negative x 2 empirically tx c azithromycin r/o atypical pneumonia -non-contrast CT scan ordered, cxr from er negative x 2, but exam notable for diffuse L rhonchi today -prescribe rescue inhaler to use as needed  counselled patient on how/when to take medication and about side effects - CTA was negative   # Headaches - most-likely related to the unresolved URI   # fatty liver # GERD - not on any medication -pt recommended to follow a well-balance diet   #Anxiety - not on any medication - resolved with anxiety relieving techniques  -Patient will follow up with specialist if worsening symptoms.  Follow up with health care provider within 2 weeks  # DLD - recommend life-style modification for now Hyperlipidemia; Low fat, low cholesterol diet. Discussed importance of eating a heart healthy diet Counseled on aerobic exercise and weight loss Fasting lipid panel every 3 months Treatment options and possible side effects discussed   RTC in 2 weeks

## 2024-02-20 NOTE — END OF VISIT
[] : A student assisted with documenting this visit. I have reviewed and verified all information documented by the student, and made modifications to such information, when appropriate. [FreeTextEntry3] : I saw the patient, examined the patient independently, reviewed medical record, and provided the medical services. Agree with resident note as personally edited above. azithromycin course c food. counselled patient on how/when to take medication and about side effects. imaging of lungs. rescue inhaler trial prn. rtc in 2 weeks if symptoms persist. ct lung, will consider PFTs. does not appear toxic/septic at this time we reviewed prior bloodwork and discussed low fat low carb diet as well

## 2024-02-20 NOTE — HISTORY OF PRESENT ILLNESS
[FreeTextEntry1] : sick visit- cough x 3 mo [de-identified] : 39 yo male with PMHx of numular eczema, acne, Anxiety, recurrent GERD, H. pylori gastritis s/p eradication, fatty liver, DLD, CTA with cardio (negative for stenosis), in office today for routine follow up. He is complaining of cough, rhinorrhea, lethargy, and headache for 3 months. Pt reports that he has been last seen in the ER for these symptoms 4 weeks ago and was d/c on Augmentin and Benzonatate without relief. Pt reports that he has green phlegm with the cough and states that the cough is worse in the cold weather and with mild exertion; he also mentions some chest tightness accompanying the cough. Pt reports that he is not a smoker, however, he has people constantly smoking cigarettes around him at work as a . Denies N/V/D, hemoptysis. back pain, dizziness, weakness, changes in appetite. Pt mentions intermittent fever and chillsm though he reports these were last noted 2 weeks ago and temperature was not recorded. Negative viral testing.  1 33

## 2024-02-22 LAB
A1AT PHENOTYP SERPL-IMP: NORMAL
A1AT SERPL-MCNC: 117 MG/DL

## 2024-03-01 DIAGNOSIS — R07.89 OTHER CHEST PAIN: ICD-10-CM

## 2024-03-01 DIAGNOSIS — R05.2 SUBACUTE COUGH: ICD-10-CM

## 2024-03-01 DIAGNOSIS — R51.9 HEADACHE, UNSPECIFIED: ICD-10-CM

## 2024-03-12 ENCOUNTER — OUTPATIENT (OUTPATIENT)
Dept: OUTPATIENT SERVICES | Facility: HOSPITAL | Age: 39
LOS: 1 days | End: 2024-03-12
Payer: COMMERCIAL

## 2024-03-12 ENCOUNTER — APPOINTMENT (OUTPATIENT)
Dept: INTERNAL MEDICINE | Facility: CLINIC | Age: 39
End: 2024-03-12
Payer: COMMERCIAL

## 2024-03-12 VITALS
SYSTOLIC BLOOD PRESSURE: 108 MMHG | OXYGEN SATURATION: 98 % | DIASTOLIC BLOOD PRESSURE: 69 MMHG | WEIGHT: 160 LBS | HEART RATE: 71 BPM | BODY MASS INDEX: 29.44 KG/M2 | HEIGHT: 62 IN

## 2024-03-12 DIAGNOSIS — R05.8 OTHER SPECIFIED COUGH: ICD-10-CM

## 2024-03-12 DIAGNOSIS — Z00.00 ENCOUNTER FOR GENERAL ADULT MEDICAL EXAMINATION W/OUT ABNORMAL FINDINGS: ICD-10-CM

## 2024-03-12 DIAGNOSIS — H53.8 OTHER VISUAL DISTURBANCES: ICD-10-CM

## 2024-03-12 DIAGNOSIS — R05.2 SUBACUTE COUGH: ICD-10-CM

## 2024-03-12 DIAGNOSIS — Z00.00 ENCOUNTER FOR GENERAL ADULT MEDICAL EXAMINATION WITHOUT ABNORMAL FINDINGS: ICD-10-CM

## 2024-03-12 PROCEDURE — 99214 OFFICE O/P EST MOD 30 MIN: CPT

## 2024-03-12 PROCEDURE — G2211 COMPLEX E/M VISIT ADD ON: CPT

## 2024-03-12 NOTE — HISTORY OF PRESENT ILLNESS
[FreeTextEntry1] : follow up [de-identified] : 37 yo male with PMHx of numular eczema, acne, Anxiety, recurrent GERD, H. pylori gastritis s/p eradication, fatty liver, DLD, CTA with cardio (negative for stenosis), in office today for follow up for suspected atypical pneumonia. He was seen in clinic 2 weeks ago for a 3 month HO cough w/ green sputum, rhinorrhea, and lethargy. Exam notable for diffuse L rhonchi. Viral testing was negative. S/p Augmentin and benzonatate with no relief. He was treated empirically with azithromycin and was prescribed rescue albuterol.  Today he reports symptomatic improvement. The cough has resolved but he still has some residual chest tightness and uses his albuterol once or twice a day.

## 2024-03-12 NOTE — ASSESSMENT
[FreeTextEntry1] : #r/o bacterial PNA - RESOLVED #cough - RESOLVED #chest tightness - RESOLVED -most likely due to an unresolved URI after failed Augmentin trail completed augmentin, tessilon perles. ongoing dyspnea on exertion and cough productive c yellow/green sputum, great deal of second hand smoking exposure, left sided rhonchi and pharyngeal erythema on exam. viral panels in -er negative x 2 -viral panel in ED negative -CTA in ED negative -empirically tx c azithromycin r/o atypical pneumonia -non-contrast CT scan ordered but not done, cxr from er negative x 2 -prescribe rescue inhaler to use as needed -counselled patient on how/when to take medication and about side effects  # fatty liver #GERD -not on any medication -pt recommended to follow a well-balance diet -follows with Dr. Carvajal, due for fibroscan  #Blurry Vision -referred to ophthalmology -counselled on protective eyewear at work () and avoidance of allergens  #Anxiety -not on any medication -resolved with anxiety relieving techniques -patient will follow up with specialist if worsening symptoms.  #DLD -recommend life-style modification for now Hyperlipidemia; Low fat, low cholesterol diet. Discussed importance of eating a heart healthy diet Counseled on aerobic exercise and weight loss Fasting lipid panel every 3 months Treatment options and possible side effects discussed

## 2024-03-12 NOTE — PHYSICAL EXAM
[de-identified] : mild pharyngeal erythema [de-identified] : conjunctival injection bilaterally

## 2024-03-12 NOTE — END OF VISIT
[] : Resident [FreeTextEntry3] : I saw the patient, examined the patient independently, reviewed medical record, and provided the medical services. Agree with resident note as personally edited above. L base CTA today, symptoms much improved supportive care c prn rescue inhaler still a great deal of exposure to environmental allergens at work as  ophthal eval regarding vision

## 2024-03-12 NOTE — REVIEW OF SYSTEMS
[Fever] : no fever [Hearing Loss] : no hearing loss [Vision Problems] : no vision problems [Chest Pain] : no chest pain [Wheezing] : no wheezing [Orthopena] : no orthopnea [Abdominal Pain] : no abdominal pain [Dyspnea on Exertion] : not dyspnea on exertion [Insomnia] : no insomnia [Dysuria] : no dysuria

## 2024-03-15 ENCOUNTER — RESULT REVIEW (OUTPATIENT)
Age: 39
End: 2024-03-15

## 2024-03-15 ENCOUNTER — OUTPATIENT (OUTPATIENT)
Dept: OUTPATIENT SERVICES | Facility: HOSPITAL | Age: 39
LOS: 1 days | End: 2024-03-15
Payer: COMMERCIAL

## 2024-03-15 DIAGNOSIS — Z00.8 ENCOUNTER FOR OTHER GENERAL EXAMINATION: ICD-10-CM

## 2024-03-15 DIAGNOSIS — K76.0 FATTY (CHANGE OF) LIVER, NOT ELSEWHERE CLASSIFIED: ICD-10-CM

## 2024-03-15 PROCEDURE — 74183 MRI ABD W/O CNTR FLWD CNTR: CPT

## 2024-03-15 PROCEDURE — 74183 MRI ABD W/O CNTR FLWD CNTR: CPT | Mod: 26

## 2024-03-15 PROCEDURE — A9579: CPT

## 2024-03-16 DIAGNOSIS — K76.0 FATTY (CHANGE OF) LIVER, NOT ELSEWHERE CLASSIFIED: ICD-10-CM

## 2024-03-22 DIAGNOSIS — R05.2 SUBACUTE COUGH: ICD-10-CM

## 2024-03-22 DIAGNOSIS — H53.8 OTHER VISUAL DISTURBANCES: ICD-10-CM

## 2024-03-22 DIAGNOSIS — K76.0 FATTY (CHANGE OF) LIVER, NOT ELSEWHERE CLASSIFIED: ICD-10-CM

## 2024-03-22 DIAGNOSIS — R05.8 OTHER SPECIFIED COUGH: ICD-10-CM

## 2024-03-27 ENCOUNTER — OUTPATIENT (OUTPATIENT)
Dept: OUTPATIENT SERVICES | Facility: HOSPITAL | Age: 39
LOS: 1 days | End: 2024-03-27
Payer: COMMERCIAL

## 2024-03-27 ENCOUNTER — APPOINTMENT (OUTPATIENT)
Dept: GASTROENTEROLOGY | Facility: CLINIC | Age: 39
End: 2024-03-27
Payer: COMMERCIAL

## 2024-03-27 VITALS
WEIGHT: 163 LBS | SYSTOLIC BLOOD PRESSURE: 143 MMHG | BODY MASS INDEX: 30 KG/M2 | DIASTOLIC BLOOD PRESSURE: 74 MMHG | TEMPERATURE: 97.2 F | HEART RATE: 69 BPM | HEIGHT: 62 IN

## 2024-03-27 VITALS
SYSTOLIC BLOOD PRESSURE: 91 MMHG | BODY MASS INDEX: 24.66 KG/M2 | TEMPERATURE: 97.3 F | HEIGHT: 62 IN | WEIGHT: 134 LBS | OXYGEN SATURATION: 98 % | DIASTOLIC BLOOD PRESSURE: 64 MMHG | HEART RATE: 80 BPM

## 2024-03-27 DIAGNOSIS — Z00.00 ENCOUNTER FOR GENERAL ADULT MEDICAL EXAMINATION WITHOUT ABNORMAL FINDINGS: ICD-10-CM

## 2024-03-27 DIAGNOSIS — K21.9 GASTRO-ESOPHAGEAL REFLUX DISEASE W/OUT ESOPHAGITIS: ICD-10-CM

## 2024-03-27 DIAGNOSIS — K76.0 FATTY (CHANGE OF) LIVER, NOT ELSEWHERE CLASSIFIED: ICD-10-CM

## 2024-03-27 DIAGNOSIS — K59.00 CONSTIPATION, UNSPECIFIED: ICD-10-CM

## 2024-03-27 DIAGNOSIS — K57.30 DIVERTICULOSIS OF LARGE INTESTINE W/OUT PERFORATION OR ABSCESS W/OUT BLEEDING: ICD-10-CM

## 2024-03-27 PROCEDURE — 99204 OFFICE O/P NEW MOD 45 MIN: CPT

## 2024-03-27 RX ORDER — SENNOSIDES 8.6 MG TABLETS 8.6 MG/1
8.6 TABLET ORAL
Qty: 180 | Refills: 3 | Status: ACTIVE | COMMUNITY
Start: 2024-03-27 | End: 1900-01-01

## 2024-03-27 NOTE — PHYSICAL EXAM
[Alert] : alert [Normal Voice/Communication] : normal voice/communication [Sclera] : the sclera and conjunctiva were normal [Normal Appearance] : the appearance of the neck was normal [No Respiratory Distress] : no respiratory distress [Heart Rate And Rhythm] : heart rate was normal and rhythm regular [Bowel Sounds] : normal bowel sounds [Abdomen Tenderness] : non-tender [No Masses] : no abdominal mass palpated [Ascites: ___] : no ascites [Rebound Tenderness] : no rebound tenderness

## 2024-03-27 NOTE — HISTORY OF PRESENT ILLNESS
[de-identified] : Impressions: Normal mucosa in the whole esophagus. Gastroesophageal junction was noted at 40 cm from the incisors. Bravo capsule was deployed at 34 cm from the incisors. Successful deployment in the appropriate position was confirmed endoscopically. .    Erosions in the stomach compatible with erosive gastritis.    Normal mucosa in the whole examined duodenum. [FreeTextEntry1] : March 15 2024  There is mild hepatic steatosis. No suspicious enhancing mass is seen.

## 2024-03-27 NOTE — ASSESSMENT
[FreeTextEntry1] : 39yo M w/a PMH of H Pylori gastritis s/p eradication 2019, GERD, fatty liver, nummular eczema, acne, Anxiety, DLD is here for follow up.   #Constipation  - Straining more then usual   - Advised on high fiber diet  - Start Senna / miralax taper up  #GERD  - Had EGD done 2023 w/ bravo showed acid exposure time .4%  - Off of PPI  - Currently asx, no red flags  - C/w life style modification   #Hepatic Steatosis  #Fatty liver  - Follows w/ Dr. Carvajal  - Advised on life style modification  - MR liver negative for any mass, showed hepatic steatosis   #Tubular adenoma  - Repeat colonoscopy in 2025 - no FH of GI malignancy   #Colon, diverticulosis  - High fiber diet advised - Avoid constipation - Start Senna

## 2024-03-27 NOTE — REVIEW OF SYSTEMS
[Constipation] : constipation [Fever] : no fever [Chills] : no chills [Recent Weight Gain (___ Lbs)] : no recent weight gain [Recent Weight Loss (___ Lbs)] : no recent weight loss [Shortness Of Breath] : no shortness of breath [Abdominal Pain] : no abdominal pain [Vomiting] : no vomiting [Diarrhea] : no diarrhea [Heartburn] : no heartburn [Melena (black stool)] : no melena

## 2024-04-02 DIAGNOSIS — K57.30 DIVERTICULOSIS OF LARGE INTESTINE WITHOUT PERFORATION OR ABSCESS WITHOUT BLEEDING: ICD-10-CM

## 2024-04-02 DIAGNOSIS — K59.00 CONSTIPATION, UNSPECIFIED: ICD-10-CM

## 2024-04-02 DIAGNOSIS — K21.9 GASTRO-ESOPHAGEAL REFLUX DISEASE WITHOUT ESOPHAGITIS: ICD-10-CM

## 2024-04-02 DIAGNOSIS — K76.0 FATTY (CHANGE OF) LIVER, NOT ELSEWHERE CLASSIFIED: ICD-10-CM

## 2024-04-11 ENCOUNTER — EMERGENCY (EMERGENCY)
Facility: HOSPITAL | Age: 39
LOS: 0 days | Discharge: HOME | End: 2024-04-11
Attending: EMERGENCY MEDICINE
Payer: MEDICAID

## 2024-04-11 VITALS
TEMPERATURE: 99 F | OXYGEN SATURATION: 98 % | DIASTOLIC BLOOD PRESSURE: 79 MMHG | SYSTOLIC BLOOD PRESSURE: 114 MMHG | HEART RATE: 75 BPM | RESPIRATION RATE: 17 BRPM

## 2024-04-11 DIAGNOSIS — M54.6 PAIN IN THORACIC SPINE: ICD-10-CM

## 2024-04-11 PROCEDURE — 99283 EMERGENCY DEPT VISIT LOW MDM: CPT | Mod: 25

## 2024-04-11 PROCEDURE — 71046 X-RAY EXAM CHEST 2 VIEWS: CPT

## 2024-04-11 PROCEDURE — 71046 X-RAY EXAM CHEST 2 VIEWS: CPT | Mod: 26

## 2024-04-11 PROCEDURE — 99284 EMERGENCY DEPT VISIT MOD MDM: CPT

## 2024-04-11 RX ORDER — METHOCARBAMOL 500 MG/1
2 TABLET, FILM COATED ORAL
Qty: 42 | Refills: 0
Start: 2024-04-11 | End: 2024-04-17

## 2024-04-11 RX ORDER — IBUPROFEN 200 MG
600 TABLET ORAL ONCE
Refills: 0 | Status: COMPLETED | OUTPATIENT
Start: 2024-04-11 | End: 2024-04-11

## 2024-04-11 RX ORDER — IBUPROFEN 200 MG
1 TABLET ORAL
Qty: 28 | Refills: 0
Start: 2024-04-11 | End: 2024-04-17

## 2024-04-11 RX ADMIN — Medication 600 MILLIGRAM(S): at 16:32

## 2024-04-11 NOTE — ED PROVIDER NOTE - OBJECTIVE STATEMENT
38-year-old male with no past medical history who presents to the ED with mid upper back pain. Reports that symptoms started 4 days ago; pain is constant, and worse with movement.  Denies recent trauma injury to his back, saddle anesthesia, and loss of bladder/bowel control.  Denies fever, shortness of breath, chest pain, nausea, vomiting, abdominal pain, urinary symptoms, and change with bowel movement.

## 2024-04-11 NOTE — ED PROVIDER NOTE - PATIENT PORTAL LINK FT
You can access the FollowMyHealth Patient Portal offered by [facility] by registering at the following website: [email or web address]. By joining [facility]’s FollowMyHealth portal, you will also be able to view your health information using other applications (apps) compatible with our system.

## 2024-04-11 NOTE — ED PROVIDER NOTE - CLINICAL SUMMARY MEDICAL DECISION MAKING FREE TEXT BOX
38-year-old male no significant past medical history presenting for evaluation of upper back pain.  Atraumatic.  Worse with movement and deep inspiration.  No other acute complaints.  Well appearing, NAD, non toxic. NCAT PERRLA EOMI neck supple non tender normal wob cta bl rrr abdomen s nt nd no rebound no guarding WWPx4 neuro non focal.  No midline CTL spine tenderness palpation throughout.  No gross deformities noted to skin.  Tenderness palpation bilateral paraspinal thoracic.  Imaging reviewed.  Comfortable with discharge and follow-up outpatient, strict return precautions given. Endorses understanding of all of this and aware that they can return at any time for new or concerning symptoms. No further questions or concerns at this time

## 2024-04-11 NOTE — ED PROVIDER NOTE - PHYSICAL EXAMINATION
CONSTITUTIONAL: Well-appearing; in no apparent distress.   HEAD: Normocephalic; atraumatic.   RESPIRATORY: No signs of respiratory distress. Lung sounds are clear in all lobes bilaterally without rales, rhonchi, or wheezes.  CARDIOVASCULAR: Regular rate and rhythm.   GI: Abdomen is soft, non-tender, and without distention. Bowel sounds are present and normoactive in all four quadrants. No masses are noted.   BACK: No evidence of trauma or deformity. No midline tenderness. Tender to palpation in B/L paraspinous muscle area in the thoracic spine. Normal ROM.   NEURO: A & O x 3. Normal speech. No focal deficit.  PSYCHOLOGICAL: Appropriate mood and affect. Good judgement and insight.

## 2024-05-01 NOTE — ASU PATIENT PROFILE, ADULT - TEACHING/LEARNING LEARNING PREFERENCES
Previously Declined (within the last year)
individual instruction/verbal instruction/written material

## 2024-05-13 ENCOUNTER — APPOINTMENT (OUTPATIENT)
Age: 39
End: 2024-05-13

## 2024-05-16 ENCOUNTER — APPOINTMENT (OUTPATIENT)
Dept: GASTROENTEROLOGY | Facility: CLINIC | Age: 39
End: 2024-05-16

## 2024-07-09 ENCOUNTER — OUTPATIENT (OUTPATIENT)
Dept: OUTPATIENT SERVICES | Facility: HOSPITAL | Age: 39
LOS: 1 days | End: 2024-07-09
Payer: COMMERCIAL

## 2024-07-09 ENCOUNTER — APPOINTMENT (OUTPATIENT)
Dept: PULMONOLOGY | Facility: CLINIC | Age: 39
End: 2024-07-09

## 2024-07-09 ENCOUNTER — APPOINTMENT (OUTPATIENT)
Dept: CARDIOLOGY | Facility: CLINIC | Age: 39
End: 2024-07-09
Payer: COMMERCIAL

## 2024-07-09 VITALS
SYSTOLIC BLOOD PRESSURE: 118 MMHG | DIASTOLIC BLOOD PRESSURE: 76 MMHG | HEIGHT: 62 IN | TEMPERATURE: 97.4 F | WEIGHT: 161 LBS | OXYGEN SATURATION: 98 % | BODY MASS INDEX: 29.63 KG/M2 | HEART RATE: 74 BPM

## 2024-07-09 DIAGNOSIS — K76.0 FATTY (CHANGE OF) LIVER, NOT ELSEWHERE CLASSIFIED: ICD-10-CM

## 2024-07-09 DIAGNOSIS — E78.5 HYPERLIPIDEMIA, UNSPECIFIED: ICD-10-CM

## 2024-07-09 DIAGNOSIS — K21.9 GASTRO-ESOPHAGEAL REFLUX DISEASE W/OUT ESOPHAGITIS: ICD-10-CM

## 2024-07-09 DIAGNOSIS — R07.89 OTHER CHEST PAIN: ICD-10-CM

## 2024-07-09 DIAGNOSIS — Z00.00 ENCOUNTER FOR GENERAL ADULT MEDICAL EXAMINATION WITHOUT ABNORMAL FINDINGS: ICD-10-CM

## 2024-07-09 PROCEDURE — 99214 OFFICE O/P EST MOD 30 MIN: CPT

## 2024-07-09 RX ORDER — KRILL/OM-3/DHA/EPA/PHOSPHO/AST 1000-230MG
81 CAPSULE ORAL
Qty: 30 | Refills: 3 | Status: ACTIVE | COMMUNITY
Start: 2024-07-09 | End: 1900-01-01

## 2024-07-09 RX ORDER — ATORVASTATIN CALCIUM 40 MG/1
40 TABLET, FILM COATED ORAL
Qty: 30 | Refills: 3 | Status: ACTIVE | COMMUNITY
Start: 2024-07-09 | End: 1900-01-01

## 2024-07-18 DIAGNOSIS — K21.9 GASTRO-ESOPHAGEAL REFLUX DISEASE WITHOUT ESOPHAGITIS: ICD-10-CM

## 2024-07-18 DIAGNOSIS — E78.5 HYPERLIPIDEMIA, UNSPECIFIED: ICD-10-CM

## 2024-07-18 DIAGNOSIS — K76.0 FATTY (CHANGE OF) LIVER, NOT ELSEWHERE CLASSIFIED: ICD-10-CM

## 2024-07-18 DIAGNOSIS — R07.89 OTHER CHEST PAIN: ICD-10-CM

## 2024-08-07 ENCOUNTER — OUTPATIENT (OUTPATIENT)
Dept: OUTPATIENT SERVICES | Facility: HOSPITAL | Age: 39
LOS: 1 days | End: 2024-08-07
Payer: COMMERCIAL

## 2024-08-07 ENCOUNTER — NON-APPOINTMENT (OUTPATIENT)
Age: 39
End: 2024-08-07

## 2024-08-07 ENCOUNTER — APPOINTMENT (OUTPATIENT)
Dept: GASTROENTEROLOGY | Facility: CLINIC | Age: 39
End: 2024-08-07

## 2024-08-07 DIAGNOSIS — Z00.00 ENCOUNTER FOR GENERAL ADULT MEDICAL EXAMINATION WITHOUT ABNORMAL FINDINGS: ICD-10-CM

## 2024-08-07 PROCEDURE — 99204 OFFICE O/P NEW MOD 45 MIN: CPT

## 2024-08-07 PROCEDURE — 99214 OFFICE O/P EST MOD 30 MIN: CPT

## 2024-08-07 NOTE — HISTORY OF PRESENT ILLNESS
[FreeTextEntry1] : 39yo M w/a PMH of H Pylori gastritis s/p eradication 2019, GERD, fatty liver, nummular eczema, acne, Anxiety, DLD is here for follow up.  GI history:  In Feb 2024 he was referred to hepatology by cardio for a CTA which showed a 8mm lesion. MRI liver with IV contrast was done and showed mild hepatic steatosis. CMP in Feb showed LFTs wnl. (AST 21, ALT 33, Bili .4) A1C 5.7, .  Ceruloplasmin, Acute hepatitis panel, Smooth muscle ab, SATHISH, AA1T, Immunoglobulin negative (Only kappa FLC slightly elevated at 2) Follows W/ Dr. Rojas EGD 02/22/23: Normal mucosa in the whole esophagus. Gastroesophageal junction was noted at 40 cm from the incisors. Bravo capsule was deployed at 34 cm from the incisors. Successful deployment in the appropriate position was confirmed endoscopically.  Patient was last seen 04/27/24. Gregory showed an acid exposure time od 0.4%. He is now off PPIs.  Is due for a repeat colonoscopy in 2025 ([place] 08/03/2020 with one 5mm polyp in descending colon found to be tubular adenoma). Today patient endorses feeling well. Constipation has improved, patient states he has been consuming more fruits and vegetables and drinking more water. Patient is currently not taking any medicationos.

## 2024-08-07 NOTE — ASSESSMENT
[FreeTextEntry1] : 40yo M w/a PMH of H Pylori gastritis s/p eradication 2019, GERD, fatty liver, nummular eczema, acne, Anxiety, DLD is here for follow up.  #GERD-resolved - Had EGD done 2023 w/ bravo showed acid exposure time.4% - Off of PPI - Currently asx, no red flags - C/w life style modification (fruits, vegetables, inc water intake, not lying down immediately following meals)  #Hepatic Steatosis #Fatty liver - Follows w/ Dr. Carvajal - Advised on lifestyle modifications - MR liver negative for any mass, showed hepatic steatosis  #Tubular adenoma - had colo in 2020, indication was r/o IBD - found with 5mm polyp, tubular adenoma - Repeat colonoscopy due in 2025 - no FH of GI malignancy - will schedule at this appointment: will send orders  Follow up 4 months

## 2024-08-07 NOTE — PHYSICAL EXAM
[Alert] : alert [Sclera] : the sclera and conjunctiva were normal [Normal Voice/Communication] : normal voice/communication [Hearing Threshold Finger Rub Not [place]] : hearing was normal [Normal Lips/Gums] : the lips and gums were normal [Normal Appearance] : the appearance of the neck was normal [No Neck Mass] : no neck mass was observed [No Respiratory Distress] : no respiratory distress [No Acc Muscle Use] : no accessory muscle use [Respiration, Rhythm And Depth] : normal respiratory rhythm and effort [Heart Rate And Rhythm] : heart rate was normal and rhythm regular [Normal S1, S2] : normal S1 and S2 [Murmurs] : no murmurs [Bowel Sounds] : normal bowel sounds [Abdomen Tenderness] : non-tender [No Masses] : no abdominal mass palpated [Abdomen Soft] : soft [] : no hepatosplenomegaly [No CVA Tenderness] : no CVA  tenderness [No Joint Swelling] : no joint swelling seen [Normal Color / Pigmentation] : normal skin color and pigmentation [Oriented To Time, Place, And Person] : oriented to person, place, and time [No Acute Distress] : no acute distress

## 2024-08-07 NOTE — REVIEW OF SYSTEMS
[Fever] : no fever [Chills] : no chills [Feeling Poorly] : not feeling poorly [Eye Pain] : no eye pain [Red Eyes] : eyes not red [Loss Of Hearing] : no hearing loss [Sore Throat] : no sore throat [Heart Rate Is Slow] : the heart rate was not slow [Heart Rate Is Fast] : the heart rate was not fast [Chest Pain] : no chest pain [Shortness Of Breath] : no shortness of breath [Cough] : no cough [SOB on Exertion] : no shortness of breath during exertion [Abdominal Pain] : no abdominal pain [Vomiting] : no vomiting [Constipation] : no constipation [Diarrhea] : no diarrhea [Bleeding] : no bleeding [Pelvic Pain] : no pelvic pain [Joint Swelling] : no joint swelling [Joint Stiffness] : no joint stiffness [Skin Wound] : no skin wound [Confused] : no confusion [Convulsions] : no convulsions [Depression] : no depression [Muscle Weakness] : no muscle weakness [Easy Bruising] : no tendency for easy bruising

## 2024-08-20 DIAGNOSIS — K62.9 DISEASE OF ANUS AND RECTUM, UNSPECIFIED: ICD-10-CM

## 2024-08-20 DIAGNOSIS — K59.00 CONSTIPATION, UNSPECIFIED: ICD-10-CM

## 2024-08-20 DIAGNOSIS — Z12.11 ENCOUNTER FOR SCREENING FOR MALIGNANT NEOPLASM OF COLON: ICD-10-CM

## 2024-08-20 DIAGNOSIS — K57.30 DIVERTICULOSIS OF LARGE INTESTINE WITHOUT PERFORATION OR ABSCESS WITHOUT BLEEDING: ICD-10-CM

## 2024-08-23 ENCOUNTER — RESULT REVIEW (OUTPATIENT)
Age: 39
End: 2024-08-23

## 2024-08-23 ENCOUNTER — APPOINTMENT (OUTPATIENT)
Dept: CV DIAGNOSITCS | Facility: HOSPITAL | Age: 39
End: 2024-08-23
Payer: COMMERCIAL

## 2024-08-23 ENCOUNTER — OUTPATIENT (OUTPATIENT)
Dept: OUTPATIENT SERVICES | Facility: HOSPITAL | Age: 39
LOS: 1 days | End: 2024-08-23
Payer: COMMERCIAL

## 2024-08-23 ENCOUNTER — APPOINTMENT (OUTPATIENT)
Dept: CV DIAGNOSTICS | Facility: HOSPITAL | Age: 39
End: 2024-08-23
Payer: COMMERCIAL

## 2024-08-23 DIAGNOSIS — R07.9 CHEST PAIN, UNSPECIFIED: ICD-10-CM

## 2024-08-23 PROCEDURE — 93306 TTE W/DOPPLER COMPLETE: CPT | Mod: 26

## 2024-08-23 PROCEDURE — 93017 CV STRESS TEST TRACING ONLY: CPT

## 2024-08-23 PROCEDURE — 78452 HT MUSCLE IMAGE SPECT MULT: CPT | Mod: 26,MC

## 2024-08-23 PROCEDURE — 93018 CV STRESS TEST I&R ONLY: CPT

## 2024-08-23 PROCEDURE — 93016 CV STRESS TEST SUPVJ ONLY: CPT

## 2024-08-23 PROCEDURE — 78452 HT MUSCLE IMAGE SPECT MULT: CPT | Mod: MC

## 2024-08-23 PROCEDURE — 93306 TTE W/DOPPLER COMPLETE: CPT

## 2024-08-23 PROCEDURE — A9500: CPT

## 2024-08-24 DIAGNOSIS — R07.9 CHEST PAIN, UNSPECIFIED: ICD-10-CM

## 2024-09-17 ENCOUNTER — APPOINTMENT (OUTPATIENT)
Dept: INTERNAL MEDICINE | Facility: CLINIC | Age: 39
End: 2024-09-17
Payer: COMMERCIAL

## 2024-09-17 ENCOUNTER — OUTPATIENT (OUTPATIENT)
Dept: OUTPATIENT SERVICES | Facility: HOSPITAL | Age: 39
LOS: 1 days | End: 2024-09-17
Payer: COMMERCIAL

## 2024-09-17 VITALS
TEMPERATURE: 98.1 F | WEIGHT: 160 LBS | BODY MASS INDEX: 29.44 KG/M2 | DIASTOLIC BLOOD PRESSURE: 78 MMHG | SYSTOLIC BLOOD PRESSURE: 122 MMHG | OXYGEN SATURATION: 100 % | HEART RATE: 71 BPM | HEIGHT: 62 IN

## 2024-09-17 DIAGNOSIS — Z00.00 ENCOUNTER FOR GENERAL ADULT MEDICAL EXAMINATION W/OUT ABNORMAL FINDINGS: ICD-10-CM

## 2024-09-17 DIAGNOSIS — E78.5 HYPERLIPIDEMIA, UNSPECIFIED: ICD-10-CM

## 2024-09-17 DIAGNOSIS — N48.1 BALANITIS: ICD-10-CM

## 2024-09-17 DIAGNOSIS — Z00.00 ENCOUNTER FOR GENERAL ADULT MEDICAL EXAMINATION WITHOUT ABNORMAL FINDINGS: ICD-10-CM

## 2024-09-17 PROCEDURE — 99214 OFFICE O/P EST MOD 30 MIN: CPT

## 2024-09-17 NOTE — ASSESSMENT
[FreeTextEntry1] : 37 yo male with PMHx of nummular eczema, recurrent GERD, H. pylori gastritis s/p eradication, fatty liver, DLD, presenting for routine follow up.   # fatty liver # GERD -not on any medication -pt recommended to follow a well-balance diet  #Balanitis -patient reported having white rash on his penis intermittently -no urinary symptoms, no dysuria, no hematuria, no frequency, no pyuria  -instructed to wash and clean the area thoroughly with water/soap, retract foreskin, dab area dry -Clotrimazole cream prescribed  counselled patient on how/when to take medication and about side effects  # DLD - recommend life-style modification for now Hyperlipidemia; Low fat, low cholesterol diet. Discussed importance of eating a heart healthy diet Counseled on aerobic exercise and weight loss Fasting lipid panel every 6 months  Routine labs and follow up in 6 months

## 2024-09-17 NOTE — END OF VISIT
[] : Resident [FreeTextEntry3] : I saw the patient, examined the patient independently, reviewed medical record, and provided the medical services. Agree with resident note as personally edited above. reports recurrent balanitis, not currently present but coming and going counselling rendered  repeat labs due, f/u a1c repeat- last was climbing in predm2 range stop topical steroid topical antifungal instead if recurs

## 2024-09-17 NOTE — REVIEW OF SYSTEMS
[Fatigue] : fatigue [Fever] : no fever [Chills] : no chills [Night Sweats] : no night sweats [Chest Pain] : no chest pain [Palpitations] : no palpitations [Claudication] : no  leg claudication [Lower Ext Edema] : no lower extremity edema [Orthopena] : no orthopnea [Shortness Of Breath] : no shortness of breath [Wheezing] : no wheezing [Cough] : no cough [Dyspnea on Exertion] : not dyspnea on exertion [Abdominal Pain] : no abdominal pain [Nausea] : no nausea [Vomiting] : no vomiting [Heartburn] : no heartburn [Melena] : no melena [Dysuria] : no dysuria [Incontinence] : no incontinence [Nocturia] : no nocturia [Hematuria] : no hematuria [Frequency] : no frequency [Joint Pain] : no joint pain [Joint Stiffness] : no joint stiffness

## 2024-09-17 NOTE — HISTORY OF PRESENT ILLNESS
[FreeTextEntry1] : follow up  [de-identified] : 40 yo male with PMHx of nummular eczema, Anxiety, recurrent GERD, H. pylori gastritis s/p eradication, fatty liver, DLD presenting for routine follow up.  no major complaints, reporting generalized weakness and muscular pain. Had cardiac stress test done last months --> was negative.

## 2024-09-17 NOTE — PHYSICAL EXAM
[No Acute Distress] : no acute distress [Well Nourished] : well nourished [Well Developed] : well developed [No Respiratory Distress] : no respiratory distress  [No Accessory Muscle Use] : no accessory muscle use [Clear to Auscultation] : lungs were clear to auscultation bilaterally [Normal Rate] : normal rate  [Regular Rhythm] : with a regular rhythm [Normal S1, S2] : normal S1 and S2 [Soft] : abdomen soft [Non Tender] : non-tender [Non-distended] : non-distended [No Masses] : no abdominal mass palpated [No HSM] : no HSM [No Joint Swelling] : no joint swelling [Grossly Normal Strength/Tone] : grossly normal strength/tone

## 2024-09-22 RX ORDER — CLOTRIMAZOLE 10 MG/G
1 CREAM TOPICAL TWICE DAILY
Qty: 1 | Refills: 2 | Status: ACTIVE | COMMUNITY
Start: 2024-09-17 | End: 1900-01-01

## 2024-09-27 DIAGNOSIS — E78.5 HYPERLIPIDEMIA, UNSPECIFIED: ICD-10-CM

## 2024-09-27 DIAGNOSIS — Z00.00 ENCOUNTER FOR GENERAL ADULT MEDICAL EXAMINATION WITHOUT ABNORMAL FINDINGS: ICD-10-CM

## 2024-09-27 DIAGNOSIS — N48.1 BALANITIS: ICD-10-CM

## 2024-10-08 NOTE — ED ADULT NURSE NOTE - CHIEF COMPLAINT QUOTE
Pt c/o of chest tightness and L upper back pain. Pt sent from [facility], COVID PCR negative.
08-Oct-2024 08:30

## 2024-10-22 ENCOUNTER — OUTPATIENT (OUTPATIENT)
Dept: OUTPATIENT SERVICES | Facility: HOSPITAL | Age: 39
LOS: 1 days | End: 2024-10-22
Payer: COMMERCIAL

## 2024-10-22 ENCOUNTER — APPOINTMENT (OUTPATIENT)
Dept: CARDIOLOGY | Facility: CLINIC | Age: 39
End: 2024-10-22
Payer: COMMERCIAL

## 2024-10-22 VITALS
TEMPERATURE: 97.7 F | DIASTOLIC BLOOD PRESSURE: 69 MMHG | BODY MASS INDEX: 29.44 KG/M2 | OXYGEN SATURATION: 99 % | HEART RATE: 62 BPM | WEIGHT: 160 LBS | HEIGHT: 62 IN | SYSTOLIC BLOOD PRESSURE: 113 MMHG

## 2024-10-22 DIAGNOSIS — Z00.00 ENCOUNTER FOR GENERAL ADULT MEDICAL EXAMINATION WITHOUT ABNORMAL FINDINGS: ICD-10-CM

## 2024-10-22 DIAGNOSIS — E78.5 HYPERLIPIDEMIA, UNSPECIFIED: ICD-10-CM

## 2024-10-22 DIAGNOSIS — R07.89 OTHER CHEST PAIN: ICD-10-CM

## 2024-10-22 PROCEDURE — 99214 OFFICE O/P EST MOD 30 MIN: CPT

## 2024-10-22 PROCEDURE — 99204 OFFICE O/P NEW MOD 45 MIN: CPT

## 2024-10-24 DIAGNOSIS — R07.89 OTHER CHEST PAIN: ICD-10-CM

## 2024-10-24 DIAGNOSIS — E78.5 HYPERLIPIDEMIA, UNSPECIFIED: ICD-10-CM

## 2024-12-30 NOTE — ED ADULT TRIAGE NOTE - TEMPERATURE IN CELSIUS (DEGREES C)
36.7 Assistance OOB with selected safe patient handling equipment if applicable/Assistance with ambulation/Communicate fall risk and risk factors to all staff, patient, and family/Encourage patient to sit up slowly, dangle for a short time, stand at bedside before walking/Monitor gait and stability/Orthostatic vital signs/Provide visual cue: yellow wristband, yellow gown, etc/Reinforce activity limits and safety measures with patient and family/Call bell, personal items and telephone in reach/Instruct patient to call for assistance before getting out of bed/chair/stretcher/Non-slip footwear applied when patient is off stretcher/[place] to call system/Physically safe environment - no spills, clutter or unnecessary equipment/Purposeful Proactive Rounding/Room/bathroom lighting operational, light cord in reach

## 2025-01-13 ENCOUNTER — OUTPATIENT (OUTPATIENT)
Dept: OUTPATIENT SERVICES | Facility: HOSPITAL | Age: 40
LOS: 1 days | End: 2025-01-13
Payer: COMMERCIAL

## 2025-01-13 ENCOUNTER — APPOINTMENT (OUTPATIENT)
Dept: HEPATOLOGY | Facility: CLINIC | Age: 40
End: 2025-01-13
Payer: COMMERCIAL

## 2025-01-13 VITALS
SYSTOLIC BLOOD PRESSURE: 119 MMHG | OXYGEN SATURATION: 99 % | WEIGHT: 166 LBS | HEART RATE: 68 BPM | BODY MASS INDEX: 30.55 KG/M2 | HEIGHT: 62 IN | TEMPERATURE: 97.8 F | DIASTOLIC BLOOD PRESSURE: 77 MMHG

## 2025-01-13 DIAGNOSIS — R74.8 ABNORMAL LEVELS OF OTHER SERUM ENZYMES: ICD-10-CM

## 2025-01-13 DIAGNOSIS — Z00.00 ENCOUNTER FOR GENERAL ADULT MEDICAL EXAMINATION WITHOUT ABNORMAL FINDINGS: ICD-10-CM

## 2025-01-13 PROCEDURE — 99213 OFFICE O/P EST LOW 20 MIN: CPT

## 2025-01-15 DIAGNOSIS — R74.8 ABNORMAL LEVELS OF OTHER SERUM ENZYMES: ICD-10-CM

## 2025-01-15 DIAGNOSIS — K76.0 FATTY (CHANGE OF) LIVER, NOT ELSEWHERE CLASSIFIED: ICD-10-CM

## 2025-01-21 ENCOUNTER — APPOINTMENT (OUTPATIENT)
Dept: CARDIOLOGY | Facility: CLINIC | Age: 40
End: 2025-01-21
Payer: COMMERCIAL

## 2025-01-21 ENCOUNTER — OUTPATIENT (OUTPATIENT)
Dept: OUTPATIENT SERVICES | Facility: HOSPITAL | Age: 40
LOS: 1 days | End: 2025-01-21
Payer: COMMERCIAL

## 2025-01-21 VITALS
WEIGHT: 171 LBS | DIASTOLIC BLOOD PRESSURE: 79 MMHG | HEART RATE: 73 BPM | OXYGEN SATURATION: 98 % | BODY MASS INDEX: 31.47 KG/M2 | HEIGHT: 62 IN | SYSTOLIC BLOOD PRESSURE: 116 MMHG | TEMPERATURE: 97.2 F

## 2025-01-21 DIAGNOSIS — E78.5 HYPERLIPIDEMIA, UNSPECIFIED: ICD-10-CM

## 2025-01-21 DIAGNOSIS — R07.89 OTHER CHEST PAIN: ICD-10-CM

## 2025-01-21 DIAGNOSIS — Z00.00 ENCOUNTER FOR GENERAL ADULT MEDICAL EXAMINATION WITHOUT ABNORMAL FINDINGS: ICD-10-CM

## 2025-01-21 DIAGNOSIS — K76.0 FATTY (CHANGE OF) LIVER, NOT ELSEWHERE CLASSIFIED: ICD-10-CM

## 2025-01-21 DIAGNOSIS — R00.2 PALPITATIONS: ICD-10-CM

## 2025-01-21 PROCEDURE — G2211 COMPLEX E/M VISIT ADD ON: CPT

## 2025-01-21 PROCEDURE — 99214 OFFICE O/P EST MOD 30 MIN: CPT

## 2025-01-22 ENCOUNTER — EMERGENCY (EMERGENCY)
Facility: HOSPITAL | Age: 40
LOS: 0 days | Discharge: HOME | End: 2025-01-23
Attending: EMERGENCY MEDICINE
Payer: MEDICAID

## 2025-01-22 VITALS
SYSTOLIC BLOOD PRESSURE: 139 MMHG | WEIGHT: 167.99 LBS | HEART RATE: 95 BPM | OXYGEN SATURATION: 98 % | DIASTOLIC BLOOD PRESSURE: 86 MMHG | RESPIRATION RATE: 17 BRPM | TEMPERATURE: 98 F

## 2025-01-22 DIAGNOSIS — R00.2 PALPITATIONS: ICD-10-CM

## 2025-01-22 DIAGNOSIS — I44.4 LEFT ANTERIOR FASCICULAR BLOCK: ICD-10-CM

## 2025-01-22 DIAGNOSIS — I45.10 UNSPECIFIED RIGHT BUNDLE-BRANCH BLOCK: ICD-10-CM

## 2025-01-22 LAB
ALBUMIN SERPL ELPH-MCNC: 4.5 G/DL — SIGNIFICANT CHANGE UP (ref 3.5–5.2)
ALP SERPL-CCNC: 78 U/L — SIGNIFICANT CHANGE UP (ref 30–115)
ALT FLD-CCNC: 48 U/L — HIGH (ref 0–41)
ANION GAP SERPL CALC-SCNC: 10 MMOL/L — SIGNIFICANT CHANGE UP (ref 7–14)
AST SERPL-CCNC: 44 U/L — HIGH (ref 0–41)
BASOPHILS # BLD AUTO: 0.04 K/UL — SIGNIFICANT CHANGE UP (ref 0–0.2)
BASOPHILS NFR BLD AUTO: 0.6 % — SIGNIFICANT CHANGE UP (ref 0–1)
BILIRUB SERPL-MCNC: 0.5 MG/DL — SIGNIFICANT CHANGE UP (ref 0.2–1.2)
BUN SERPL-MCNC: 14 MG/DL — SIGNIFICANT CHANGE UP (ref 10–20)
CALCIUM SERPL-MCNC: 8.7 MG/DL — SIGNIFICANT CHANGE UP (ref 8.4–10.5)
CHLORIDE SERPL-SCNC: 104 MMOL/L — SIGNIFICANT CHANGE UP (ref 98–110)
CO2 SERPL-SCNC: 25 MMOL/L — SIGNIFICANT CHANGE UP (ref 17–32)
CREAT SERPL-MCNC: 0.8 MG/DL — SIGNIFICANT CHANGE UP (ref 0.7–1.5)
EGFR: 115 ML/MIN/1.73M2 — SIGNIFICANT CHANGE UP
EOSINOPHIL # BLD AUTO: 0.03 K/UL — SIGNIFICANT CHANGE UP (ref 0–0.7)
EOSINOPHIL NFR BLD AUTO: 0.4 % — SIGNIFICANT CHANGE UP (ref 0–8)
GLUCOSE SERPL-MCNC: 91 MG/DL — SIGNIFICANT CHANGE UP (ref 70–99)
HCT VFR BLD CALC: 43.2 % — SIGNIFICANT CHANGE UP (ref 42–52)
HGB BLD-MCNC: 14.5 G/DL — SIGNIFICANT CHANGE UP (ref 14–18)
IMM GRANULOCYTES NFR BLD AUTO: 0.4 % — HIGH (ref 0.1–0.3)
LYMPHOCYTES # BLD AUTO: 1.72 K/UL — SIGNIFICANT CHANGE UP (ref 1.2–3.4)
LYMPHOCYTES # BLD AUTO: 24.6 % — SIGNIFICANT CHANGE UP (ref 20.5–51.1)
MCHC RBC-ENTMCNC: 28.7 PG — SIGNIFICANT CHANGE UP (ref 27–31)
MCHC RBC-ENTMCNC: 33.6 G/DL — SIGNIFICANT CHANGE UP (ref 32–37)
MCV RBC AUTO: 85.5 FL — SIGNIFICANT CHANGE UP (ref 80–94)
MONOCYTES # BLD AUTO: 0.4 K/UL — SIGNIFICANT CHANGE UP (ref 0.1–0.6)
MONOCYTES NFR BLD AUTO: 5.7 % — SIGNIFICANT CHANGE UP (ref 1.7–9.3)
NEUTROPHILS # BLD AUTO: 4.77 K/UL — SIGNIFICANT CHANGE UP (ref 1.4–6.5)
NEUTROPHILS NFR BLD AUTO: 68.3 % — SIGNIFICANT CHANGE UP (ref 42.2–75.2)
NRBC # BLD: 0 /100 WBCS — SIGNIFICANT CHANGE UP (ref 0–0)
PLATELET # BLD AUTO: 235 K/UL — SIGNIFICANT CHANGE UP (ref 130–400)
PMV BLD: 11.1 FL — HIGH (ref 7.4–10.4)
POTASSIUM SERPL-MCNC: 5.4 MMOL/L — HIGH (ref 3.5–5)
POTASSIUM SERPL-SCNC: 5.4 MMOL/L — HIGH (ref 3.5–5)
PROT SERPL-MCNC: 6.9 G/DL — SIGNIFICANT CHANGE UP (ref 6–8)
RBC # BLD: 5.05 M/UL — SIGNIFICANT CHANGE UP (ref 4.7–6.1)
RBC # FLD: 13.3 % — SIGNIFICANT CHANGE UP (ref 11.5–14.5)
SODIUM SERPL-SCNC: 139 MMOL/L — SIGNIFICANT CHANGE UP (ref 135–146)
TROPONIN T, HIGH SENSITIVITY RESULT: <6 NG/L — SIGNIFICANT CHANGE UP (ref 6–21)
WBC # BLD: 6.99 K/UL — SIGNIFICANT CHANGE UP (ref 4.8–10.8)
WBC # FLD AUTO: 6.99 K/UL — SIGNIFICANT CHANGE UP (ref 4.8–10.8)

## 2025-01-22 PROCEDURE — 36415 COLL VENOUS BLD VENIPUNCTURE: CPT

## 2025-01-22 PROCEDURE — 84484 ASSAY OF TROPONIN QUANT: CPT

## 2025-01-22 PROCEDURE — 71045 X-RAY EXAM CHEST 1 VIEW: CPT | Mod: 26

## 2025-01-22 PROCEDURE — 93005 ELECTROCARDIOGRAM TRACING: CPT

## 2025-01-22 PROCEDURE — 93010 ELECTROCARDIOGRAM REPORT: CPT

## 2025-01-22 PROCEDURE — 71045 X-RAY EXAM CHEST 1 VIEW: CPT

## 2025-01-22 PROCEDURE — 99285 EMERGENCY DEPT VISIT HI MDM: CPT | Mod: 25

## 2025-01-22 PROCEDURE — 85025 COMPLETE CBC W/AUTO DIFF WBC: CPT

## 2025-01-22 PROCEDURE — 80053 COMPREHEN METABOLIC PANEL: CPT

## 2025-01-22 PROCEDURE — 99285 EMERGENCY DEPT VISIT HI MDM: CPT

## 2025-01-22 NOTE — ED PROVIDER NOTE - EKG/XRAY ADDITIONAL INFORMATION
EKG Interpretation by Dr. Jeremiah Woods: normal  EKG: NSR, nml axis, normal intervals, no ST Elevations   Independent interpretation of the chest  film performed by: Dr. Jeremiah Woods: PAU

## 2025-01-22 NOTE — ED PROVIDER NOTE - CARE PROVIDER_API CALL
Roosevelt Campbell  Cardiovascular Disease  [address] [place] [address] [address]  [place] [address]  Phone: [phone]  Fax: [phone]  Follow Up Time: 1-3 Days

## 2025-01-22 NOTE — ED PROVIDER NOTE - OBJECTIVE STATEMENT
38 yo M with no pmhx presenting for palpitations that started about 1 hour to arrival. Patient states he works as a  and was driving a car and all of a sudden he felt a racing sensation to his heart which lasted a few seconds. Denies any chest pains or sob. No fever, chills, abdominal pain, nausea, vomiting, diarrhea, back pain, urinary symptoms, headache, dizziness, paresthesias, or weakness.

## 2025-01-22 NOTE — ED ADULT TRIAGE NOTE - NS ED TRIAGE AVPU SCALE
Alert-The patient is alert, awake and responds to voice. The patient is oriented to time, place, and person. The triage nurse is able to obtain subjective information. Cephalexin Pregnancy And Lactation Text: This medication is Pregnancy Category B and considered safe during pregnancy.  It is also excreted in breast milk but can be used safely for shorter doses.

## 2025-01-22 NOTE — ED PROVIDER NOTE - CLINICAL SUMMARY MEDICAL DECISION MAKING FREE TEXT BOX
Patient here with palpitations no chest pain prior to arrival.  Patient is PERC negative.  CON: ao x 3, HENMT: neck supple,  CV: s1s2 ctab, RESP: cta b/l, GI:  soft, nontender, no rebound, no guarding, SKIN: no rash, MSK: no deformities, NEURO: no gross motor or sensory deficit Psychiatric: appropriate mood, appropriate affect  Impressopm  Pt here with palpitations EKG normal sinus rhythm incomplete right bundle branch block left anterior fascicular block.  Labs reassuring chest unremarkable patient stable for discharge. The pt already has close f/u planned as an outpt.

## 2025-01-22 NOTE — ED PROVIDER NOTE - PATIENT PORTAL LINK FT
You can access the FollowMyHealth Patient Portal offered by [facility] by registering at the following website: [email or web address]. By joining [employer]’s FollowMyHealth portal, you will also be able to view your health information using other applications (apps) compatible with our system.

## 2025-01-22 NOTE — ED PROVIDER NOTE - NSFOLLOWUPINSTRUCTIONS_ED_ALL_ED_FT
Our Emergency Department Referral Coordinators will be reaching out to you in the next 24-48 hours from 9:00am to 5:00pm to schedule a follow up appointment. Please expect a phone call from the hospital in that time frame. If you do not receive a call or if you have any questions or concerns, you can reach them at   [phone].     Palpitations    A palpitation is the feeling that your heartbeat is irregular or is faster than normal. It may feel like your heart is fluttering or skipping a beat. They may be caused by many things, including smoking, caffeine, alcohol, stress, and certain medicines. Although most causes of palpitations are not serious, palpitations can be a sign of a serious medical problem. Avoid caffeine, alcohol, tobacco products at home. Try to reduce stress and anxiety, and make sure to get plenty of rest.     SEEK IMMEDIATE MEDICAL CARE IF YOU HAVE THE FOLLOWING SYMPTOMS: chest pain, shortness of breath, severe headache, or dizziness/lightheadedness.

## 2025-01-24 DIAGNOSIS — E78.5 HYPERLIPIDEMIA, UNSPECIFIED: ICD-10-CM

## 2025-01-24 DIAGNOSIS — K76.0 FATTY (CHANGE OF) LIVER, NOT ELSEWHERE CLASSIFIED: ICD-10-CM

## 2025-01-24 DIAGNOSIS — R07.89 OTHER CHEST PAIN: ICD-10-CM

## 2025-01-24 DIAGNOSIS — R00.2 PALPITATIONS: ICD-10-CM

## 2025-01-27 NOTE — CHART NOTE - NSCHARTNOTEFT_GEN_A_CORE
Appt scheduled 04/22/2025 08:15 AM @ [address] [place] [address], CT 1/27 (cardiology referral).

## 2025-01-29 ENCOUNTER — OUTPATIENT (OUTPATIENT)
Dept: OUTPATIENT SERVICES | Facility: HOSPITAL | Age: 40
LOS: 1 days | End: 2025-01-29
Payer: COMMERCIAL

## 2025-01-29 ENCOUNTER — APPOINTMENT (OUTPATIENT)
Dept: GASTROENTEROLOGY | Facility: CLINIC | Age: 40
End: 2025-01-29
Payer: COMMERCIAL

## 2025-01-29 VITALS
WEIGHT: 167 LBS | BODY MASS INDEX: 30.73 KG/M2 | HEART RATE: 71 BPM | DIASTOLIC BLOOD PRESSURE: 71 MMHG | TEMPERATURE: 98.1 F | OXYGEN SATURATION: 98 % | SYSTOLIC BLOOD PRESSURE: 109 MMHG | HEIGHT: 62 IN

## 2025-01-29 DIAGNOSIS — K59.00 CONSTIPATION, UNSPECIFIED: ICD-10-CM

## 2025-01-29 DIAGNOSIS — K76.0 FATTY (CHANGE OF) LIVER, NOT ELSEWHERE CLASSIFIED: ICD-10-CM

## 2025-01-29 DIAGNOSIS — D36.9 BENIGN NEOPLASM, UNSPECIFIED SITE: ICD-10-CM

## 2025-01-29 DIAGNOSIS — K21.9 GASTRO-ESOPHAGEAL REFLUX DISEASE W/OUT ESOPHAGITIS: ICD-10-CM

## 2025-01-29 DIAGNOSIS — K57.30 DIVERTICULOSIS OF LARGE INTESTINE W/OUT PERFORATION OR ABSCESS W/OUT BLEEDING: ICD-10-CM

## 2025-01-29 DIAGNOSIS — R10.2 PELVIC AND PERINEAL PAIN: ICD-10-CM

## 2025-01-29 DIAGNOSIS — Z00.00 ENCOUNTER FOR GENERAL ADULT MEDICAL EXAMINATION WITHOUT ABNORMAL FINDINGS: ICD-10-CM

## 2025-01-29 PROCEDURE — G2211 COMPLEX E/M VISIT ADD ON: CPT

## 2025-01-29 PROCEDURE — 99214 OFFICE O/P EST MOD 30 MIN: CPT

## 2025-01-30 DIAGNOSIS — K21.9 GASTRO-ESOPHAGEAL REFLUX DISEASE WITHOUT ESOPHAGITIS: ICD-10-CM

## 2025-01-30 DIAGNOSIS — K57.30 DIVERTICULOSIS OF LARGE INTESTINE WITHOUT PERFORATION OR ABSCESS WITHOUT BLEEDING: ICD-10-CM

## 2025-01-30 DIAGNOSIS — K76.0 FATTY (CHANGE OF) LIVER, NOT ELSEWHERE CLASSIFIED: ICD-10-CM

## 2025-01-30 DIAGNOSIS — D36.9 BENIGN NEOPLASM, UNSPECIFIED SITE: ICD-10-CM

## 2025-01-30 DIAGNOSIS — K59.00 CONSTIPATION, UNSPECIFIED: ICD-10-CM

## 2025-01-30 DIAGNOSIS — R10.2 PELVIC AND PERINEAL PAIN: ICD-10-CM

## 2025-02-17 ENCOUNTER — EMERGENCY (EMERGENCY)
Facility: HOSPITAL | Age: 40
LOS: 0 days | Discharge: HOME | End: 2025-02-17
Attending: EMERGENCY MEDICINE
Payer: MEDICAID

## 2025-02-17 VITALS
WEIGHT: 160.94 LBS | SYSTOLIC BLOOD PRESSURE: 123 MMHG | OXYGEN SATURATION: 100 % | RESPIRATION RATE: 18 BRPM | HEART RATE: 70 BPM | HEIGHT: 65 IN | TEMPERATURE: 98 F | DIASTOLIC BLOOD PRESSURE: 83 MMHG

## 2025-02-17 VITALS
SYSTOLIC BLOOD PRESSURE: 115 MMHG | HEART RATE: 64 BPM | DIASTOLIC BLOOD PRESSURE: 80 MMHG | TEMPERATURE: 98 F | OXYGEN SATURATION: 100 % | RESPIRATION RATE: 18 BRPM

## 2025-02-17 DIAGNOSIS — R51.9 HEADACHE, UNSPECIFIED: ICD-10-CM

## 2025-02-17 DIAGNOSIS — R07.89 OTHER CHEST PAIN: ICD-10-CM

## 2025-02-17 LAB
ALBUMIN SERPL ELPH-MCNC: 4.6 G/DL — SIGNIFICANT CHANGE UP (ref 3.5–5.2)
ALP SERPL-CCNC: 80 U/L — SIGNIFICANT CHANGE UP (ref 30–115)
ALT FLD-CCNC: 45 U/L — HIGH (ref 0–41)
ANION GAP SERPL CALC-SCNC: 9 MMOL/L — SIGNIFICANT CHANGE UP (ref 7–14)
AST SERPL-CCNC: 25 U/L — SIGNIFICANT CHANGE UP (ref 0–41)
BASOPHILS # BLD AUTO: 0.03 K/UL — SIGNIFICANT CHANGE UP (ref 0–0.2)
BASOPHILS NFR BLD AUTO: 0.5 % — SIGNIFICANT CHANGE UP (ref 0–1)
BILIRUB SERPL-MCNC: 0.5 MG/DL — SIGNIFICANT CHANGE UP (ref 0.2–1.2)
BUN SERPL-MCNC: 13 MG/DL — SIGNIFICANT CHANGE UP (ref 10–20)
CALCIUM SERPL-MCNC: 9.5 MG/DL — SIGNIFICANT CHANGE UP (ref 8.4–10.5)
CHLORIDE SERPL-SCNC: 101 MMOL/L — SIGNIFICANT CHANGE UP (ref 98–110)
CO2 SERPL-SCNC: 28 MMOL/L — SIGNIFICANT CHANGE UP (ref 17–32)
CREAT SERPL-MCNC: 0.9 MG/DL — SIGNIFICANT CHANGE UP (ref 0.7–1.5)
EGFR: 111 ML/MIN/1.73M2 — SIGNIFICANT CHANGE UP
EOSINOPHIL # BLD AUTO: 0.07 K/UL — SIGNIFICANT CHANGE UP (ref 0–0.7)
EOSINOPHIL NFR BLD AUTO: 1.2 % — SIGNIFICANT CHANGE UP (ref 0–8)
GLUCOSE SERPL-MCNC: 88 MG/DL — SIGNIFICANT CHANGE UP (ref 70–99)
HCT VFR BLD CALC: 46.3 % — SIGNIFICANT CHANGE UP (ref 42–52)
HGB BLD-MCNC: 15.2 G/DL — SIGNIFICANT CHANGE UP (ref 14–18)
IMM GRANULOCYTES NFR BLD AUTO: 0.2 % — SIGNIFICANT CHANGE UP (ref 0.1–0.3)
LYMPHOCYTES # BLD AUTO: 1.61 K/UL — SIGNIFICANT CHANGE UP (ref 1.2–3.4)
LYMPHOCYTES # BLD AUTO: 27.1 % — SIGNIFICANT CHANGE UP (ref 20.5–51.1)
MCHC RBC-ENTMCNC: 28.8 PG — SIGNIFICANT CHANGE UP (ref 27–31)
MCHC RBC-ENTMCNC: 32.8 G/DL — SIGNIFICANT CHANGE UP (ref 32–37)
MCV RBC AUTO: 87.7 FL — SIGNIFICANT CHANGE UP (ref 80–94)
MONOCYTES # BLD AUTO: 0.32 K/UL — SIGNIFICANT CHANGE UP (ref 0.1–0.6)
MONOCYTES NFR BLD AUTO: 5.4 % — SIGNIFICANT CHANGE UP (ref 1.7–9.3)
NEUTROPHILS # BLD AUTO: 3.9 K/UL — SIGNIFICANT CHANGE UP (ref 1.4–6.5)
NEUTROPHILS NFR BLD AUTO: 65.6 % — SIGNIFICANT CHANGE UP (ref 42.2–75.2)
NRBC BLD AUTO-RTO: 0 /100 WBCS — SIGNIFICANT CHANGE UP (ref 0–0)
PLATELET # BLD AUTO: 234 K/UL — SIGNIFICANT CHANGE UP (ref 130–400)
PMV BLD: 10.6 FL — HIGH (ref 7.4–10.4)
POTASSIUM SERPL-MCNC: 4.5 MMOL/L — SIGNIFICANT CHANGE UP (ref 3.5–5)
POTASSIUM SERPL-SCNC: 4.5 MMOL/L — SIGNIFICANT CHANGE UP (ref 3.5–5)
PROT SERPL-MCNC: 7.2 G/DL — SIGNIFICANT CHANGE UP (ref 6–8)
RBC # BLD: 5.28 M/UL — SIGNIFICANT CHANGE UP (ref 4.7–6.1)
RBC # FLD: 13.4 % — SIGNIFICANT CHANGE UP (ref 11.5–14.5)
SODIUM SERPL-SCNC: 138 MMOL/L — SIGNIFICANT CHANGE UP (ref 135–146)
TROPONIN T, HIGH SENSITIVITY RESULT: <6 NG/L — SIGNIFICANT CHANGE UP (ref 6–21)
WBC # BLD: 5.94 K/UL — SIGNIFICANT CHANGE UP (ref 4.8–10.8)
WBC # FLD AUTO: 5.94 K/UL — SIGNIFICANT CHANGE UP (ref 4.8–10.8)

## 2025-02-17 PROCEDURE — 99285 EMERGENCY DEPT VISIT HI MDM: CPT | Mod: 25

## 2025-02-17 PROCEDURE — 85025 COMPLETE CBC W/AUTO DIFF WBC: CPT

## 2025-02-17 PROCEDURE — 93005 ELECTROCARDIOGRAM TRACING: CPT

## 2025-02-17 PROCEDURE — 84484 ASSAY OF TROPONIN QUANT: CPT

## 2025-02-17 PROCEDURE — 71045 X-RAY EXAM CHEST 1 VIEW: CPT

## 2025-02-17 PROCEDURE — 93010 ELECTROCARDIOGRAM REPORT: CPT

## 2025-02-17 PROCEDURE — 99285 EMERGENCY DEPT VISIT HI MDM: CPT

## 2025-02-17 PROCEDURE — 96374 THER/PROPH/DIAG INJ IV PUSH: CPT

## 2025-02-17 PROCEDURE — 71045 X-RAY EXAM CHEST 1 VIEW: CPT | Mod: 26

## 2025-02-17 PROCEDURE — 36415 COLL VENOUS BLD VENIPUNCTURE: CPT

## 2025-02-17 PROCEDURE — 80053 COMPREHEN METABOLIC PANEL: CPT

## 2025-02-17 RX ORDER — METOCLOPRAMIDE 10 MG/1
10 TABLET ORAL ONCE
Refills: 0 | Status: COMPLETED | OUTPATIENT
Start: 2025-02-17 | End: 2025-02-17

## 2025-02-17 RX ORDER — SODIUM CHLORIDE 9 G/ML
1000 INJECTION, SOLUTION INTRAVENOUS ONCE
Refills: 0 | Status: COMPLETED | OUTPATIENT
Start: 2025-02-17 | End: 2025-02-17

## 2025-02-17 RX ADMIN — METOCLOPRAMIDE 104 MILLIGRAM(S): 10 TABLET ORAL at 12:14

## 2025-02-17 RX ADMIN — SODIUM CHLORIDE 1000 MILLILITER(S): 9 INJECTION, SOLUTION INTRAVENOUS at 12:18

## 2025-02-17 NOTE — ED PROVIDER NOTE - CLINICAL SUMMARY MEDICAL DECISION MAKING FREE TEXT BOX
Patient presented with chest pain since yesterday as documented. Otherwise afebrile, Hd stable, well appearing. EKG obtained and non-ischemic without evidence of STEMI. Obtained labs which were grossly unremarkable including no significant leukocytosis, anemia, signs of dehydration/RIK, transaminitis or significant electrolyte abnormalities. Trop negative. Chest xray negative for pneumothorax, pneumonia, widened mediastinum, evidence of rib fractures, enlarged cardiac silhouette or any other emergent pathologies. Patient remained without recurrence of chest pain or abnormalities during monitoring in ED. Ambulatory without difficulty, tolerates PO. HEART Score 0, PERC 0/no PE risk factors, no concern clinically for dissection. Given the above, will discharge home with outpatient follow up. Patient agreeable with plan. Agrees to return to ED for any new or worsening symptoms.

## 2025-02-17 NOTE — ED ADULT NURSE NOTE - OBJECTIVE STATEMENT
Pt with C/O left  side chest pain on and off for 2 weeks worse today with headache nausea  weakness .

## 2025-02-17 NOTE — ED ADULT NURSE NOTE - CAS EDP DISCH TYPE
Problem: Falls - Risk of  Goal: *Absence of Falls  Description: Document Frazier Scheuermann Fall Risk and appropriate interventions in the flowsheet. Outcome: Progressing Towards Goal  Note: Fall Risk Interventions:                                Problem: Pressure Injury - Risk of  Goal: *Prevention of pressure injury  Description: Document Oumar Scale and appropriate interventions in the flowsheet.   Outcome: Progressing Towards Goal  Note: Pressure Injury Interventions:  Sensory Interventions: Assess changes in LOC, Check visual cues for pain    Moisture Interventions: Absorbent underpads, Limit adult briefs, Moisture barrier    Activity Interventions: Assess need for specialty bed, Pressure redistribution bed/mattress(bed type)    Mobility Interventions: Assess need for specialty bed, HOB 30 degrees or less    Nutrition Interventions: Document food/fluid/supplement intake, Offer support with meals,snacks and hydration    Friction and Shear Interventions: HOB 30 degrees or less, Lift sheet, Minimize layers Home

## 2025-02-17 NOTE — ED PROVIDER NOTE - PATIENT PORTAL LINK FT
You can access the FollowMyHealth Patient Portal offered by [facility] by registering at the following website: [email or web address]. By joining [employer]’s FollowMyHealth portal, you will also be able to view your health information using other applications (apps) compatible with our system.

## 2025-02-17 NOTE — ED PROVIDER NOTE - PHYSICAL EXAMINATION
VITAL SIGNS: I have reviewed nursing notes and confirm.  CONSTITUTIONAL: well-appearing, non-toxic, NAD  SKIN: Warm dry, normal skin turgor  HEAD: NCAT  NECK: Supple  CARD: RRR, no murmurs, rubs or gallops  RESP: clear to ausculation b/l.  No rales, rhonchi, or wheezing.  EXT: Full ROM.  PSYCH: Cooperative, appropriate.

## 2025-02-17 NOTE — ED PROVIDER NOTE - OBJECTIVE STATEMENT
39-year-old male with no past medical history, no surgeries, no allergies, presents to the ED due to atraumatic midsternal chest pain that started yesterday as he was driving.  Patient states he has had this pain before, he was seen in the ED and worked up for chest pain with a negative workup.  Patient follow-up with a cardiologist as well.  Patient states pain is in the middle, feels like pressure, last for about 20 minutes and then it resolves.  Patient also complaining of increased headache.  Patient states that he had similar episodes about a year ago and he was diagnosed with panic attacks, he was prescribed anxiolytics but he stopped taking them after a few weeks.

## 2025-02-17 NOTE — ED ADULT NURSE REASSESSMENT NOTE - NS ED NURSE REASSESS COMMENT FT1
Pt reassessed A/O times 4 Vs stable report felling slight better breathing better denies chest pain denies SOB ,no N/V no dizziness no headache clear to go home with privet transport .

## 2025-02-17 NOTE — ED ADULT TRIAGE NOTE - SPO2 (%)
[address] (), Cardiology; Internal Medicine  [address] [facility]  [address]  [place] [address]  Phone: [phone]  Fax: [phone]    Kylee Browne), HematologyOncology; [facility] Medicine; Internal Medicine  [address] [place] [address]  Phone: [phone]  Fax: [phone]

## 2025-02-20 ENCOUNTER — APPOINTMENT (OUTPATIENT)
Dept: GASTROENTEROLOGY | Facility: CLINIC | Age: 40
End: 2025-02-20

## 2025-02-20 DIAGNOSIS — R74.8 ABNORMAL LEVELS OF OTHER SERUM ENZYMES: ICD-10-CM

## 2025-02-20 DIAGNOSIS — K76.0 FATTY (CHANGE OF) LIVER, NOT ELSEWHERE CLASSIFIED: ICD-10-CM

## 2025-02-20 PROCEDURE — 91200 LIVER ELASTOGRAPHY: CPT

## 2025-02-24 NOTE — ASU PREOP CHECKLIST - TAMPON REMOVED
Quality 410: Psoriasis Clinical Response To Oral Systemic Or Biologic Medications: Patient has been on biologic or system therapy for less than 6 months
Quality 176: Tuberculosis Screening Prior To First Course Of Biologic And/Or Immune Response Modifier Therapy: Patient receiving first-time biologic and/or immune response modifier therapy, TB Screening Performed and Results Interpreted within 12 months
Detail Level: Simple
n/a

## 2025-03-04 ENCOUNTER — EMERGENCY (EMERGENCY)
Facility: HOSPITAL | Age: 40
LOS: 0 days | Discharge: HOME | End: 2025-03-04
Attending: EMERGENCY MEDICINE
Payer: MEDICAID

## 2025-03-04 VITALS
RESPIRATION RATE: 18 BRPM | WEIGHT: 160.94 LBS | SYSTOLIC BLOOD PRESSURE: 135 MMHG | TEMPERATURE: 98 F | DIASTOLIC BLOOD PRESSURE: 82 MMHG | HEART RATE: 68 BPM | HEIGHT: 65 IN | OXYGEN SATURATION: 99 %

## 2025-03-04 DIAGNOSIS — T15.01XA FOREIGN BODY IN CORNEA, RIGHT EYE, INITIAL ENCOUNTER: ICD-10-CM

## 2025-03-04 DIAGNOSIS — E78.5 HYPERLIPIDEMIA, UNSPECIFIED: ICD-10-CM

## 2025-03-04 DIAGNOSIS — S00.251A SUPERFICIAL FOREIGN BODY OF RIGHT EYELID AND PERIOCULAR AREA, INITIAL ENCOUNTER: ICD-10-CM
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
## 2025-03-04 PROCEDURE — 99284 EMERGENCY DEPT VISIT MOD MDM: CPT

## 2025-03-04 PROCEDURE — 99283 EMERGENCY DEPT VISIT LOW MDM: CPT | Mod: 25

## 2025-03-04 PROCEDURE — 65220 REMOVE FOREIGN BODY FROM EYE: CPT | Mod: XU

## 2025-03-04 PROCEDURE — 67938 REMOVE EYELID FOREIGN BODY: CPT

## 2025-03-04 RX ORDER — POLYMYXIN B SULFATE AND TRIMETHOPRIM SULFATE 10000; 1 [USP'U]/ML; MG/ML
1 SOLUTION/ DROPS OPHTHALMIC ONCE
Refills: 0 | Status: COMPLETED | OUTPATIENT
Start: 2025-03-04 | End: 2025-03-04

## 2025-03-04 RX ADMIN — POLYMYXIN B SULFATE AND TRIMETHOPRIM SULFATE 1 DROP(S): 10000; 1 SOLUTION/ DROPS OPHTHALMIC at 19:44

## 2025-03-04 NOTE — ED PROVIDER NOTE - NSFOLLOWUPCLINICS_GEN_ALL_ED_FT
[facility]  Ophthalmolgy  [address], [address]  [place] [address]  Phone: [phone]  Fax:

## 2025-03-04 NOTE — ED ADULT NURSE NOTE - CCCP TRG CHIEF CMPLNT
Patient arrived to floor. Dr. Martinez Lab notified of Potassium of 7.7. Spoke with bedside dialysis RN, estimated she will be to floor by 2300 to begin a run for patient. Discussed AM lab times with phlebotomy department. Planning for between [address] Chance Bland and 0730. foreign body eye

## 2025-03-04 NOTE — ED PROVIDER NOTE - OBJECTIVE STATEMENT
39-year-old male history of hyperlipidemia presenting to ER with foreign body sensation to right eyelid.  Patient was at work at 10 AM when he felt something get into eye.  No contact lens use.  He denies any vision changes, eye redness tearing, nausea or vomiting, pain with eye movement.

## 2025-03-04 NOTE — ED PROVIDER NOTE - PHYSICAL EXAMINATION
CONSTITUTIONAL: Well-appearing; well-nourished; in no apparent distress.   EYES: PERRL; EOM intact. rt eye: small fb noted to cornea and upper eyelid. FBs removed with qtip. + small corneal abrasion noted. VA 20/25 in b/l eyes  ENT: normal nose; no rhinorrhea; normal pharynx with no tonsillar hypertrophy.   NECK: Supple; non-tender; no cervical lymphadenopathy. .   CARDIOVASCULAR: Normal S1, S2; no murmurs, rubs, or gallops.   RESPIRATORY: Normal chest excursion with respiration; breath sounds clear and equal bilaterally; no wheezes, rhonchi, or rales.  MS: No evidence of trauma or deformity. Normal ROM in all four extremities; non-tender to palpation; distal pulses are normal.   SKIN: Normal for age and race; warm; dry; good turgor; no apparent lesions or exudate.   NEURO/PSYCH: A & O x 4; grossly unremarkable. mood and manner are appropriate. Grooming and personal hygiene are appropriate. No apparent thoughts of harm to self or others.

## 2025-03-04 NOTE — ED PROVIDER NOTE - CLINICAL SUMMARY MEDICAL DECISION MAKING FREE TEXT BOX
39-year-old male with history of HLD presents as he states he felt a speck of something on his right eyelid and since then has had an irritated feeling to the right eye. Denies vision changes and all other symptoms. On exam, afebrile, hemodynamically stable, saturating well on room air, NAD, well appearing, sitting comfortably in bed, no WOB, speaking full sentences, head NCAT, PERRL, EOMI, noted right eye corneal foreign body to the upper medial portion, mild conjunctival irritation, anicteric, MMM, breathing comfortably on room air, AAO, CN's 3-12 grossly intact, ELDRIDGE spontaneously, skin warm, well perfused, no rashes or hives. Noted 2 small foreign bodies which were removed. No evidence of infection. Character low suspicion for glaucoma or temporal arteritis. Patient is well appearing, NAD, afebrile, hemodynamically stable. Discharged with instructions in further symptomatic care, return precautions, and need for ophthalmology f/u.

## 2025-03-05 ENCOUNTER — OUTPATIENT (OUTPATIENT)
Dept: OUTPATIENT SERVICES | Facility: HOSPITAL | Age: 40
LOS: 1 days | End: 2025-03-05

## 2025-03-05 ENCOUNTER — APPOINTMENT (OUTPATIENT)
Dept: OPHTHALMOLOGY | Facility: CLINIC | Age: 40
End: 2025-03-05

## 2025-03-05 DIAGNOSIS — H53.8 OTHER VISUAL DISTURBANCES: ICD-10-CM

## 2025-03-18 ENCOUNTER — APPOINTMENT (OUTPATIENT)
Dept: INTERNAL MEDICINE | Facility: CLINIC | Age: 40
End: 2025-03-18

## 2025-03-26 NOTE — ED ADULT NURSE NOTE - CAS DISCH ACCOMP BY
We will send lab results    Increase exercise as able    Call/return to clinic as needed           Patient Education   Preventive Care Advice   This is general advice given by our system to help you stay healthy. However, your care team may have specific advice just for you. Please talk to your care team about your preventive care needs.  Nutrition  Eat 5 or more servings of fruits and vegetables each day.  Try wheat bread, brown rice and whole grain pasta (instead of white bread, rice, and pasta).  Get enough calcium and vitamin D. Check the label on foods and aim for 100% of the RDA (recommended daily allowance).  Lifestyle  Exercise at least 150 minutes each week  (30 minutes a day, 5 days a week).  Do muscle strengthening activities 2 days a week. These help control your weight and prevent disease.  No smoking.  Wear sunscreen to prevent skin cancer.  Have a dental exam and cleaning every 6 months.  Yearly exams  See your health care team every year to talk about:  Any changes in your health.  Any medicines your care team has prescribed.  Preventive care, family planning, and ways to prevent chronic diseases.  Shots (vaccines)   HPV shots (up to age 26), if you've never had them before.  Hepatitis B shots (up to age 59), if you've never had them before.  COVID-19 shot: Get this shot when it's due.  Flu shot: Get a flu shot every year.  Tetanus shot: Get a tetanus shot every 10 years.  Pneumococcal, hepatitis A, and RSV shots: Ask your care team if you need these based on your risk.  Shingles shot (for age 50 and up)  General health tests  Diabetes screening:  Starting at age 35, Get screened for diabetes at least every 3 years.  If you are younger than age 35, ask your care team if you should be screened for diabetes.  Cholesterol test: At age 39, start having a cholesterol test every 5 years, or more often if advised.  Bone density scan (DEXA): At age 50, ask your care team if you should have this scan for  osteoporosis (brittle bones).  Hepatitis C: Get tested at least once in your life.  STIs (sexually transmitted infections)  Before age 24: Ask your care team if you should be screened for STIs.  After age 24: Get screened for STIs if you're at risk. You are at risk for STIs (including HIV) if:  You are sexually active with more than one person.  You don't use condoms every time.  You or a partner was diagnosed with a sexually transmitted infection.  If you are at risk for HIV, ask about PrEP medicine to prevent HIV.  Get tested for HIV at least once in your life, whether you are at risk for HIV or not.  Cancer screening tests  Cervical cancer screening: If you have a cervix, begin getting regular cervical cancer screening tests starting at age 21.  Breast cancer scan (mammogram): If you've ever had breasts, begin having regular mammograms starting at age 40. This is a scan to check for breast cancer.  Colon cancer screening: It is important to start screening for colon cancer at age 45.  Have a colonoscopy test every 10 years (or more often if you're at risk) Or, ask your provider about stool tests like a FIT test every year or Cologuard test every 3 years.  To learn more about your testing options, visit:   .  For help making a decision, visit:   https://bit.ly/fk02572.  Prostate cancer screening test: If you have a prostate, ask your care team if a prostate cancer screening test (PSA) at age 55 is right for you.  Lung cancer screening: If you are a current or former smoker ages 50 to 80, ask your care team if ongoing lung cancer screenings are right for you.  For informational purposes only. Not to replace the advice of your health care provider. Copyright   2023 [employer]. All rights reserved. Clinically reviewed by the [facility]. [employer] [id number] - REV 01/24.      Self

## 2025-04-22 ENCOUNTER — APPOINTMENT (OUTPATIENT)
Dept: CARDIOLOGY | Facility: CLINIC | Age: 40
End: 2025-04-22
Payer: COMMERCIAL

## 2025-04-22 ENCOUNTER — OUTPATIENT (OUTPATIENT)
Dept: OUTPATIENT SERVICES | Facility: HOSPITAL | Age: 40
LOS: 1 days | End: 2025-04-22
Payer: COMMERCIAL

## 2025-04-22 VITALS
DIASTOLIC BLOOD PRESSURE: 83 MMHG | TEMPERATURE: 97.6 F | BODY MASS INDEX: 30.73 KG/M2 | HEART RATE: 72 BPM | OXYGEN SATURATION: 98 % | WEIGHT: 167 LBS | HEIGHT: 62 IN | SYSTOLIC BLOOD PRESSURE: 123 MMHG

## 2025-04-22 DIAGNOSIS — Z00.00 ENCOUNTER FOR GENERAL ADULT MEDICAL EXAMINATION WITHOUT ABNORMAL FINDINGS: ICD-10-CM

## 2025-04-22 DIAGNOSIS — E78.5 HYPERLIPIDEMIA, UNSPECIFIED: ICD-10-CM

## 2025-04-22 DIAGNOSIS — R00.2 PALPITATIONS: ICD-10-CM

## 2025-04-22 DIAGNOSIS — R07.9 CHEST PAIN, UNSPECIFIED: ICD-10-CM

## 2025-04-22 DIAGNOSIS — Z00.00 ENCOUNTER FOR GENERAL ADULT MEDICAL EXAMINATION W/OUT ABNORMAL FINDINGS: ICD-10-CM

## 2025-04-22 PROCEDURE — 99214 OFFICE O/P EST MOD 30 MIN: CPT

## 2025-04-22 NOTE — ED ADULT NURSE NOTE - CADM POA PRESS ULCER
"HERE for PE  Pending monitor review by cardiology.  Consideration of cessation of Eliquis and Toprol depends on these results  ROS :   Denies fevers chills sweats malaise fatigue  Denies headache dysarthria aphasia focal weakness  Denies neck pain stiffness swollen glands sore throat otalgia otorrhea facial pressure  Denies chest pain shortness of breath diaphoresis nausea palpitations syncope presyncope dyspnea on exertion orthopnea cough  Denies abdominal pain nausea vomiting heartburn constipation diarrhea stool changes melena hematochezia  Denies urinary frequency dysuria hematuria pyuria hesitancy dribbling nocturia hematuria   Denies scrotal pain testicular mass erectile dysfunction  Denies numbness tingling weakness ataxia loss of dexterity  Denies depression anxiety insomnia      PE:    /81   Pulse 73   Temp 36.6 °C (97.9 °F)   Ht 1.854 m (6' 1\")   Wt 108 kg (238 lb)   SpO2 97%   BMI 31.40 kg/m²      Appears comfortable  Not ill-appearing  Normocephalic atraumatic  Respirations normal  No retractions  Skin  without pallor petechia icterus cyanosis  Neck without JVD thyromegaly bruits adenopathy  Chest clear to auscultation without rales rhonchi wheeze  Heart regular rate and rhythm without murmur  Abdomen soft nondistended nontender without organomegaly or mass   testes descended without mass or hernia  Rectal without mass or heme  Prostate normal without enlargement nodularity bogginess asymmetry or mass  Extremities without erythema edema Homans or cord  Peripheral pulses palpable  Neuro cranial nerves II through XII intact   no sensory  motor loss or tremor  Gait normal   Affect normal  Does not appear anxious or depressed          "
No

## 2025-04-25 DIAGNOSIS — E78.5 HYPERLIPIDEMIA, UNSPECIFIED: ICD-10-CM

## 2025-04-25 DIAGNOSIS — R00.2 PALPITATIONS: ICD-10-CM

## 2025-04-25 DIAGNOSIS — Z00.00 ENCOUNTER FOR GENERAL ADULT MEDICAL EXAMINATION WITHOUT ABNORMAL FINDINGS: ICD-10-CM

## 2025-04-25 DIAGNOSIS — R07.9 CHEST PAIN, UNSPECIFIED: ICD-10-CM

## 2025-05-12 ENCOUNTER — EMERGENCY (EMERGENCY)
Facility: HOSPITAL | Age: 40
LOS: 0 days | Discharge: HOME | End: 2025-05-13
Attending: STUDENT IN AN ORGANIZED HEALTH CARE EDUCATION/TRAINING PROGRAM
Payer: MEDICAID

## 2025-05-12 VITALS
SYSTOLIC BLOOD PRESSURE: 137 MMHG | TEMPERATURE: 98 F | RESPIRATION RATE: 18 BRPM | HEIGHT: 61 IN | HEART RATE: 78 BPM | WEIGHT: 166.89 LBS | OXYGEN SATURATION: 98 % | DIASTOLIC BLOOD PRESSURE: 92 MMHG

## 2025-05-12 DIAGNOSIS — E78.5 HYPERLIPIDEMIA, UNSPECIFIED: ICD-10-CM

## 2025-05-12 DIAGNOSIS — R07.89 OTHER CHEST PAIN: ICD-10-CM

## 2025-05-12 DIAGNOSIS — R00.2 PALPITATIONS: ICD-10-CM

## 2025-05-12 LAB
ALBUMIN SERPL ELPH-MCNC: 4.1 G/DL — SIGNIFICANT CHANGE UP (ref 3.5–5.2)
ALP SERPL-CCNC: 75 U/L — SIGNIFICANT CHANGE UP (ref 30–115)
ALT FLD-CCNC: 33 U/L — SIGNIFICANT CHANGE UP (ref 0–41)
ANION GAP SERPL CALC-SCNC: 10 MMOL/L — SIGNIFICANT CHANGE UP (ref 7–14)
AST SERPL-CCNC: 28 U/L — SIGNIFICANT CHANGE UP (ref 0–41)
BASOPHILS # BLD AUTO: 0.03 K/UL — SIGNIFICANT CHANGE UP (ref 0–0.2)
BASOPHILS NFR BLD AUTO: 0.5 % — SIGNIFICANT CHANGE UP (ref 0–1)
BILIRUB SERPL-MCNC: 0.5 MG/DL — SIGNIFICANT CHANGE UP (ref 0.2–1.2)
BUN SERPL-MCNC: 12 MG/DL — SIGNIFICANT CHANGE UP (ref 10–20)
CALCIUM SERPL-MCNC: 8.6 MG/DL — SIGNIFICANT CHANGE UP (ref 8.4–10.5)
CHLORIDE SERPL-SCNC: 104 MMOL/L — SIGNIFICANT CHANGE UP (ref 98–110)
CO2 SERPL-SCNC: 24 MMOL/L — SIGNIFICANT CHANGE UP (ref 17–32)
CREAT SERPL-MCNC: 0.7 MG/DL — SIGNIFICANT CHANGE UP (ref 0.7–1.5)
EGFR: 119 ML/MIN/1.73M2 — SIGNIFICANT CHANGE UP
EGFR: 119 ML/MIN/1.73M2 — SIGNIFICANT CHANGE UP
EOSINOPHIL # BLD AUTO: 0.1 K/UL — SIGNIFICANT CHANGE UP (ref 0–0.7)
EOSINOPHIL NFR BLD AUTO: 1.6 % — SIGNIFICANT CHANGE UP (ref 0–8)
GLUCOSE SERPL-MCNC: 90 MG/DL — SIGNIFICANT CHANGE UP (ref 70–99)
HCT VFR BLD CALC: 41.7 % — LOW (ref 42–52)
HGB BLD-MCNC: 14.2 G/DL — SIGNIFICANT CHANGE UP (ref 14–18)
IMM GRANULOCYTES NFR BLD AUTO: 0.2 % — SIGNIFICANT CHANGE UP (ref 0.1–0.3)
LYMPHOCYTES # BLD AUTO: 1.65 K/UL — SIGNIFICANT CHANGE UP (ref 1.2–3.4)
LYMPHOCYTES # BLD AUTO: 25.9 % — SIGNIFICANT CHANGE UP (ref 20.5–51.1)
MAGNESIUM SERPL-MCNC: 2.1 MG/DL — SIGNIFICANT CHANGE UP (ref 1.8–2.4)
MCHC RBC-ENTMCNC: 28.7 PG — SIGNIFICANT CHANGE UP (ref 27–31)
MCHC RBC-ENTMCNC: 34.1 G/DL — SIGNIFICANT CHANGE UP (ref 32–37)
MCV RBC AUTO: 84.4 FL — SIGNIFICANT CHANGE UP (ref 80–94)
MONOCYTES # BLD AUTO: 0.43 K/UL — SIGNIFICANT CHANGE UP (ref 0.1–0.6)
MONOCYTES NFR BLD AUTO: 6.7 % — SIGNIFICANT CHANGE UP (ref 1.7–9.3)
NEUTROPHILS # BLD AUTO: 4.16 K/UL — SIGNIFICANT CHANGE UP (ref 1.4–6.5)
NEUTROPHILS NFR BLD AUTO: 65.1 % — SIGNIFICANT CHANGE UP (ref 42.2–75.2)
NRBC BLD AUTO-RTO: 0 /100 WBCS — SIGNIFICANT CHANGE UP (ref 0–0)
PLATELET # BLD AUTO: 207 K/UL — SIGNIFICANT CHANGE UP (ref 130–400)
PMV BLD: 10.4 FL — SIGNIFICANT CHANGE UP (ref 7.4–10.4)
POTASSIUM SERPL-MCNC: 4.4 MMOL/L — SIGNIFICANT CHANGE UP (ref 3.5–5)
POTASSIUM SERPL-SCNC: 4.4 MMOL/L — SIGNIFICANT CHANGE UP (ref 3.5–5)
PROT SERPL-MCNC: 7.1 G/DL — SIGNIFICANT CHANGE UP (ref 6–8)
RBC # BLD: 4.94 M/UL — SIGNIFICANT CHANGE UP (ref 4.7–6.1)
RBC # FLD: 13.3 % — SIGNIFICANT CHANGE UP (ref 11.5–14.5)
SODIUM SERPL-SCNC: 138 MMOL/L — SIGNIFICANT CHANGE UP (ref 135–146)
TROPONIN T, HIGH SENSITIVITY RESULT: <6 NG/L — SIGNIFICANT CHANGE UP (ref 6–21)
WBC # BLD: 6.38 K/UL — SIGNIFICANT CHANGE UP (ref 4.8–10.8)
WBC # FLD AUTO: 6.38 K/UL — SIGNIFICANT CHANGE UP (ref 4.8–10.8)

## 2025-05-12 PROCEDURE — 85025 COMPLETE CBC W/AUTO DIFF WBC: CPT

## 2025-05-12 PROCEDURE — 80053 COMPREHEN METABOLIC PANEL: CPT

## 2025-05-12 PROCEDURE — 36415 COLL VENOUS BLD VENIPUNCTURE: CPT

## 2025-05-12 PROCEDURE — 83735 ASSAY OF MAGNESIUM: CPT

## 2025-05-12 PROCEDURE — 99285 EMERGENCY DEPT VISIT HI MDM: CPT | Mod: 25

## 2025-05-12 PROCEDURE — 99285 EMERGENCY DEPT VISIT HI MDM: CPT

## 2025-05-12 PROCEDURE — 93005 ELECTROCARDIOGRAM TRACING: CPT

## 2025-05-12 PROCEDURE — 84484 ASSAY OF TROPONIN QUANT: CPT

## 2025-05-12 PROCEDURE — 71045 X-RAY EXAM CHEST 1 VIEW: CPT

## 2025-05-12 PROCEDURE — 71045 X-RAY EXAM CHEST 1 VIEW: CPT | Mod: 26

## 2025-05-12 PROCEDURE — 93010 ELECTROCARDIOGRAM REPORT: CPT

## 2025-05-12 RX ORDER — SODIUM CHLORIDE 9 G/1000ML
1000 INJECTION, SOLUTION INTRAVENOUS ONCE
Refills: 0 | Status: COMPLETED | OUTPATIENT
Start: 2025-05-12 | End: 2025-05-12

## 2025-05-12 RX ADMIN — SODIUM CHLORIDE 1000 MILLILITER(S): 9 INJECTION, SOLUTION INTRAVENOUS at 22:00

## 2025-05-13 VITALS
SYSTOLIC BLOOD PRESSURE: 109 MMHG | HEART RATE: 67 BPM | OXYGEN SATURATION: 99 % | DIASTOLIC BLOOD PRESSURE: 77 MMHG | RESPIRATION RATE: 18 BRPM | TEMPERATURE: 98 F

## 2025-05-13 LAB — TROPONIN T, HIGH SENSITIVITY RESULT: <6 NG/L — SIGNIFICANT CHANGE UP (ref 6–21)

## 2025-05-13 NOTE — ED PROVIDER NOTE - BIRTH SEX
Hospitalist Progress Note    NAME: Carmen Mckeon   :  1948   MRN:  [id number]       Assessment / Plan:  Anemia: Acute on Chronic  -Hgb has down-trended from approximately 8.3 on  to 6.1 karen on  s/p 1 unit pRBC's with Hgb 7.5 today and no clinical bleeding  -continue to monitor    Acute hypoxic respiratory failure due to acute pulmonary edema  Acute on Chronic Diastolic CHF  -portable CXR repeated this am for completeness  -18 TTE: \"Left ventricle: Ejection fraction was estimated in the range of 60 %. No obvious wall motion abnormalities identified in the views obtained. There was mild concentric hypertrophy. Features were consistent with a pseudonormal left ventricular filling pattern, with concomitant abnormal relaxation and increased filling pressure (grade 2 diastolic dysfunction). \"  -no longer on antibiotics/tamiflu (completed treatment)  -appreciate pulmonology assistance  -Levophed weaned to off around 6 pm on   -patient has been weaned off of O2    TIFFANIE, resolved  Hypernatremia; resolved with hypotonic IVF's  -IVF's discontinued early this am    Acute encephalopathy and septic shock due to Pan-susecptible E. Coli UTI and Influenza B, present on admission:   - MRI brain with mild small vessel ischemic changes. No acute abnormality. - CT chest/abd/pelvis with severe scoliosis. Atelectasis in the lower lobes, left greater than right. Anemia. Atherosclerotic abdominal aorta without aneurysm. - UA >960005 pansensitive E Coli, blood cultures no growth. Completed course of Zosyn. - influenza B positive - tamiflu finished   - EEG with cerebral dysfunction with no seizure activity  - appreciate neuro consult, recommending con't keppra for now - AMS is multifactorial in nature  -tolerating Pureed with thins    Hypoglycemia:  H/o \"borderline DM,\" not on meds at home.  HgA1c <3.5.  Improved  - continue to monitor POC glucose; gluconse trends adequate at this time      Elevated Transaminases:  Presumed due to sepsis, improving  Baseline mental retardation  Lupus: Continue holding Plaquenil  Hypothyroidism:  continue ynthroid   Obesity (Body mass index is 32.45 kg/(m^2)      Code Status: partial -Palliative consult appreciated  Surrogate Decision Maker: sister   DVT Prophylaxis: sq heparin      Baseline: Mental retardation, lives with sister normally but placed in group home after sister had to undergo recent back surgery     Subjective:     Chief Complaint / Reason for Physician Visit: follow-up hypotension  Patient seen for follow-up before transfer out of ICU  Case discussed with RN  Still awaiting pRBC's    Review of Systems:  Symptom Y/N Comments  Symptom Y/N Comments   Fever/Chills    Chest Pain     Poor Appetite    Edema     Cough    Abdominal Pain     Sputum    Joint Pain     SOB/YOUNG    Pruritis/Rash     Nausea/vomit    Tolerating PT/OT     Diarrhea    Tolerating Diet     Constipation    Other       Could NOT obtain due to: MR, non-verbal     Objective:     VITALS:   Last 24hrs VS reviewed since prior progress note.  Most recent are:  Patient Vitals for the past 24 hrs:   Temp Pulse Resp BP SpO2   02/15/18 1640 97.1 °F (36.2 °C) 69 18 105/62 99 %   02/15/18 1515 98.4 °F (36.9 °C) 74 18 100/50 100 %   02/15/18 1400 - 78 25 107/61 98 %   02/15/18 1300 - 69 23 (!) 83/66 99 %   02/15/18 1200 98.7 °F (37.1 °C) 71 25 102/51 100 %   02/15/18 1100 - 76 23 109/87 99 %   02/15/18 1000 - 78 22 111/46 99 %   02/15/18 0900 - 80 25 114/44 98 %   02/15/18 0800 - 88 27 (!) 165/93 98 %   02/15/18 0730 - - - - 100 %   02/15/18 0700 98.2 °F (36.8 °C) 96 21 136/75 100 %   02/15/18 0600 - 74 19 117/77 100 %   02/15/18 0500 - 67 21 124/46 100 %   02/15/18 0415 97.6 °F (36.4 °C) 65 21 116/45 100 %   02/15/18 0300 - 64 20 128/57 100 %   02/15/18 0200 - 65 22 (!) 125/31 100 %   02/15/18 0103 - 65 20 135/55 100 %   02/15/18 0000 97.6 °F (36.4 °C) 67 22 119/43 100 %   02/14/18 2300 - 66 21 112/45 100 % 02/14/18 2200 - 74 23 (!) 97/33 100 %   02/14/18 2100 - 71 24 100/40 100 %   02/14/18 2001 - - - - 99 %   02/14/18 2000 97.6 °F (36.4 °C) 77 26 112/44 100 %   02/14/18 1912 - 73 22 100/83 100 %       Intake/Output Summary (Last 24 hours) at 02/15/18 1814  Last data filed at 02/15/18 1400   Gross per 24 hour   Intake             1440 ml   Output             1110 ml   Net              330 ml        PHYSICAL EXAM:  General:                    Altered; severe kyphoscolosis  EENT:                       Anicteric sclerae, PERRL  Resp:                        No Wheezing. Frequent shallow respirations  CV:                            Regular  rhythm,  No edema  GI:                              Soft, Non distended, Non tender.  +Bowel sounds  Neurologic:                Alert but does not speak, does not follow any commands  Psych:                       No insight  Skin:                          No rashes.  No jaundice    Reviewed most current lab test results and cultures  YES  Reviewed most current radiology test results   YES  Review and summation of old records today    NO  Reviewed patient's current orders and MAR    YES  PMH/SH reviewed - no change compared to H&P  ________________________________________________________________________  Care Plan discussed with:    Comments   Patient x    Family      RN x    Care Manager     Consultant                        Multidiciplinary team rounds were held today with , nursing, pharmacist and clinical coordinator. Patient's plan of care was discussed; medications were reviewed and discharge planning was addressed.      ________________________________________________________________________  Total NON critical care TIME:  25   Minutes    Total CRITICAL CARE TIME Spent:   Minutes non procedure based      Comments   >50% of visit spent in counseling and coordination of care     ________________________________________________________________________  Trevor Meo, MD     Procedures: see electronic medical records for all procedures/Xrays and details which were not copied into this note but were reviewed prior to creation of Plan. LABS:  I reviewed today's most current labs and imaging studies.   Pertinent labs include:  Recent Labs      02/15/18   0411  02/14/18   0300  02/13/18   0326   WBC  7.0  8.6  10.6   HGB  7.5*  6.1*  7.0*   HCT  23.6*  20.3*  23.3*   PLT  239  226  231     Recent Labs      02/15/18   0411  02/14/18   0300  02/13/18   0326   NA  143  146*  156*   K  3.5  3.4*  3.8   CL  109*  110*  116*   CO2  28  32  35*   GLU  73  107*  100   BUN  22*  36*  45*   CREA  1.08*  1.26*  1.41*   CA  7.9*  8.0*  8.6   MG   --   2.4  2.6*   PHOS   --   2.5*  3.2   ALB   --   1.6*  1.8*   TBILI   --   0.5  0.9   SGOT   --   66*  47*   ALT   --   40  36       Signed: Trevor Workman MD Male

## 2025-05-13 NOTE — ED PROVIDER NOTE - OBJECTIVE STATEMENT
39 year old male, past medical history hdl, who presents with chest pain. patient with intermittent palpitations and chest pain x3 months. denies f/c, cough, hemoptysis, shortness of breath, vomiting, diarrhea, leg pain/swelling. non-smoker.

## 2025-05-13 NOTE — ED PROVIDER NOTE - PHYSICAL EXAMINATION
CONSTITUTIONAL: non-toxic appearing male, nad   SKIN: skin exam is warm and dry  ENT: MMM   CARD: S1, S2 normal, no murmur  RESP: No wheezes, rales or rhonchi. Good air movement bilaterally  ABD: soft; non-distended; non-tender.   EXT: Normal ROM.   NEURO: awake, alert, following commands, oriented, grossly unremarkable. No Focal deficits. GCS 15.   PSYCH: Cooperative

## 2025-05-13 NOTE — ED PROVIDER NOTE - CARE PROVIDERS DIRECT ADDRESSES
,DirectAddress_Unknown,[email or web address] ,[email or web address]_[email or web address]

## 2025-05-13 NOTE — ED PROVIDER NOTE - CARE PROVIDER_API CALL
Renato Aldridge  Cardiology  [address], [address]  [place] [address]  Phone: [phone]  Fax: [phone]  Follow Up Time: 4-6 Days    Troy Rangel  Internal Medicine  [address]  [place] [address]  Phone: [phone]  Fax: [phone]  Follow Up Time: 1-3 Days   Renato Aldridge  Cardiology  [address], [address]  [place] [address]  Phone: [phone]  Fax: [phone]  Follow Up Time: 4-6 Days    Troy Rangel  Internal Medicine  [address]  [place] [address]  Phone: [phone]  Fax: [phone]  Follow Up Time: 1-3 Days    Femi Moran  Cardiac Electrophysiology  [address], [address]  [place] [address]  Phone: [phone]  Fax: [phone]  Follow Up Time: 1-3 Days

## 2025-05-13 NOTE — ED PROVIDER NOTE - PROVIDER TOKENS
PROVIDER:[TOKEN:[[id number]],FOLLOWUP:[4-6 Days]],PROVIDER:[TOKEN:[[id number]],FOLLOWUP:[1-3 Days]] PROVIDER:[TOKEN:[[id number]],FOLLOWUP:[4-6 Days]],PROVIDER:[TOKEN:[[id number]],FOLLOWUP:[1-3 Days]],PROVIDER:[TOKEN:[[id number]],FOLLOWUP:[1-3 Days]]

## 2025-05-13 NOTE — ED PROVIDER NOTE - CLINICAL SUMMARY MEDICAL DECISION MAKING FREE TEXT BOX
39 year old male, past medical history hdl, who presents with chest pain. patient with intermittent palpitations and chest pain x3 months- nonexertional, no fam hx of CAD. had a normal stress test and echo within the last 12 months. exam unremarkable. ekg independently interpreted by me Dr. Huff showing NSR, no stemi. labs unremarkable including trop neg x 2. chest xray independently interpreted by me Dr. Huff showing no focal opacities. stable for dc. follow up with cardiology.

## 2025-05-13 NOTE — ED PROVIDER NOTE - PATIENT PORTAL LINK FT
You can access the FollowMyHealth Patient Portal offered by [facility] by registering at the following website: [email or web address]. By joining [employer]’s FollowMyHealth portal, you will also be able to view your health information using other applications (apps) compatible with our system.

## 2025-05-13 NOTE — ED PROVIDER NOTE - NSFOLLOWUPINSTRUCTIONS_ED_ALL_ED_FT
Please follow up with your primary care doctor and cardiology in 1-2 weeks  Please be aware of any new or worsening signs or symptoms that should prompt your return to the ER.    Nonspecific Chest Pain  Chest pain can be caused by many different conditions. There is always a chance that your pain could be related to something serious, such as a heart attack or a blood clot in your lungs. Chest pain can also be caused by conditions that are not life-threatening. If you have chest pain, it is very important to follow up with your health care provider.    What are the causes?  Causes of this condition include:    Heartburn.  Pneumonia or bronchitis.  Anxiety or stress.  Inflammation around your heart (pericarditis) or lung (pleuritis or pleurisy).  A blood clot in your lung.  A collapsed lung (pneumothorax). This can develop suddenly on its own (spontaneous pneumothorax) or from trauma to the chest.  Shingles infection (varicella-zoster virus).  Heart attack.  Damage to the bones, muscles, and cartilage that make up your chest wall. This can include:    Bruised bones due to injury.  Strained muscles or cartilage due to frequent or repeated coughing or overwork.  Fracture to one or more ribs.  Sore cartilage due to inflammation (costochondritis).      What increases the risk?  Risk factors for this condition may include:    Activities that increase your risk for trauma or injury to your chest.  Respiratory infections or conditions that cause frequent coughing.  Medical conditions or overeating that can cause heartburn.  Heart disease or family history of heart disease.  Conditions or health behaviors that increase your risk of developing a blood clot.  Having had chicken pox (varicella zoster).    What are the signs or symptoms?  Chest pain can feel like:    Burning or tingling on the surface of your chest or deep in your chest.  Crushing, pressure, aching, or squeezing pain.  Dull or sharp pain that is worse when you move, cough, or take a deep breath.  Pain that is also felt in your back, neck, shoulder, or arm, or pain that spreads to any of these areas.    Your chest pain may come and go, or it may stay constant.    How is this diagnosed?  Lab tests or other studies may be needed to find the cause of your pain. Your health care provider may have you take a test called an ECG (electrocardiogram). An ECG records your heartbeat patterns at the time the test is performed. You may also have other tests, such as:    Transthoracic echocardiogram (TTE). In this test, sound waves are used to create a picture of the heart structures and to look at how blood flows through your heart.  Transesophageal echocardiogram (FLOWER). This is a more advanced imaging test that takes images from inside your body. It allows your health care provider to see your heart in finer detail.  Cardiac monitoring. This allows your health care provider to monitor your heart rate and rhythm in real time.  Holter monitor. This is a portable device that records your heartbeat and can help to diagnose abnormal heartbeats. It allows your health care provider to track your heart activity for several days, if needed.  Stress tests. These can be done through exercise or by taking medicine that makes your heart beat more quickly.  Blood tests.  Other imaging tests.    How is this treated?  Treatment depends on what is causing your chest pain. Treatment may include:    Medicines. These may include:    Acid blockers for heartburn.  Anti-inflammatory medicine.  Pain medicine for inflammatory conditions.  Antibiotic medicine, if an infection is present.  Medicines to dissolve blood clots.  Medicines to treat coronary artery disease (CAD).    Supportive care for conditions that do not require medicines. This may include:    Resting.  Applying heat or cold packs to injured areas.  Limiting activities until pain decreases.      Follow these instructions at home:  Medicines     If you were prescribed an antibiotic, take it as told by your health care provider. Do not stop taking the antibiotic even if you start to feel better.  Take over-the-counter and prescription medicines only as told by your health care provider.  Lifestyle     Do not use any products that contain nicotine or tobacco, such as cigarettes and e-cigarettes. If you need help quitting, ask your health care provider.  Do not drink alcohol.  ImageMake lifestyle changes as directed by your health care provider. These may include:    Getting regular exercise. Ask your health care provider to suggest some activities that are safe for you.  Eating a heart-healthy diet. A registered dietitian can help you to learn healthy eating options.  Maintaining a healthy weight.  Managing diabetes, if necessary.  Reducing stress, such as with yoga or relaxation techniques.    General instructions     Avoid any activities that bring on chest pain.  If heartburn is the cause for your chest pain, raise (elevate) the head of your bed about 6 inches (15 cm) by putting blocks under the legs. Sleeping with more pillows does not effectively relieve heartburn because it only changes the position of your head.  Keep all follow-up visits as told by your health care provider. This is important. This includes any further testing if your chest pain does not go away.  Contact a health care provider if:  Your chest pain does not go away.  You have a rash with blisters on your chest.  You have a fever.  You have chills.  Get help right away if:  Your chest pain is worse.  You have a cough that gets worse, or you cough up blood.  You have severe pain in your abdomen.  You have severe weakness.  You faint.  You have sudden, unexplained chest discomfort.  You have sudden, unexplained discomfort in your arms, back, neck, or jaw.  You have shortness of breath at any time.  You suddenly start to sweat, or your skin gets clammy.  You feel nauseous or you vomit.  You suddenly feel light-headed or dizzy.  Your heart begins to beat quickly, or it feels like it is skipping beats.  These symptoms may represent a serious problem that is an emergency. Do not wait to see if the symptoms will go away. Get medical help right away. Call your local emergency services (911 in the [place] Do not drive yourself to the hospital.     This information is not intended to replace advice given to you by your health care provider. Make sure you discuss any questions you have with your health care provider.

## 2025-05-30 ENCOUNTER — NON-APPOINTMENT (OUTPATIENT)
Age: 40
End: 2025-05-30

## 2025-06-02 ENCOUNTER — EMERGENCY (EMERGENCY)
Facility: HOSPITAL | Age: 40
LOS: 0 days | Discharge: HOME | End: 2025-06-02
Attending: STUDENT IN AN ORGANIZED HEALTH CARE EDUCATION/TRAINING PROGRAM
Payer: MEDICAID

## 2025-06-02 VITALS
SYSTOLIC BLOOD PRESSURE: 121 MMHG | OXYGEN SATURATION: 98 % | DIASTOLIC BLOOD PRESSURE: 75 MMHG | RESPIRATION RATE: 17 BRPM | HEART RATE: 81 BPM | TEMPERATURE: 98 F

## 2025-06-02 VITALS
RESPIRATION RATE: 18 BRPM | TEMPERATURE: 98 F | HEIGHT: 61 IN | DIASTOLIC BLOOD PRESSURE: 81 MMHG | SYSTOLIC BLOOD PRESSURE: 123 MMHG | OXYGEN SATURATION: 99 % | WEIGHT: 166.89 LBS | HEART RATE: 83 BPM

## 2025-06-02 DIAGNOSIS — R07.9 CHEST PAIN, UNSPECIFIED: ICD-10-CM

## 2025-06-02 DIAGNOSIS — R07.89 OTHER CHEST PAIN: ICD-10-CM

## 2025-06-02 DIAGNOSIS — E78.5 HYPERLIPIDEMIA, UNSPECIFIED: ICD-10-CM

## 2025-06-02 LAB
ALBUMIN SERPL ELPH-MCNC: 4.2 G/DL — SIGNIFICANT CHANGE UP (ref 3.5–5.2)
ALP SERPL-CCNC: 94 U/L — SIGNIFICANT CHANGE UP (ref 30–115)
ALT FLD-CCNC: 25 U/L — SIGNIFICANT CHANGE UP (ref 0–41)
ANION GAP SERPL CALC-SCNC: 15 MMOL/L — HIGH (ref 7–14)
AST SERPL-CCNC: 21 U/L — SIGNIFICANT CHANGE UP (ref 0–41)
BASOPHILS # BLD AUTO: 0.04 K/UL — SIGNIFICANT CHANGE UP (ref 0–0.2)
BASOPHILS NFR BLD AUTO: 0.7 % — SIGNIFICANT CHANGE UP (ref 0–1)
BILIRUB SERPL-MCNC: 0.4 MG/DL — SIGNIFICANT CHANGE UP (ref 0.2–1.2)
BUN SERPL-MCNC: 13 MG/DL — SIGNIFICANT CHANGE UP (ref 10–20)
CALCIUM SERPL-MCNC: 9 MG/DL — SIGNIFICANT CHANGE UP (ref 8.4–10.5)
CHLORIDE SERPL-SCNC: 104 MMOL/L — SIGNIFICANT CHANGE UP (ref 98–110)
CO2 SERPL-SCNC: 24 MMOL/L — SIGNIFICANT CHANGE UP (ref 17–32)
CREAT SERPL-MCNC: 0.9 MG/DL — SIGNIFICANT CHANGE UP (ref 0.7–1.5)
EGFR: 111 ML/MIN/1.73M2 — SIGNIFICANT CHANGE UP
EGFR: 111 ML/MIN/1.73M2 — SIGNIFICANT CHANGE UP
EOSINOPHIL # BLD AUTO: 0.1 K/UL — SIGNIFICANT CHANGE UP (ref 0–0.7)
EOSINOPHIL NFR BLD AUTO: 1.7 % — SIGNIFICANT CHANGE UP (ref 0–8)
GLUCOSE SERPL-MCNC: 123 MG/DL — HIGH (ref 70–99)
HCT VFR BLD CALC: 42.3 % — SIGNIFICANT CHANGE UP (ref 42–52)
HGB BLD-MCNC: 14.2 G/DL — SIGNIFICANT CHANGE UP (ref 14–18)
IMM GRANULOCYTES NFR BLD AUTO: 0.3 % — SIGNIFICANT CHANGE UP (ref 0.1–0.3)
LYMPHOCYTES # BLD AUTO: 1.49 K/UL — SIGNIFICANT CHANGE UP (ref 1.2–3.4)
LYMPHOCYTES # BLD AUTO: 25.9 % — SIGNIFICANT CHANGE UP (ref 20.5–51.1)
MCHC RBC-ENTMCNC: 28.5 PG — SIGNIFICANT CHANGE UP (ref 27–31)
MCHC RBC-ENTMCNC: 33.6 G/DL — SIGNIFICANT CHANGE UP (ref 32–37)
MCV RBC AUTO: 84.9 FL — SIGNIFICANT CHANGE UP (ref 80–94)
MONOCYTES # BLD AUTO: 0.33 K/UL — SIGNIFICANT CHANGE UP (ref 0.1–0.6)
MONOCYTES NFR BLD AUTO: 5.7 % — SIGNIFICANT CHANGE UP (ref 1.7–9.3)
NEUTROPHILS # BLD AUTO: 3.77 K/UL — SIGNIFICANT CHANGE UP (ref 1.4–6.5)
NEUTROPHILS NFR BLD AUTO: 65.7 % — SIGNIFICANT CHANGE UP (ref 42.2–75.2)
NRBC BLD AUTO-RTO: 0 /100 WBCS — SIGNIFICANT CHANGE UP (ref 0–0)
PLATELET # BLD AUTO: 229 K/UL — SIGNIFICANT CHANGE UP (ref 130–400)
PMV BLD: 10.8 FL — HIGH (ref 7.4–10.4)
POTASSIUM SERPL-MCNC: 4.4 MMOL/L — SIGNIFICANT CHANGE UP (ref 3.5–5)
POTASSIUM SERPL-SCNC: 4.4 MMOL/L — SIGNIFICANT CHANGE UP (ref 3.5–5)
PROT SERPL-MCNC: 6.6 G/DL — SIGNIFICANT CHANGE UP (ref 6–8)
RBC # BLD: 4.98 M/UL — SIGNIFICANT CHANGE UP (ref 4.7–6.1)
RBC # FLD: 13.5 % — SIGNIFICANT CHANGE UP (ref 11.5–14.5)
SODIUM SERPL-SCNC: 143 MMOL/L — SIGNIFICANT CHANGE UP (ref 135–146)
TROPONIN T, HIGH SENSITIVITY RESULT: <6 NG/L — SIGNIFICANT CHANGE UP (ref 6–21)
TROPONIN T, HIGH SENSITIVITY RESULT: <6 NG/L — SIGNIFICANT CHANGE UP (ref 6–21)
WBC # BLD: 5.75 K/UL — SIGNIFICANT CHANGE UP (ref 4.8–10.8)
WBC # FLD AUTO: 5.75 K/UL — SIGNIFICANT CHANGE UP (ref 4.8–10.8)

## 2025-06-02 PROCEDURE — 99285 EMERGENCY DEPT VISIT HI MDM: CPT | Mod: 25

## 2025-06-02 PROCEDURE — 99285 EMERGENCY DEPT VISIT HI MDM: CPT

## 2025-06-02 PROCEDURE — 84484 ASSAY OF TROPONIN QUANT: CPT

## 2025-06-02 PROCEDURE — 71045 X-RAY EXAM CHEST 1 VIEW: CPT

## 2025-06-02 PROCEDURE — 71045 X-RAY EXAM CHEST 1 VIEW: CPT | Mod: 26

## 2025-06-02 PROCEDURE — 93005 ELECTROCARDIOGRAM TRACING: CPT

## 2025-06-02 PROCEDURE — 85025 COMPLETE CBC W/AUTO DIFF WBC: CPT

## 2025-06-02 PROCEDURE — 93010 ELECTROCARDIOGRAM REPORT: CPT

## 2025-06-02 PROCEDURE — 80053 COMPREHEN METABOLIC PANEL: CPT

## 2025-06-02 PROCEDURE — 36415 COLL VENOUS BLD VENIPUNCTURE: CPT

## 2025-06-02 PROCEDURE — 36000 PLACE NEEDLE IN VEIN: CPT

## 2025-06-02 NOTE — ED PROVIDER NOTE - PHYSICAL EXAMINATION
CONSTITUTIONAL: Well-appearing; well-nourished; in no apparent distress.   EYES: PERRL; EOM intact.   ENT: normal nose; no rhinorrhea; normal pharynx with no tonsillar hypertrophy.   NECK: Supple; non-tender; no cervical lymphadenopathy.  CARDIOVASCULAR: Normal S1, S2; no murmurs, rubs, or gallops. Equal radial pulses  RESPIRATORY: Normal chest excursion with respiration; breath sounds clear and equal bilaterally; no wheezes, rhonchi, or rales.  GI/: Normal bowel sounds; non-distended; non-tender; no palpable organomegaly.   MS: No evidence of trauma or deformity.  Normal ROM in all four extremities; non-tender to palpation; distal pulses are normal.   SKIN: Normal for age and race; warm; dry; good turgor; no apparent lesions or exudate.   NEURO/PSYCH: A & O x 4; grossly unremarkable. mood and manner are appropriate. Grooming and personal hygiene are appropriate. No apparent thoughts of harm to self or others.

## 2025-06-02 NOTE — ED PROVIDER NOTE - PROGRESS NOTE DETAILS
Shared decision making utilized and HEART score discussed.  Patient electing for outpatient management and will follow up immediately with cardiology.  Risk of MACE discussed and patient understands this risk. Strict return precautions discussed and patient aware they can return at any time for re-evaluation and possible admission. EKG and results discussed.

## 2025-06-02 NOTE — ED PROVIDER NOTE - OBJECTIVE STATEMENT
39 year olod M with hx of hld not on meds presenting to ed for eval of CP. Pt sts two hours ago started to have L sided chest pain described as pressure with radiation to L arm. Symptoms have been intermittent and gradually improving. No assoc sob, fever/uri symptoms, leg pain/swelling, recent travel, tobacco use, n/v/d, syncope. Pt had neg nuc stress an echo 08/25.

## 2025-06-02 NOTE — ED PROVIDER NOTE - PATIENT PORTAL LINK FT
You can access the FollowMyHealth Patient Portal offered by [facility] by registering at the following website: [email or web address]. By joining [employer]’s FollowMyHealth portal, you will also be able to view your health information using other applications (apps) compatible with our system.

## 2025-06-02 NOTE — ED PROVIDER NOTE - CLINICAL SUMMARY MEDICAL DECISION MAKING FREE TEXT BOX
39 yr old m that presents with chest pain. Pt asymptomatic. ekg unchanged. labs, imaging, Labs and EKG were ordered and reviewed.  Imaging was ordered and reviewed by me.  Appropriate medications for patient's presenting complaints were ordered and effects were reassessed.  Patient's records (prior hospital, ED visit, and/or nursing home notes if available) were reviewed.  Additional history was obtained from EMS, family, and/or PCP (where available).  Escalation to admission/observation was considered.   However patient feels much better and is comfortable with discharge.  Appropriate follow-up was arranged.    I have discussed the discharge plan with the patient. The patient agrees with the plan, as discussed.  The patient understands Emergency Department diagnosis is a preliminary diagnosis often based on limited information and that the patient must adhere to the follow-up plan as discussed.  The patient understands that if the symptoms worsen or if prescribed medications do not have the desired/planned effect that the patient may return to the Emergency Department at any time for further evaluation and treatment.

## 2025-06-02 NOTE — ED PROVIDER NOTE - CARE PROVIDER_API CALL
Jorge Moore  Interventional Cardiology  [address] [place] [id number]  Phone: [phone]  Fax: [phone]  Follow Up Time:

## 2025-06-02 NOTE — ED PROVIDER NOTE - ATTENDING APP SHARED VISIT CONTRIBUTION OF CARE
39 yr old m w/ a pmh significant for hld who presents w/ chest pain. Pt states that the symptoms started two hours ago, L sided chest pain that radiates down the L arm. Pt does report that the symptoms have improved. Of note, pt had a nuc stress and echo on 8/25, which was unremarkable.     VITAL SIGNS: I have reviewed nursing notes and confirm.  CONSTITUTIONAL: non-toxic, well appearing  SKIN: no rash, no petechiae.  EYES: EOMI, pink conjunctiva, anicteric  ENT: tongue midline, no exudates, MMM  NECK: Supple; no meningismus, no JVD  CARD: RRR, no murmurs, equal radial pulses bilaterally 2+  RESP: CTAB, no respiratory distress  ABD: Soft, non-tender, non-distended, no peritoneal signs, no HSM, no CVA tenderness      39 yr old m that presents with chest pain. Pt asymptomatic. ekg unchanged. labs, imaging, reassess. dispo pending.

## 2025-06-02 NOTE — ED ADULT NURSE NOTE - NSFALLUNIVINTERV_ED_ALL_ED
Bed/Stretcher in lowest position, wheels locked, appropriate side rails in place/Call bell, personal items and telephone in reach/Instruct patient to call for assistance before getting out of bed/chair/stretcher/Non-slip footwear applied when patient is off stretcher/[place] to call system/Physically safe environment - no spills, clutter or unnecessary equipment/Purposeful proactive rounding/Room/bathroom lighting operational, light cord in reach

## 2025-06-04 ENCOUNTER — OUTPATIENT (OUTPATIENT)
Dept: OUTPATIENT SERVICES | Facility: HOSPITAL | Age: 40
LOS: 1 days | End: 2025-06-04
Payer: COMMERCIAL

## 2025-06-04 ENCOUNTER — APPOINTMENT (OUTPATIENT)
Dept: INTERNAL MEDICINE | Facility: CLINIC | Age: 40
End: 2025-06-04
Payer: COMMERCIAL

## 2025-06-04 VITALS
SYSTOLIC BLOOD PRESSURE: 116 MMHG | WEIGHT: 164 LBS | HEART RATE: 73 BPM | TEMPERATURE: 97.7 F | BODY MASS INDEX: 30 KG/M2 | OXYGEN SATURATION: 98 % | DIASTOLIC BLOOD PRESSURE: 78 MMHG

## 2025-06-04 DIAGNOSIS — F41.9 ANXIETY DISORDER, UNSPECIFIED: ICD-10-CM

## 2025-06-04 DIAGNOSIS — R07.89 OTHER CHEST PAIN: ICD-10-CM

## 2025-06-04 DIAGNOSIS — K21.9 GASTRO-ESOPHAGEAL REFLUX DISEASE W/OUT ESOPHAGITIS: ICD-10-CM

## 2025-06-04 DIAGNOSIS — E78.5 HYPERLIPIDEMIA, UNSPECIFIED: ICD-10-CM

## 2025-06-04 DIAGNOSIS — Z00.00 ENCOUNTER FOR GENERAL ADULT MEDICAL EXAMINATION WITHOUT ABNORMAL FINDINGS: ICD-10-CM

## 2025-06-04 PROCEDURE — G2211 COMPLEX E/M VISIT ADD ON: CPT

## 2025-06-04 PROCEDURE — 99213 OFFICE O/P EST LOW 20 MIN: CPT

## 2025-06-04 RX ORDER — HYDROXYZINE HYDROCHLORIDE 25 MG/1
25 TABLET ORAL
Qty: 84 | Refills: 0 | Status: ACTIVE | COMMUNITY
Start: 2025-06-04 | End: 1900-01-01

## 2025-06-10 DIAGNOSIS — R07.89 OTHER CHEST PAIN: ICD-10-CM

## 2025-06-10 DIAGNOSIS — E78.5 HYPERLIPIDEMIA, UNSPECIFIED: ICD-10-CM

## 2025-06-10 DIAGNOSIS — K21.9 GASTRO-ESOPHAGEAL REFLUX DISEASE WITHOUT ESOPHAGITIS: ICD-10-CM

## 2025-06-28 ENCOUNTER — EMERGENCY (EMERGENCY)
Facility: HOSPITAL | Age: 40
LOS: 0 days | Discharge: HOME | End: 2025-06-28
Attending: EMERGENCY MEDICINE
Payer: MEDICAID

## 2025-06-28 VITALS
WEIGHT: 166.89 LBS | HEART RATE: 65 BPM | RESPIRATION RATE: 18 BRPM | SYSTOLIC BLOOD PRESSURE: 131 MMHG | TEMPERATURE: 98 F | DIASTOLIC BLOOD PRESSURE: 79 MMHG | OXYGEN SATURATION: 99 % | HEIGHT: 61 IN

## 2025-06-28 DIAGNOSIS — K92.1 MELENA: ICD-10-CM

## 2025-06-28 DIAGNOSIS — K59.00 CONSTIPATION, UNSPECIFIED: ICD-10-CM

## 2025-06-28 DIAGNOSIS — H26.9 UNSPECIFIED CATARACT: Chronic | ICD-10-CM

## 2025-06-28 DIAGNOSIS — K64.4 RESIDUAL HEMORRHOIDAL SKIN TAGS: ICD-10-CM

## 2025-06-28 DIAGNOSIS — E78.5 HYPERLIPIDEMIA, UNSPECIFIED: ICD-10-CM

## 2025-06-28 PROCEDURE — 99283 EMERGENCY DEPT VISIT LOW MDM: CPT

## 2025-06-28 PROCEDURE — 99282 EMERGENCY DEPT VISIT SF MDM: CPT

## 2025-07-02 ENCOUNTER — TRANSCRIPTION ENCOUNTER (OUTPATIENT)
Age: 40
End: 2025-07-02

## 2025-07-02 ENCOUNTER — OUTPATIENT (OUTPATIENT)
Dept: OUTPATIENT SERVICES | Facility: HOSPITAL | Age: 40
LOS: 1 days | Discharge: HOME | End: 2025-07-02
Payer: COMMERCIAL

## 2025-07-02 ENCOUNTER — RESULT REVIEW (OUTPATIENT)
Age: 40
End: 2025-07-02

## 2025-07-02 VITALS
OXYGEN SATURATION: 98 % | HEIGHT: 65 IN | HEART RATE: 90 BPM | WEIGHT: 166.89 LBS | SYSTOLIC BLOOD PRESSURE: 120 MMHG | RESPIRATION RATE: 18 BRPM | DIASTOLIC BLOOD PRESSURE: 79 MMHG | TEMPERATURE: 99 F

## 2025-07-02 VITALS
RESPIRATION RATE: 18 BRPM | HEART RATE: 82 BPM | SYSTOLIC BLOOD PRESSURE: 113 MMHG | DIASTOLIC BLOOD PRESSURE: 72 MMHG | OXYGEN SATURATION: 96 %

## 2025-07-02 DIAGNOSIS — H26.9 UNSPECIFIED CATARACT: Chronic | ICD-10-CM

## 2025-07-02 DIAGNOSIS — D36.9 BENIGN NEOPLASM, UNSPECIFIED SITE: ICD-10-CM

## 2025-07-02 DIAGNOSIS — Z98.890 OTHER SPECIFIED POSTPROCEDURAL STATES: Chronic | ICD-10-CM

## 2025-07-02 PROCEDURE — 88305 TISSUE EXAM BY PATHOLOGIST: CPT | Mod: 26

## 2025-07-02 PROCEDURE — 88305 TISSUE EXAM BY PATHOLOGIST: CPT

## 2025-07-02 PROCEDURE — 45385 COLONOSCOPY W/LESION REMOVAL: CPT

## 2025-07-02 NOTE — ASU DISCHARGE PLAN (ADULT/PEDIATRIC) - FINANCIAL ASSISTANCE
[facility] provides services at a reduced cost to those who are determined to be eligible through [facility]’s financial assistance program. Information regarding [facility]’s financial assistance program can be found by going to [email or web address] or by calling [phone].

## 2025-07-02 NOTE — H&P PST ADULT - ASSESSMENT
38yo M w/a PMH of H Pylori gastritis s/p eradication 2019, GERD, fatty liver, nummular eczema, acne, Anxiety, DLD is here for follow up  Had colonoscopy in 2020 showing 5mm TA in DC  Scheduled for repeat colonoscopy

## 2025-07-02 NOTE — CHART NOTE - NSCHARTNOTEFT_GEN_A_CORE
PACU ANESTHESIA ADMISSION NOTE      Procedure: Colonoscopy  Post op diagnosis:  colon polyp      __x__  Patent Airway    ____  Full return of protective reflexes    ____  Full recovery from anesthesia / back to baseline     Vitals:   T:   98.1 F        R:        15          BP:    99/56              Sat:    98%               P: 91      Mental Status:  ____ Awake   __x___ Alert   _____ Drowsy   _____ Sedated    Nausea/Vomiting:  ____ NO  ______Yes,   See Post - Op Orders          Pain Scale (0-10):  _____    Treatment: ____ None    ___x_ See Post - Op/PCA Orders    Post - Operative Fluids:   ____ Oral   __x__ See Post - Op Orders    Plan: Discharge:   __x__Home       _____Floor     _____Critical Care    _____  Other:_________________    Comments: Pt tolerated procedure well, no anesthesia related complications. Care of pt endorsed to PACU, report given to PACU RN. Discharge when criteria are met.
PACU ANESTHESIA ADMISSION NOTE      Procedure: colonoscopy  Post op diagnosis:      ____  Intubated  TV:______       Rate: ______      FiO2: ______    __x__  Patent Airway    ____  Full return of protective reflexes    ____  Full recovery from anesthesia / back to baseline     Vitals:   T:   97.5 F        R:   12               BP:      92/56            Sat:    98%               P: 72      Mental Status:  __x__ Awake   _____ Alert   _____ Drowsy   _____ Sedated    Nausea/Vomiting:  ____ NO  ______Yes,   See Post - Op Orders          Pain Scale (0-10):  _____    Treatment: ____ None    ___x_ See Post - Op/PCA Orders    Post - Operative Fluids:   ____ Oral   __x__ See Post - Op Orders    Plan: Discharge:   _x___Home       _____Floor     _____Critical Care    _____  Other:_________________    Comments: Pt tolerated procedure well, no anesthesia related complications. Care of pt endorsed to PACU, report given to PACU RN. Discharge when criteria are met.

## 2025-07-07 LAB — SURGICAL PATHOLOGY STUDY: SIGNIFICANT CHANGE UP

## 2025-07-09 DIAGNOSIS — D12.5 BENIGN NEOPLASM OF SIGMOID COLON: ICD-10-CM

## 2025-07-09 DIAGNOSIS — K64.4 RESIDUAL HEMORRHOIDAL SKIN TAGS: ICD-10-CM

## 2025-07-09 DIAGNOSIS — K21.9 GASTRO-ESOPHAGEAL REFLUX DISEASE WITHOUT ESOPHAGITIS: ICD-10-CM

## 2025-07-09 DIAGNOSIS — E78.5 HYPERLIPIDEMIA, UNSPECIFIED: ICD-10-CM

## 2025-07-09 DIAGNOSIS — K64.8 OTHER HEMORRHOIDS: ICD-10-CM

## 2025-07-09 DIAGNOSIS — K57.30 DIVERTICULOSIS OF LARGE INTESTINE WITHOUT PERFORATION OR ABSCESS WITHOUT BLEEDING: ICD-10-CM

## 2025-07-09 DIAGNOSIS — Z12.11 ENCOUNTER FOR SCREENING FOR MALIGNANT NEOPLASM OF COLON: ICD-10-CM

## 2025-07-14 ENCOUNTER — OUTPATIENT (OUTPATIENT)
Dept: OUTPATIENT SERVICES | Facility: HOSPITAL | Age: 40
LOS: 1 days | End: 2025-07-14
Payer: COMMERCIAL

## 2025-07-14 ENCOUNTER — APPOINTMENT (OUTPATIENT)
Dept: HEPATOLOGY | Facility: CLINIC | Age: 40
End: 2025-07-14
Payer: COMMERCIAL

## 2025-07-14 ENCOUNTER — NON-APPOINTMENT (OUTPATIENT)
Age: 40
End: 2025-07-14

## 2025-07-14 VITALS
DIASTOLIC BLOOD PRESSURE: 64 MMHG | SYSTOLIC BLOOD PRESSURE: 101 MMHG | HEIGHT: 62 IN | HEART RATE: 80 BPM | BODY MASS INDEX: 30.18 KG/M2 | OXYGEN SATURATION: 100 % | TEMPERATURE: 97.7 F | WEIGHT: 164 LBS

## 2025-07-14 DIAGNOSIS — Z00.00 ENCOUNTER FOR GENERAL ADULT MEDICAL EXAMINATION WITHOUT ABNORMAL FINDINGS: ICD-10-CM

## 2025-07-14 DIAGNOSIS — Z98.890 OTHER SPECIFIED POSTPROCEDURAL STATES: Chronic | ICD-10-CM

## 2025-07-14 DIAGNOSIS — H26.9 UNSPECIFIED CATARACT: Chronic | ICD-10-CM

## 2025-07-14 PROBLEM — K76.0 METABOLIC DYSFUNCTION-ASSOCIATED STEATOTIC LIVER DISEASE (MASLD): Status: ACTIVE | Noted: 2025-07-14

## 2025-07-14 PROBLEM — F41.9 ANXIETY DISORDER, UNSPECIFIED: Chronic | Status: ACTIVE | Noted: 2025-07-02

## 2025-07-14 PROBLEM — R74.8 ELEVATED ALKALINE PHOSPHATASE LEVEL: Status: ACTIVE | Noted: 2025-07-14

## 2025-07-14 PROCEDURE — 99214 OFFICE O/P EST MOD 30 MIN: CPT

## 2025-07-22 DIAGNOSIS — K76.0 FATTY (CHANGE OF) LIVER, NOT ELSEWHERE CLASSIFIED: ICD-10-CM

## 2025-07-22 DIAGNOSIS — R74.8 ABNORMAL LEVELS OF OTHER SERUM ENZYMES: ICD-10-CM

## 2025-07-30 ENCOUNTER — OUTPATIENT (OUTPATIENT)
Dept: OUTPATIENT SERVICES | Facility: HOSPITAL | Age: 40
LOS: 1 days | End: 2025-07-30
Payer: COMMERCIAL

## 2025-07-30 ENCOUNTER — APPOINTMENT (OUTPATIENT)
Dept: GASTROENTEROLOGY | Facility: CLINIC | Age: 40
End: 2025-07-30
Payer: COMMERCIAL

## 2025-07-30 ENCOUNTER — NON-APPOINTMENT (OUTPATIENT)
Age: 40
End: 2025-07-30

## 2025-07-30 VITALS
OXYGEN SATURATION: 98 % | DIASTOLIC BLOOD PRESSURE: 74 MMHG | HEART RATE: 69 BPM | WEIGHT: 160 LBS | BODY MASS INDEX: 29.44 KG/M2 | HEIGHT: 62 IN | SYSTOLIC BLOOD PRESSURE: 112 MMHG

## 2025-07-30 DIAGNOSIS — H26.9 UNSPECIFIED CATARACT: Chronic | ICD-10-CM

## 2025-07-30 DIAGNOSIS — K57.30 DIVERTICULOSIS OF LARGE INTESTINE W/OUT PERFORATION OR ABSCESS W/OUT BLEEDING: ICD-10-CM

## 2025-07-30 DIAGNOSIS — K21.9 GASTRO-ESOPHAGEAL REFLUX DISEASE W/OUT ESOPHAGITIS: ICD-10-CM

## 2025-07-30 DIAGNOSIS — R10.32 LEFT LOWER QUADRANT PAIN: ICD-10-CM

## 2025-07-30 DIAGNOSIS — D36.9 BENIGN NEOPLASM, UNSPECIFIED SITE: ICD-10-CM

## 2025-07-30 DIAGNOSIS — Z00.00 ENCOUNTER FOR GENERAL ADULT MEDICAL EXAMINATION WITHOUT ABNORMAL FINDINGS: ICD-10-CM

## 2025-07-30 DIAGNOSIS — Z98.890 OTHER SPECIFIED POSTPROCEDURAL STATES: Chronic | ICD-10-CM

## 2025-07-30 PROCEDURE — 99213 OFFICE O/P EST LOW 20 MIN: CPT

## 2025-07-30 RX ORDER — POLYETHYLENE GLYCOL 3350 17 G/17G
17 POWDER, FOR SOLUTION ORAL TWICE DAILY
Qty: 17 | Refills: 5 | Status: ACTIVE | COMMUNITY
Start: 2025-07-30 | End: 1900-01-01

## 2025-08-01 DIAGNOSIS — K21.9 GASTRO-ESOPHAGEAL REFLUX DISEASE WITHOUT ESOPHAGITIS: ICD-10-CM

## 2025-08-01 DIAGNOSIS — R10.32 LEFT LOWER QUADRANT PAIN: ICD-10-CM

## 2025-08-01 DIAGNOSIS — K57.30 DIVERTICULOSIS OF LARGE INTESTINE WITHOUT PERFORATION OR ABSCESS WITHOUT BLEEDING: ICD-10-CM

## 2025-08-01 DIAGNOSIS — D36.9 BENIGN NEOPLASM, UNSPECIFIED SITE: ICD-10-CM

## 2025-08-22 ENCOUNTER — APPOINTMENT (OUTPATIENT)
Dept: GASTROENTEROLOGY | Facility: CLINIC | Age: 40
End: 2025-08-22